# Patient Record
Sex: MALE | Race: WHITE | NOT HISPANIC OR LATINO | Employment: OTHER | URBAN - NONMETROPOLITAN AREA
[De-identification: names, ages, dates, MRNs, and addresses within clinical notes are randomized per-mention and may not be internally consistent; named-entity substitution may affect disease eponyms.]

---

## 2017-01-06 RX ORDER — ALBUTEROL SULFATE 90 UG/1
2 AEROSOL, METERED RESPIRATORY (INHALATION) EVERY 4 HOURS PRN
Qty: 1 INHALER | Refills: 11 | Status: SHIPPED | OUTPATIENT
Start: 2017-01-06 | End: 2017-02-07 | Stop reason: SDUPTHER

## 2017-01-20 ENCOUNTER — OFFICE VISIT (OUTPATIENT)
Dept: FAMILY MEDICINE CLINIC | Facility: CLINIC | Age: 62
End: 2017-01-20

## 2017-01-20 VITALS
SYSTOLIC BLOOD PRESSURE: 122 MMHG | WEIGHT: 132 LBS | BODY MASS INDEX: 19.55 KG/M2 | HEART RATE: 66 BPM | DIASTOLIC BLOOD PRESSURE: 78 MMHG | HEIGHT: 69 IN | OXYGEN SATURATION: 91 % | TEMPERATURE: 97 F

## 2017-01-20 DIAGNOSIS — J44.1 CHRONIC OBSTRUCTIVE PULMONARY DISEASE WITH ACUTE EXACERBATION (HCC): Primary | ICD-10-CM

## 2017-01-20 DIAGNOSIS — L23.1 ALLERGIC CONTACT DERMATITIS DUE TO ADHESIVES: ICD-10-CM

## 2017-01-20 DIAGNOSIS — J44.9 CHRONIC OBSTRUCTIVE PULMONARY DISEASE, UNSPECIFIED COPD TYPE (HCC): ICD-10-CM

## 2017-01-20 DIAGNOSIS — F17.200 TOBACCO DEPENDENCE SYNDROME: ICD-10-CM

## 2017-01-20 PROCEDURE — 99214 OFFICE O/P EST MOD 30 MIN: CPT | Performed by: FAMILY MEDICINE

## 2017-01-20 PROCEDURE — 96372 THER/PROPH/DIAG INJ SC/IM: CPT | Performed by: FAMILY MEDICINE

## 2017-01-20 RX ORDER — GUAIFENESIN AND DEXTROMETHORPHAN HYDROBROMIDE 600; 30 MG/1; MG/1
1 TABLET, EXTENDED RELEASE ORAL 2 TIMES DAILY PRN
Qty: 14 TABLET | Refills: 0 | Status: SHIPPED | OUTPATIENT
Start: 2017-01-20 | End: 2017-06-02 | Stop reason: SDUPTHER

## 2017-01-20 RX ORDER — LEVOFLOXACIN 500 MG/1
500 TABLET, FILM COATED ORAL DAILY
Qty: 10 TABLET | Refills: 0 | Status: SHIPPED | OUTPATIENT
Start: 2017-01-20 | End: 2017-01-30

## 2017-01-20 RX ORDER — POLYETHYLENE GLYCOL 3350 17 G
4 POWDER IN PACKET (EA) ORAL AS NEEDED
Qty: 135 EACH | Refills: 0 | Status: SHIPPED | OUTPATIENT
Start: 2017-01-20 | End: 2017-08-18

## 2017-01-20 RX ORDER — METHYLPREDNISOLONE SODIUM SUCCINATE 125 MG/2ML
125 INJECTION, POWDER, LYOPHILIZED, FOR SOLUTION INTRAMUSCULAR; INTRAVENOUS ONCE
Status: COMPLETED | OUTPATIENT
Start: 2017-01-20 | End: 2017-01-20

## 2017-01-20 RX ADMIN — METHYLPREDNISOLONE SODIUM SUCCINATE 125 MG: 125 INJECTION, POWDER, LYOPHILIZED, FOR SOLUTION INTRAMUSCULAR; INTRAVENOUS at 09:36

## 2017-01-20 NOTE — PROGRESS NOTES
Subjective   Chief Complaint   Patient presents with   • lab results   • Med Dose Change     symbicort, insurance wont pay for it   • Nasal Congestion     hard to breathe     Kermit Corley is a 61 y.o. male.   lab results; Med Dose Change (symbicort, insurance wont pay for it); and Nasal Congestion (hard to breathe)    Shortness of Breath   This is a new problem. The current episode started in the past 7 days. The problem occurs daily. The problem has been gradually worsening. Associated symptoms include chest pain, ear pain, headaches, a rash, rhinorrhea, sputum production and wheezing. Pertinent negatives include no abdominal pain, fever, neck pain or sore throat. The symptoms are aggravated by any activity and lying flat. He has tried nothing for the symptoms. The treatment provided no relief. His past medical history is significant for COPD.   insurance wont cover symbicort or advair  Oxygen at home normally set at 2LPM but recently increased to 3LPM due to SOA    The following portions of the patient's history were reviewed and updated as appropriate: allergies, current medications, past family history, past medical history, past social history, past surgical history and problem list.    Review of Systems   Constitutional: Negative for appetite change, chills, fatigue and fever.   HENT: Positive for ear pain and rhinorrhea. Negative for congestion and sore throat.    Eyes: Negative for pain.   Respiratory: Positive for sputum production, shortness of breath and wheezing. Negative for cough.    Cardiovascular: Positive for chest pain. Negative for palpitations.   Gastrointestinal: Negative for abdominal pain, constipation, diarrhea and nausea.   Genitourinary: Negative for dysuria.   Musculoskeletal: Negative for back pain, joint swelling and neck pain.   Skin: Positive for rash.   Neurological: Positive for headaches. Negative for dizziness.       Objective   Visit Vitals   • /78   • Pulse 66  "  • Temp 97 °F (36.1 °C)   • Ht 69\" (175.3 cm)   • Wt 132 lb (59.9 kg)   • SpO2 91%   • BMI 19.49 kg/m2     Physical Exam   Constitutional: He is oriented to person, place, and time. He appears well-developed and well-nourished.   HENT:   Head: Normocephalic.   Right Ear: External ear and ear canal normal. A middle ear effusion is present.   Left Ear: Tympanic membrane, external ear and ear canal normal.   Nose: Right sinus exhibits maxillary sinus tenderness. Right sinus exhibits no frontal sinus tenderness. Left sinus exhibits maxillary sinus tenderness. Left sinus exhibits no frontal sinus tenderness.   Mouth/Throat: Posterior oropharyngeal erythema present.   Posterior rhinorrhea   Eyes: Pupils are equal, round, and reactive to light.   Neck: Normal range of motion. Neck supple.   Cardiovascular: Normal rate and regular rhythm.    Pulmonary/Chest: He has decreased breath sounds. He has wheezes. He exhibits no tenderness.   Neurological: He is alert and oriented to person, place, and time.   Skin: Skin is dry. Rash noted.   Blistered rash on the left upper extremity with erythema from nicoderm patch   Nursing note and vitals reviewed.      Assessment/Plan   Problems Addressed this Visit        Respiratory    Chronic obstructive pulmonary disease - Primary    Relevant Medications    methylPREDNISolone sodium succinate (SOLU-Medrol) injection 125 mg (Start on 1/20/2017 10:00 AM)    levoFLOXacin (LEVAQUIN) 500 MG tablet    guaifenesin-dextromethorphan (MUCINEX DM)  MG tablet sustained-release 12 hour tablet    tiotropium (SPIRIVA HANDIHALER) 18 MCG per inhalation capsule       Musculoskeletal and Integument    Allergic contact dermatitis due to adhesives       Other    Tobacco dependence syndrome    Relevant Medications    nicotine polacrilex (NICORETTE MINI) 4 MG lozenge        Solumedrol IM today  Start mucinex DM  Start levaquin x 10 days  Use oxygen as directed    Start nicoderemerita frye  Stop nicoderm " patches due to reaction    Start spiriva after exacerbation resolved for maintenance    Reviewed lab work with patient    Recheck in 3 months

## 2017-01-20 NOTE — MR AVS SNAPSHOT
Kermit Corley   1/20/2017 9:15 AM   Office Visit    Dept Phone:  [phone]   Encounter #:  [id number]    Provider:  Maribel Barclay MD   Department:  [facility]                Your Full Care Plan              Today's Medication Changes          These changes are accurate as of: 1/20/17  9:36 AM.  If you have any questions, ask your nurse or doctor.               New Medication(s)Ordered:     guaifenesin-dextromethorphan  MG tablet sustained-release 12 hour tablet   Take 1 tablet by mouth 2 (Two) Times a Day As Needed (cough and congestion).   Started by:  Maribel Barclay MD       levoFLOXacin 500 MG tablet   Commonly known as:  LEVAQUIN   Take 1 tablet by mouth Daily for 10 days.   Started by:  Maribel Barclay MD       nicotine polacrilex 4 MG lozenge   Commonly known as:  NICORETTE MINI   Dissolve 1 lozenge in the mouth As Needed for smoking cessation.   Started by:  Maribel Barclay MD       tiotropium 18 MCG per inhalation capsule   Commonly known as:  SPIRIVA HANDIHALER   Place 1 capsule into inhaler and inhale Daily.   Started by:  Maribel Barclay MD         Stop taking medication(s)listed here:     budesonide-formoterol 160-4.5 MCG/ACT inhaler   Commonly known as:  SYMBICORT   Stopped by:  Maribel Barclay MD           finasteride 5 MG tablet   Commonly known as:  PROSCAR   Stopped by:  Maribel Barclay MD           nicotine 14 MG/24HR patch   Commonly known as:  NICODERM CQ   Stopped by:  Maribel Barclay MD           predniSONE 20 MG tablet   Commonly known as:  DELTASONE   Stopped by:  Maribel Barclay MD                Where to Get Your Medications      These medications were sent to [facility] [address] [place] [address] [address] [facility] [place] [facility] [phone] [facility] [phone]   [address] [facility] [address]     Phone:  [phone]    guaifenesin-dextromethorphan  MG tablet sustained-release 12 hour tablet    levoFLOXacin 500 MG tablet    nicotine polacrilex 4 MG lozenge    tiotropium 18 MCG per inhalation capsule                  Your Updated Medication List          This list is accurate as of: 1/20/17  9:36 AM.  Always use your most recent med list.                * albuterol (2.5 MG/3ML) 0.083% nebulizer solution   Commonly known as:  PROVENTIL       * albuterol 108 (90 BASE) MCG/ACT inhaler   Commonly known as:  PROVENTIL HFA;VENTOLIN HFA   Inhale 2 puffs Every 4 (Four) Hours As Needed for wheezing.       aspirin 81 MG EC tablet       guaifenesin-dextromethorphan  MG tablet sustained-release 12 hour tablet   Take 1 tablet by mouth 2 (Two) Times a Day As Needed (cough and congestion).       IPRATROPIUM-ALBUTEROL IN       levoFLOXacin 500 MG tablet   Commonly known as:  LEVAQUIN   Take 1 tablet by mouth Daily for 10 days.       magnesium oxide 400 (241.3 MG) MG tablet tablet   Commonly known as:  MAGOX       montelukast 10 MG tablet   Commonly known as:  SINGULAIR   Take 1 tablet by mouth Every Night.       Nebulizer device       nicotine polacrilex 4 MG lozenge   Commonly known as:  NICORETTE MINI   Dissolve 1 lozenge in the mouth As Needed for smoking cessation.       tiotropium 18 MCG per inhalation capsule   Commonly known as:  SPIRIVA HANDIHALER   Place 1 capsule into inhaler and inhale Daily.       * Notice:  This list has 2 medication(s) that are the same as other medications prescribed for you. Read the directions carefully, and ask your doctor or other care provider to review them with you.            You Were Diagnosed With        Codes Comments    Chronic obstructive pulmonary disease with acute exacerbation    -  Primary ICD-10-CM: J44.1  ICD-9-CM: 491.21     Tobacco dependence syndrome     ICD-10-CM: F17.200  ICD-9-CM: 305.1     Chronic obstructive pulmonary disease, unspecified COPD type     ICD-10-CM:  "J44.9  ICD-9-CM: 496     Allergic contact dermatitis due to adhesives     ICD-10-CM: L23.1  ICD-9-CM: 692.4       Medications to be Given to You by a Medical Professional     Due       Frequency    1/20/2017 methylPREDNISolone sodium succinate (SOLU-Medrol) injection 125 mg  Once      Instructions     None    Patient Instructions History      Upcoming Appointments     Visit Type Date Time Department    FOLLOW UP 1/20/2017  9:15 AM [facility]      [employer] Signup     Our records indicate that you have declined [employer] signup. If you would like to sign up for [employer], please email [email or web address] or call [phone] to obtain an activation code.             Other Info from Your Visit           Allergies     No Known Allergies      Reason for Visit     lab results     Med Dose Change symbicort, insurance wont pay for it    Nasal Congestion hard to breathe      Vital Signs     Blood Pressure Pulse Temperature Height Weight Oxygen Saturation    122/78 66 97 °F (36.1 °C) 69\" (175.3 cm) 132 lb (59.9 kg) 91%    Body Mass Index Smoking Status                19.49 kg/m2 Current Every Day Smoker          Problems and Diagnoses Noted     Allergic skin inflammation due to adhesives    Chronic airway obstruction    Tobacco dependence      No Longer an Issue     Needs flu shot        "

## 2017-02-07 RX ORDER — ALBUTEROL SULFATE 90 UG/1
2 AEROSOL, METERED RESPIRATORY (INHALATION) EVERY 4 HOURS PRN
Qty: 1 INHALER | Refills: 11 | Status: SHIPPED | OUTPATIENT
Start: 2017-02-07 | End: 2017-06-02 | Stop reason: SDUPTHER

## 2017-02-13 RX ORDER — ALBUTEROL SULFATE 2.5 MG/3ML
2.5 SOLUTION RESPIRATORY (INHALATION) EVERY 4 HOURS PRN
Qty: 1 VIAL | Refills: 5 | Status: SHIPPED | OUTPATIENT
Start: 2017-02-13 | End: 2017-09-21 | Stop reason: SDUPTHER

## 2017-06-02 ENCOUNTER — OFFICE VISIT (OUTPATIENT)
Dept: FAMILY MEDICINE CLINIC | Facility: CLINIC | Age: 62
End: 2017-06-02

## 2017-06-02 VITALS
HEART RATE: 105 BPM | HEIGHT: 69 IN | OXYGEN SATURATION: 96 % | TEMPERATURE: 98.1 F | SYSTOLIC BLOOD PRESSURE: 115 MMHG | BODY MASS INDEX: 19.85 KG/M2 | WEIGHT: 134 LBS | DIASTOLIC BLOOD PRESSURE: 64 MMHG

## 2017-06-02 DIAGNOSIS — F17.200 TOBACCO DEPENDENCE SYNDROME: ICD-10-CM

## 2017-06-02 DIAGNOSIS — R05.8 PRODUCTIVE COUGH: ICD-10-CM

## 2017-06-02 DIAGNOSIS — J44.1 CHRONIC OBSTRUCTIVE PULMONARY DISEASE WITH ACUTE EXACERBATION (HCC): Primary | ICD-10-CM

## 2017-06-02 DIAGNOSIS — J44.1 COPD EXACERBATION (HCC): Primary | ICD-10-CM

## 2017-06-02 PROCEDURE — 99212 OFFICE O/P EST SF 10 MIN: CPT | Performed by: FAMILY MEDICINE

## 2017-06-02 PROCEDURE — 96372 THER/PROPH/DIAG INJ SC/IM: CPT | Performed by: FAMILY MEDICINE

## 2017-06-02 RX ORDER — DOXYCYCLINE HYCLATE 100 MG
100 TABLET ORAL 2 TIMES DAILY
Qty: 20 TABLET | Refills: 0 | Status: SHIPPED | OUTPATIENT
Start: 2017-06-02 | End: 2017-06-12

## 2017-06-02 RX ORDER — GUAIFENESIN AND DEXTROMETHORPHAN HYDROBROMIDE 600; 30 MG/1; MG/1
1 TABLET, EXTENDED RELEASE ORAL 2 TIMES DAILY PRN
Qty: 14 TABLET | Refills: 0 | Status: SHIPPED | OUTPATIENT
Start: 2017-06-02 | End: 2017-08-30

## 2017-06-02 RX ORDER — TRIAMCINOLONE ACETONIDE 40 MG/ML
80 INJECTION, SUSPENSION INTRA-ARTICULAR; INTRAMUSCULAR ONCE
Status: COMPLETED | OUTPATIENT
Start: 2017-06-02 | End: 2017-06-02

## 2017-06-02 RX ORDER — IPRATROPIUM BROMIDE AND ALBUTEROL SULFATE 2.5; .5 MG/3ML; MG/3ML
3 SOLUTION RESPIRATORY (INHALATION)
Status: DISCONTINUED | OUTPATIENT
Start: 2017-06-02 | End: 2017-10-16

## 2017-06-02 RX ORDER — ALBUTEROL SULFATE 2.5 MG/3ML
2.5 SOLUTION RESPIRATORY (INHALATION) EVERY 4 HOURS PRN
Qty: 1 VIAL | Refills: 5 | Status: CANCELLED | OUTPATIENT
Start: 2017-06-02

## 2017-06-02 RX ORDER — ALBUTEROL SULFATE 90 UG/1
2 AEROSOL, METERED RESPIRATORY (INHALATION) EVERY 6 HOURS PRN
Qty: 1 INHALER | Refills: 11 | Status: SHIPPED | OUTPATIENT
Start: 2017-06-02 | End: 2018-08-31 | Stop reason: SDUPTHER

## 2017-06-02 RX ORDER — TRIAMCINOLONE ACETONIDE 40 MG/ML
80 INJECTION, SUSPENSION INTRA-ARTICULAR; INTRAMUSCULAR ONCE
Status: DISCONTINUED | OUTPATIENT
Start: 2017-06-02 | End: 2017-08-25

## 2017-06-02 RX ADMIN — TRIAMCINOLONE ACETONIDE 80 MG: 40 INJECTION, SUSPENSION INTRA-ARTICULAR; INTRAMUSCULAR at 14:28

## 2017-06-02 NOTE — PROGRESS NOTES
Subjective   Chief Complaint   Patient presents with   • COPD     5 month follow up     Kermit Corley is a 62 y.o. male.   COPD (5 month follow up)    Shortness of Breath   This is a new problem. The current episode started in the past 7 days. The problem occurs constantly. Associated symptoms include a sore throat, sputum production, vomiting and wheezing. Pertinent negatives include no abdominal pain, chest pain, ear pain, fever, headaches, hemoptysis, neck pain, rash or rhinorrhea. The symptoms are aggravated by lying flat. Risk factors include smoking. He has tried beta agonist inhalers for the symptoms. The treatment provided no relief.     CXR -  Lungs/Pleura: The lungs appear hyperinflated and there are coarse  interstitial markings likely due to COPD. There are tiny calcific  densities scattered throughout both lungs which are likely due to  old granulomatous disease.     Cardiomediastinal structures: Calcified right hilar lymph nodes  appear unchanged, likely due to old granulomatous disease.  Cardiac silhouette is normal in size.      IMPRESSION:  CONCLUSION:   No acute cardiopulmonary disease     The following portions of the patient's history were reviewed and updated as appropriate: allergies, current medications, past family history, past medical history, past social history, past surgical history and problem list.    Review of Systems   Constitutional: Negative for appetite change, chills, fatigue and fever.   HENT: Positive for sore throat. Negative for congestion, ear pain and rhinorrhea.    Eyes: Negative for pain.   Respiratory: Positive for sputum production, shortness of breath and wheezing. Negative for cough and hemoptysis.    Cardiovascular: Negative for chest pain and palpitations.   Gastrointestinal: Positive for vomiting. Negative for abdominal pain, constipation, diarrhea and nausea.   Genitourinary: Negative for dysuria.   Musculoskeletal: Negative for back pain, joint swelling  "and neck pain.   Skin: Negative for rash.   Neurological: Negative for dizziness and headaches.       Objective   /64  Pulse 105  Temp 98.1 °F (36.7 °C)  Ht 69\" (175.3 cm)  Wt 134 lb (60.8 kg)  SpO2 96%  BMI 19.79 kg/m2  Physical Exam   Constitutional: He is oriented to person, place, and time. He appears well-developed and well-nourished.   HENT:   Head: Normocephalic and atraumatic.   Eyes: Pupils are equal, round, and reactive to light.   Neck: Normal range of motion. Neck supple.   Cardiovascular: Regular rhythm and normal heart sounds.  Tachycardia present.    Pulmonary/Chest: No respiratory distress. He has wheezes. He has no rales.   Abdominal: Soft. Bowel sounds are normal.   Neurological: He is alert and oriented to person, place, and time.   Skin: Skin is warm and dry.   Psychiatric: He has a normal mood and affect.   Nursing note and vitals reviewed.      Assessment/Plan   Problems Addressed this Visit        Respiratory    Chronic obstructive pulmonary disease - Primary    Relevant Medications    albuterol (PROVENTIL HFA;VENTOLIN HFA) 108 (90 BASE) MCG/ACT inhaler    triamcinolone acetonide (KENALOG-40) injection 80 mg (Start on 6/2/2017  3:15 PM)    ipratropium-albuterol (DUO-NEB) nebulizer solution 3 mL (Start on 6/2/2017  4:30 PM)    guaifenesin-dextromethorphan (MUCINEX DM)  MG tablet sustained-release 12 hour tablet       Other    Tobacco dependence syndrome      Other Visit Diagnoses     Productive cough        Relevant Orders    XR Chest 2 View (Completed)        cxr ordered - negative  duoneb provided in the office  Kenalog 80mg IM    Start doxycycline    Start mucinex DM    Refilled nebulizer and albuterol inhaler    Recheck in 1 week         "

## 2017-08-18 ENCOUNTER — OFFICE VISIT (OUTPATIENT)
Dept: FAMILY MEDICINE CLINIC | Facility: CLINIC | Age: 62
End: 2017-08-18

## 2017-08-18 VITALS
BODY MASS INDEX: 20.44 KG/M2 | DIASTOLIC BLOOD PRESSURE: 78 MMHG | HEART RATE: 97 BPM | HEIGHT: 69 IN | WEIGHT: 138 LBS | OXYGEN SATURATION: 95 % | SYSTOLIC BLOOD PRESSURE: 130 MMHG | TEMPERATURE: 98 F

## 2017-08-18 DIAGNOSIS — J18.9 COMMUNITY ACQUIRED PNEUMONIA: ICD-10-CM

## 2017-08-18 DIAGNOSIS — J44.1 COPD WITH ACUTE EXACERBATION (HCC): Primary | ICD-10-CM

## 2017-08-18 DIAGNOSIS — F17.200 TOBACCO DEPENDENCE SYNDROME: ICD-10-CM

## 2017-08-18 DIAGNOSIS — G47.01 INSOMNIA DUE TO MEDICAL CONDITION: ICD-10-CM

## 2017-08-18 PROCEDURE — 99214 OFFICE O/P EST MOD 30 MIN: CPT | Performed by: FAMILY MEDICINE

## 2017-08-18 RX ORDER — LEVOFLOXACIN 500 MG/1
500 TABLET, FILM COATED ORAL DAILY
COMMUNITY
End: 2017-08-25

## 2017-08-18 RX ORDER — NICOTINE 21 MG/24HR
1 PATCH, TRANSDERMAL 24 HOURS TRANSDERMAL EVERY 24 HOURS
Qty: 30 PATCH | Refills: 0 | Status: SHIPPED | OUTPATIENT
Start: 2017-08-18 | End: 2017-08-30

## 2017-08-18 RX ORDER — PREDNISONE 20 MG/1
20 TABLET ORAL DAILY
COMMUNITY
End: 2017-08-30

## 2017-08-18 RX ORDER — TRAZODONE HYDROCHLORIDE 50 MG/1
50 TABLET ORAL NIGHTLY
Qty: 30 TABLET | Refills: 5 | Status: SHIPPED | OUTPATIENT
Start: 2017-08-18 | End: 2017-08-30

## 2017-08-18 NOTE — PROGRESS NOTES
"Subjective   Chief Complaint   Patient presents with   • hospital follow up   • COPD     Kermit Corley is a 62 y.o. male.   hospital follow up and COPD    History of Present Illness     Hospital follow up from [facility] admitted August 12 - 16th  Diagnosed with pneumonia  Sent home with abx and continue with home oxygen  Portable oxygen set at 2LPM by [place]  Currently using albuterol, spiriva  Discharged home with mucinex, prednisone taper, singulair, levaquin  Complaining of shortness of breath  Previously followed with Dr Lee, pulmonology  Requesting a referral to pulmonology  Has cut back on smoking to 1 pack every 3 days  Not using nicorette for smoking cessation    C/o trouble falling into sleep  Recent death of his wife in June has caused him to be emotional, irritable and have a depressed mood    The following portions of the patient's history were reviewed and updated as appropriate: allergies, current medications, past family history, past medical history, past social history, past surgical history and problem list.    Review of Systems   Constitutional: Negative for appetite change, chills, fatigue and fever.   HENT: Negative for congestion, ear pain, rhinorrhea and sore throat.    Eyes: Negative for pain.   Respiratory: Positive for cough, chest tightness, shortness of breath and wheezing.    Cardiovascular: Negative for chest pain and palpitations.   Gastrointestinal: Negative for abdominal pain, constipation, diarrhea and nausea.   Genitourinary: Negative for dysuria.   Musculoskeletal: Negative for back pain, joint swelling and neck pain.   Skin: Negative for rash.   Neurological: Negative for dizziness and headaches.   Psychiatric/Behavioral: Positive for dysphoric mood and sleep disturbance.       Objective   /78  Pulse 97  Temp 98 °F (36.7 °C)  Ht 69\" (175.3 cm)  Wt 138 lb (62.6 kg)  SpO2 95%  BMI 20.38 kg/m2  Physical Exam   Constitutional: He is oriented to person, " place, and time. He appears well-developed and well-nourished.   HENT:   Head: Normocephalic and atraumatic.   Eyes: Pupils are equal, round, and reactive to light.   Neck: Normal range of motion. Neck supple.   Cardiovascular: Normal rate, regular rhythm and normal heart sounds.    Pulmonary/Chest: He is in respiratory distress. He has decreased breath sounds. He has no wheezes. He has no rales.   Abdominal: Soft. Bowel sounds are normal.   Musculoskeletal: Normal range of motion.   Neurological: He is alert and oriented to person, place, and time.   Skin: Skin is warm and dry.   Psychiatric: He has a normal mood and affect.   Nursing note and vitals reviewed.    Assessment/Plan   Problems Addressed this Visit        Respiratory    COPD with acute exacerbation - Primary    Relevant Medications    predniSONE (DELTASONE) 20 MG tablet    Other Relevant Orders    Ambulatory Referral to Pulmonology       Other    Tobacco dependence syndrome    Relevant Medications    nicotine (NICODERM CQ) 14 MG/24HR patch    Insomnia due to medical condition      Other Visit Diagnoses     Community acquired pneumonia            Would prefer close pulmonologist  But would be ok with [place], previously seen Dr Lee    Return for repeat cxr after medication completed    Start trazodone    Discussed smoking cessation  Start nicoderm patches    Recheck in 1-2 weeks

## 2017-08-25 ENCOUNTER — TELEPHONE (OUTPATIENT)
Dept: FAMILY MEDICINE CLINIC | Facility: CLINIC | Age: 62
End: 2017-08-25

## 2017-08-25 ENCOUNTER — OFFICE VISIT (OUTPATIENT)
Dept: FAMILY MEDICINE CLINIC | Facility: CLINIC | Age: 62
End: 2017-08-25

## 2017-08-25 VITALS
HEART RATE: 82 BPM | TEMPERATURE: 96.6 F | OXYGEN SATURATION: 95 % | HEIGHT: 69 IN | DIASTOLIC BLOOD PRESSURE: 72 MMHG | SYSTOLIC BLOOD PRESSURE: 122 MMHG | BODY MASS INDEX: 19.4 KG/M2 | WEIGHT: 131 LBS

## 2017-08-25 DIAGNOSIS — F17.200 TOBACCO DEPENDENCE SYNDROME: ICD-10-CM

## 2017-08-25 DIAGNOSIS — J44.1 COPD WITH ACUTE EXACERBATION (HCC): Primary | ICD-10-CM

## 2017-08-25 PROBLEM — L23.1 ALLERGIC CONTACT DERMATITIS DUE TO ADHESIVES: Status: RESOLVED | Noted: 2017-01-20 | Resolved: 2017-08-25

## 2017-08-25 PROCEDURE — 99213 OFFICE O/P EST LOW 20 MIN: CPT | Performed by: FAMILY MEDICINE

## 2017-08-25 NOTE — PROGRESS NOTES
"Subjective   Chief Complaint   Patient presents with   • COPD     1 week follow up     Kermit Corley is a 62 y.o. male.   COPD (1 week follow up)    History of Present Illness     Presents today for a repeat CXR after diagnosis of pneumonia  Has since been treated with prednisone and levaquin  Has completed abx but has prednisone left  He also has a follow up appointment scheduled with Dr Lee 8/30  He has cut back on his smoking - down to a couple cigarettes per day  Using his inhalers as directed  Complaints of cough, SOA    The following portions of the patient's history were reviewed and updated as appropriate: allergies, current medications, past family history, past medical history, past social history, past surgical history and problem list.    Review of Systems   Constitutional: Negative for appetite change, chills, fatigue and fever.   HENT: Negative for congestion, ear pain, rhinorrhea and sore throat.    Eyes: Negative for pain.   Respiratory: Positive for cough and shortness of breath.    Cardiovascular: Negative for chest pain and palpitations.   Gastrointestinal: Negative for abdominal pain, constipation, diarrhea and nausea.   Genitourinary: Negative for dysuria.   Musculoskeletal: Negative for back pain, joint swelling and neck pain.   Skin: Negative for rash.   Neurological: Negative for dizziness and headaches.       Objective   /72  Pulse 82  Temp 96.6 °F (35.9 °C)  Ht 69\" (175.3 cm)  Wt 131 lb (59.4 kg)  SpO2 95%  BMI 19.35 kg/m2  Physical Exam   Constitutional: He is oriented to person, place, and time. He appears well-developed and well-nourished.   HENT:   Head: Normocephalic and atraumatic.   Eyes: Pupils are equal, round, and reactive to light.   Neck: Normal range of motion. Neck supple.   Cardiovascular: Normal rate, regular rhythm and normal heart sounds.    Pulmonary/Chest: No respiratory distress. He has decreased breath sounds. He has wheezes. He has no rales. "   Abdominal: Soft. Bowel sounds are normal.   Musculoskeletal: Normal range of motion.   Neurological: He is alert and oriented to person, place, and time.   Skin: Skin is warm and dry.   Psychiatric: He has a normal mood and affect.   Nursing note and vitals reviewed.      Assessment/Plan   Problems Addressed this Visit        Respiratory    COPD with acute exacerbation - Primary    Relevant Orders    XR Chest 2 View       Other    Tobacco dependence syndrome        Finish prednisone    Discussed smoking cessation    cxr ordered    Follow up with Dr Lee as scheduled    Return as needed

## 2017-08-25 NOTE — TELEPHONE ENCOUNTER
----- Message from Maribel Barclay MD sent at 8/25/2017  9:01 AM CDT -----  cxr - copd, no pneumonia.

## 2017-08-30 ENCOUNTER — OFFICE VISIT (OUTPATIENT)
Dept: PULMONOLOGY | Facility: CLINIC | Age: 62
End: 2017-08-30

## 2017-08-30 VITALS
SYSTOLIC BLOOD PRESSURE: 120 MMHG | OXYGEN SATURATION: 97 % | DIASTOLIC BLOOD PRESSURE: 74 MMHG | WEIGHT: 131 LBS | HEART RATE: 93 BPM | BODY MASS INDEX: 19.4 KG/M2 | HEIGHT: 69 IN

## 2017-08-30 DIAGNOSIS — J44.1 COPD WITH ACUTE EXACERBATION (HCC): Primary | ICD-10-CM

## 2017-08-30 DIAGNOSIS — J30.2 SEASONAL ALLERGIC RHINITIS, UNSPECIFIED ALLERGIC RHINITIS TRIGGER: ICD-10-CM

## 2017-08-30 DIAGNOSIS — F17.200 TOBACCO DEPENDENCE: ICD-10-CM

## 2017-08-30 PROCEDURE — 96372 THER/PROPH/DIAG INJ SC/IM: CPT | Performed by: INTERNAL MEDICINE

## 2017-08-30 PROCEDURE — 99214 OFFICE O/P EST MOD 30 MIN: CPT | Performed by: INTERNAL MEDICINE

## 2017-08-30 RX ORDER — DOXYCYCLINE 100 MG/1
CAPSULE ORAL
Qty: 20 CAPSULE | Refills: 0 | Status: SHIPPED | OUTPATIENT
Start: 2017-08-30 | End: 2017-10-16

## 2017-08-30 RX ORDER — PREDNISONE 10 MG/1
TABLET ORAL
Qty: 21 TABLET | Refills: 0 | Status: SHIPPED | OUTPATIENT
Start: 2017-08-30 | End: 2017-10-16

## 2017-08-30 RX ORDER — BETAMETHASONE SODIUM PHOSPHATE AND BETAMETHASONE ACETATE 3; 3 MG/ML; MG/ML
6 INJECTION, SUSPENSION INTRA-ARTICULAR; INTRALESIONAL; INTRAMUSCULAR; SOFT TISSUE ONCE
Status: COMPLETED | OUTPATIENT
Start: 2017-08-30 | End: 2017-08-30

## 2017-08-30 RX ADMIN — BETAMETHASONE SODIUM PHOSPHATE AND BETAMETHASONE ACETATE 6 MG: 3; 3 INJECTION, SUSPENSION INTRA-ARTICULAR; INTRALESIONAL; INTRAMUSCULAR; SOFT TISSUE at 09:11

## 2017-08-30 NOTE — PROGRESS NOTES
"This gentleman has long-standing advanced COPD, persistent tobacco use, frequent episodes of pneumonia, recent hospitalization for pneumonia.  He complains of increasing shortness of breath, wheeze, purulent sputum, dyspnea on exertion.  He also complains of sneezing and head congestion    ROS    Constitutional-no night sweats weight loss headaches  GI no abdominal pain nausea or diarrhea  Neuro no seizure or neurologic deficits  Musculoskeletal no deformity or joint pain   no dysuria or hematuria  Skin no rash or other lesions  All other systems reviewed and were negative except for the above.      Physical Exam  /74  Pulse 93  Ht 69\" (175.3 cm)  Wt 131 lb (59.4 kg)  SpO2 97%  BMI 19.35 kg/m2  Vital signs as above  Pupils equally round and reactive to light and accommodation, neck no JVD or adenopathy.  Cardiovascular regular rhythm and rate no murmur or gallop.  Abdomen soft no organomegaly tenderness.  Extremities no clubbing cyanosis or edema.  No cervical adenopathy.  No skin rash.  Neurologic good strength bilaterally without deficits  Dyspneic white male with respiratory rate in 20s with wheezes and rhonchi and nasal congestion    Impression COPD exacerbation, acute bronchitis, rhinitis exacerbation.    Plan Celestone 1 cc IM, prednisone tapering dose, doxycycline, a trial of breo, continue routine meds, return next week          This document has been electronically signed by Aba Lee MD on August 30, 2017 9:10 AM      "

## 2017-09-06 ENCOUNTER — OFFICE VISIT (OUTPATIENT)
Dept: PULMONOLOGY | Facility: CLINIC | Age: 62
End: 2017-09-06

## 2017-09-06 VITALS
HEIGHT: 69 IN | BODY MASS INDEX: 19.4 KG/M2 | DIASTOLIC BLOOD PRESSURE: 69 MMHG | HEART RATE: 76 BPM | WEIGHT: 131 LBS | SYSTOLIC BLOOD PRESSURE: 120 MMHG | OXYGEN SATURATION: 95 %

## 2017-09-06 DIAGNOSIS — J44.1 COPD WITH ACUTE EXACERBATION (HCC): ICD-10-CM

## 2017-09-06 DIAGNOSIS — F17.200 TOBACCO DEPENDENCE: Primary | ICD-10-CM

## 2017-09-06 PROCEDURE — 96372 THER/PROPH/DIAG INJ SC/IM: CPT | Performed by: INTERNAL MEDICINE

## 2017-09-06 PROCEDURE — 99214 OFFICE O/P EST MOD 30 MIN: CPT | Performed by: INTERNAL MEDICINE

## 2017-09-06 RX ORDER — DOXYCYCLINE 100 MG/1
CAPSULE ORAL
Qty: 20 CAPSULE | Refills: 0 | Status: SHIPPED | OUTPATIENT
Start: 2017-09-06 | End: 2017-10-16

## 2017-09-06 RX ORDER — METHYLPREDNISOLONE ACETATE 40 MG/ML
80 INJECTION, SUSPENSION INTRA-ARTICULAR; INTRALESIONAL; INTRAMUSCULAR; SOFT TISSUE ONCE
Status: COMPLETED | OUTPATIENT
Start: 2017-09-06 | End: 2017-09-06

## 2017-09-06 RX ORDER — PREDNISONE 10 MG/1
TABLET ORAL
Qty: 21 TABLET | Refills: 0 | Status: SHIPPED | OUTPATIENT
Start: 2017-09-06 | End: 2017-10-16

## 2017-09-06 RX ADMIN — METHYLPREDNISOLONE ACETATE 80 MG: 40 INJECTION, SUSPENSION INTRA-ARTICULAR; INTRALESIONAL; INTRAMUSCULAR; SOFT TISSUE at 10:10

## 2017-09-06 NOTE — PROGRESS NOTES
"This gentleman has COPD and persistent tobacco use.  He believes his bronchodilators are helping but he continues to have cough wheeze purulent sputum and dyspnea on exertion    ROS    Constitutional-no night sweats weight loss headaches  GI no abdominal pain nausea or diarrhea  Neuro no seizure or neurologic deficits  Musculoskeletal no deformity or joint pain   no dysuria or hematuria  Skin no rash or other lesions  All other systems reviewed and were negative except for the above.      Physical Exam  /69  Pulse 76  Ht 69\" (175.3 cm)  Wt 131 lb (59.4 kg)  SpO2 95%  BMI 19.35 kg/m2  Vital signs as above  Pupils equally round and reactive to light and accommodation, neck no JVD or adenopathy.  Cardiovascular regular rhythm and rate no murmur or gallop.  Abdomen soft no organomegaly tenderness.  Extremities no clubbing cyanosis or edema.  No cervical adenopathy.  No skin rash.  Neurologic good strength bilaterally without deficits  Lungs reveal diminished breath sounds, patient is in mild to moderate respiratory distress with wheezes    Impression COPD exacerbation, bronchitis    Plan Depo-Medrol, prednisone, breo, albuterol, doxycycline, discourage smoking, return in 1 month          This document has been electronically signed by Aba Lee MD on September 6, 2017 10:01 AM      "

## 2017-09-22 RX ORDER — ALBUTEROL SULFATE 2.5 MG/3ML
SOLUTION RESPIRATORY (INHALATION)
Qty: 180 VIAL | Refills: 1 | Status: SHIPPED | OUTPATIENT
Start: 2017-09-22 | End: 2018-03-21 | Stop reason: SDUPTHER

## 2017-10-16 ENCOUNTER — TELEPHONE (OUTPATIENT)
Dept: FAMILY MEDICINE CLINIC | Facility: CLINIC | Age: 62
End: 2017-10-16

## 2017-10-16 ENCOUNTER — LAB (OUTPATIENT)
Dept: LAB | Facility: OTHER | Age: 62
End: 2017-10-16

## 2017-10-16 ENCOUNTER — OFFICE VISIT (OUTPATIENT)
Dept: FAMILY MEDICINE CLINIC | Facility: CLINIC | Age: 62
End: 2017-10-16

## 2017-10-16 VITALS
DIASTOLIC BLOOD PRESSURE: 70 MMHG | OXYGEN SATURATION: 93 % | WEIGHT: 128 LBS | HEIGHT: 69 IN | HEART RATE: 82 BPM | TEMPERATURE: 97.6 F | BODY MASS INDEX: 18.96 KG/M2 | SYSTOLIC BLOOD PRESSURE: 122 MMHG

## 2017-10-16 DIAGNOSIS — J44.1 COPD WITH ACUTE EXACERBATION (HCC): Primary | ICD-10-CM

## 2017-10-16 DIAGNOSIS — F17.200 TOBACCO DEPENDENCE: ICD-10-CM

## 2017-10-16 LAB
ANION GAP SERPL CALCULATED.3IONS-SCNC: 14 MMOL/L (ref 5–15)
BASOPHILS # BLD AUTO: 0.01 10*3/MM3 (ref 0–0.2)
BASOPHILS NFR BLD AUTO: 0.1 % (ref 0–2)
BUN BLD-MCNC: <6 MG/DL (ref 8–25)
BUN/CREAT SERPL: ABNORMAL (ref 7–25)
CALCIUM SPEC-SCNC: 9.6 MG/DL (ref 8.4–10.8)
CHLORIDE SERPL-SCNC: 98 MMOL/L (ref 100–112)
CO2 SERPL-SCNC: 29 MMOL/L (ref 20–32)
CREAT BLD-MCNC: 0.7 MG/DL (ref 0.4–1.3)
DEPRECATED RDW RBC AUTO: 40.7 FL (ref 35.1–43.9)
EOSINOPHIL # BLD AUTO: 0.16 10*3/MM3 (ref 0–0.7)
EOSINOPHIL NFR BLD AUTO: 2.2 % (ref 0–7)
ERYTHROCYTE [DISTWIDTH] IN BLOOD BY AUTOMATED COUNT: 12.4 % (ref 11.5–14.5)
GFR SERPL CREATININE-BSD FRML MDRD: 114 ML/MIN/1.73 (ref 49–113)
GLUCOSE BLD-MCNC: 85 MG/DL (ref 70–100)
HCT VFR BLD AUTO: 43.8 % (ref 39–49)
HGB BLD-MCNC: 14.4 G/DL (ref 13.7–17.3)
LYMPHOCYTES # BLD AUTO: 1.22 10*3/MM3 (ref 0.6–4.2)
LYMPHOCYTES NFR BLD AUTO: 16.5 % (ref 10–50)
MCH RBC QN AUTO: 30 PG (ref 26.5–34)
MCHC RBC AUTO-ENTMCNC: 32.9 G/DL (ref 31.5–36.3)
MCV RBC AUTO: 91.3 FL (ref 80–98)
MONOCYTES # BLD AUTO: 0.86 10*3/MM3 (ref 0–0.9)
MONOCYTES NFR BLD AUTO: 11.6 % (ref 0–12)
NEUTROPHILS # BLD AUTO: 5.16 10*3/MM3 (ref 2–8.6)
NEUTROPHILS NFR BLD AUTO: 69.6 % (ref 37–80)
PLATELET # BLD AUTO: 251 10*3/MM3 (ref 150–450)
PMV BLD AUTO: 10.5 FL (ref 8–12)
POTASSIUM BLD-SCNC: 3.9 MMOL/L (ref 3.4–5.4)
RBC # BLD AUTO: 4.8 10*6/MM3 (ref 4.37–5.74)
SODIUM BLD-SCNC: 141 MMOL/L (ref 134–146)
WBC NRBC COR # BLD: 7.41 10*3/MM3 (ref 3.2–9.8)

## 2017-10-16 PROCEDURE — 96372 THER/PROPH/DIAG INJ SC/IM: CPT | Performed by: FAMILY MEDICINE

## 2017-10-16 PROCEDURE — 85025 COMPLETE CBC W/AUTO DIFF WBC: CPT | Performed by: FAMILY MEDICINE

## 2017-10-16 PROCEDURE — 99213 OFFICE O/P EST LOW 20 MIN: CPT | Performed by: FAMILY MEDICINE

## 2017-10-16 PROCEDURE — 80048 BASIC METABOLIC PNL TOTAL CA: CPT | Performed by: FAMILY MEDICINE

## 2017-10-16 RX ORDER — BENZONATATE 100 MG/1
100 CAPSULE ORAL 3 TIMES DAILY PRN
Qty: 30 CAPSULE | Refills: 0 | Status: SHIPPED | OUTPATIENT
Start: 2017-10-16 | End: 2017-12-04 | Stop reason: SDUPTHER

## 2017-10-16 RX ORDER — TRIAMCINOLONE ACETONIDE 40 MG/ML
80 INJECTION, SUSPENSION INTRA-ARTICULAR; INTRAMUSCULAR ONCE
Status: COMPLETED | OUTPATIENT
Start: 2017-10-16 | End: 2017-10-16

## 2017-10-16 RX ORDER — IPRATROPIUM BROMIDE AND ALBUTEROL SULFATE 2.5; .5 MG/3ML; MG/3ML
3 SOLUTION RESPIRATORY (INHALATION) ONCE
Status: COMPLETED | OUTPATIENT
Start: 2017-10-16 | End: 2017-10-16

## 2017-10-16 RX ORDER — METHYLPREDNISOLONE 4 MG/1
TABLET ORAL
Qty: 21 EACH | Refills: 0 | Status: SHIPPED | OUTPATIENT
Start: 2017-10-16 | End: 2017-10-22

## 2017-10-16 RX ADMIN — TRIAMCINOLONE ACETONIDE 80 MG: 40 INJECTION, SUSPENSION INTRA-ARTICULAR; INTRAMUSCULAR at 10:37

## 2017-10-16 RX ADMIN — IPRATROPIUM BROMIDE AND ALBUTEROL SULFATE 3 ML: 2.5; .5 SOLUTION RESPIRATORY (INHALATION) at 10:48

## 2017-10-16 NOTE — PROGRESS NOTES
"Subjective   Chief Complaint   Patient presents with   • Nasal Congestion     fever, 1 week   • Cough     productive     Kermit Corley is a 62 y.o. male.   Nasal Congestion (fever, 1 week) and Cough (productive)    Cough   This is a new problem. The current episode started in the past 7 days. The problem has been gradually worsening. The cough is productive of sputum. Associated symptoms include chest pain, chills, ear congestion, ear pain, headaches, nasal congestion, postnasal drip, rhinorrhea, a sore throat and shortness of breath. The symptoms are aggravated by lying down. He has tried OTC cough suppressant for the symptoms. The treatment provided no relief. His past medical history is significant for COPD and pneumonia (last month, recently hospitalized).   Short of breath with conversation  Describes feeling like he can only walk 5-10 feet before feeling short of breath  Left side/back/chest pain  Recently hospitalized with pneumonia, one month ago.    The following portions of the patient's history were reviewed and updated as appropriate: allergies, current medications, past family history, past medical history, past social history, past surgical history and problem list.    Review of Systems   Constitutional: Positive for chills.   HENT: Positive for congestion, ear pain, postnasal drip, rhinorrhea, sinus pain, sinus pressure and sore throat.    Respiratory: Positive for cough and shortness of breath.    Cardiovascular: Positive for chest pain.   Gastrointestinal: Positive for vomiting.   Neurological: Positive for headaches.       Objective   /70  Pulse 82  Temp 97.6 °F (36.4 °C)  Ht 69\" (175.3 cm)  Wt 128 lb (58.1 kg)  SpO2 93%  BMI 18.9 kg/m2  Physical Exam   Constitutional: He is oriented to person, place, and time. He appears well-developed and well-nourished.   HENT:   Head: Normocephalic and atraumatic.   Eyes: Pupils are equal, round, and reactive to light.   Neck: Normal range " of motion. Neck supple.   Cardiovascular: Normal rate, regular rhythm and normal heart sounds.    Pulmonary/Chest: He is in respiratory distress. He has decreased breath sounds. He has no wheezes. He has no rales.   Abdominal: Soft. Bowel sounds are normal.   Musculoskeletal: Normal range of motion.   Neurological: He is alert and oriented to person, place, and time.   Skin: Skin is warm and dry.   Psychiatric: He has a normal mood and affect.   Nursing note and vitals reviewed.      Assessment/Plan   Problems Addressed this Visit        Respiratory    COPD with acute exacerbation - Primary    Relevant Medications    ipratropium-albuterol (DUO-NEB) nebulizer solution 3 mL (Completed)    triamcinolone acetonide (KENALOG-40) injection 80 mg (Completed)    benzonatate (TESSALON PERLES) 100 MG capsule    MethylPREDNISolone (MEDROL, ELISE,) 4 MG tablet    Other Relevant Orders    CBC & Differential (Completed)    XR Chest 2 View (Completed)    CBC Auto Differential (Completed)       Other    Tobacco dependence        Duoneb in office    Kenalog 80mg IM    cxr ordered and labs ordered - reviewed    Continue with inhalers, home oxygen  Refrain from tobacco use    Start tessalon for cough    Start medrol dose pack    Recheck as needed  Instructed patient on worsening symptoms - needed to go to the ER

## 2017-10-16 NOTE — TELEPHONE ENCOUNTER
Called pt and told him of results and that scripts were sent to pharmacy. Pt stated his understanding. 10/16/17

## 2017-10-16 NOTE — TELEPHONE ENCOUNTER
----- Message from Maribel Barclay MD sent at 10/16/2017 12:02 PM CDT -----  cxr - negative for pneumonia - labs are ok.  Need to start steroid tablets and cough suppressant.

## 2017-12-04 ENCOUNTER — OFFICE VISIT (OUTPATIENT)
Dept: FAMILY MEDICINE CLINIC | Facility: CLINIC | Age: 62
End: 2017-12-04

## 2017-12-04 VITALS
DIASTOLIC BLOOD PRESSURE: 70 MMHG | HEIGHT: 69 IN | TEMPERATURE: 97 F | OXYGEN SATURATION: 95 % | BODY MASS INDEX: 18.07 KG/M2 | HEART RATE: 82 BPM | SYSTOLIC BLOOD PRESSURE: 126 MMHG | WEIGHT: 122 LBS

## 2017-12-04 DIAGNOSIS — Z09 HOSPITAL DISCHARGE FOLLOW-UP: Primary | ICD-10-CM

## 2017-12-04 DIAGNOSIS — J44.1 COPD WITH ACUTE EXACERBATION (HCC): ICD-10-CM

## 2017-12-04 DIAGNOSIS — F17.200 TOBACCO DEPENDENCE SYNDROME: ICD-10-CM

## 2017-12-04 PROCEDURE — 99214 OFFICE O/P EST MOD 30 MIN: CPT | Performed by: FAMILY MEDICINE

## 2017-12-04 RX ORDER — PREDNISONE 20 MG/1
TABLET ORAL
COMMUNITY
Start: 2017-11-22 | End: 2017-12-04

## 2017-12-04 RX ORDER — BENZONATATE 100 MG/1
100 CAPSULE ORAL 3 TIMES DAILY PRN
Qty: 30 CAPSULE | Refills: 0 | Status: SHIPPED | OUTPATIENT
Start: 2017-12-04 | End: 2018-01-30

## 2017-12-04 RX ORDER — AZITHROMYCIN 250 MG/1
TABLET, FILM COATED ORAL
COMMUNITY
Start: 2017-11-22 | End: 2017-12-04

## 2017-12-04 RX ORDER — FLUCONAZOLE 100 MG/1
TABLET ORAL
COMMUNITY
Start: 2017-11-22 | End: 2017-12-04

## 2017-12-04 RX ORDER — CEFDINIR 300 MG/1
CAPSULE ORAL
COMMUNITY
Start: 2017-11-22 | End: 2017-12-04

## 2017-12-04 RX ORDER — IPRATROPIUM BROMIDE AND ALBUTEROL SULFATE 2.5; .5 MG/3ML; MG/3ML
3 SOLUTION RESPIRATORY (INHALATION) ONCE
Status: DISCONTINUED | OUTPATIENT
Start: 2017-12-04 | End: 2017-12-04

## 2017-12-04 NOTE — PROGRESS NOTES
"Subjective   Chief Complaint   Patient presents with   • hospital follow up     [facility]   • Med Refill     Kermit Corley is a 62 y.o. male.   hospital follow up ([facility]) and Med Refill    History of Present Illness     Hospital follow up from [facility], admitted for shortness of breath  Diagnosed with copd exacerbation  Admitted 11/19 and discharged 11/22  Sent home with omnicef, azithromycin, prednisone  Has finished course of medications above  Today continues to have difficulty breathing, chest tightness, cough  Having issues with insurance for home oxygen  Current oxygen saturation 95% at rest    Tobacco dependence syndrome - currently smoking about 10 cigarettes per day  Currently using nicoderm patches     The following portions of the patient's history were reviewed and updated as appropriate: allergies, current medications, past family history, past medical history, past social history, past surgical history and problem list.    Review of Systems   Constitutional: Negative for appetite change, chills, fatigue and fever.   HENT: Negative for congestion, ear pain, rhinorrhea and sore throat.    Eyes: Negative for pain.   Respiratory: Positive for cough, chest tightness, shortness of breath and wheezing.    Cardiovascular: Negative for chest pain and palpitations.   Gastrointestinal: Negative for abdominal pain, constipation, diarrhea and nausea.   Genitourinary: Negative for dysuria.   Musculoskeletal: Negative for back pain, joint swelling and neck pain.   Skin: Negative for rash.   Neurological: Negative for dizziness and headaches.       Objective   /70  Pulse 82  Temp 97 °F (36.1 °C)  Ht 69\" (175.3 cm)  Wt 122 lb (55.3 kg)  SpO2 95%  BMI 18.02 kg/m2  Physical Exam   Constitutional: He is oriented to person, place, and time. He appears well-developed and well-nourished.   HENT:   Head: Normocephalic and atraumatic.   Eyes: Pupils are equal, round, and reactive to light.   Neck: " Normal range of motion. Neck supple.   Cardiovascular: Normal rate, regular rhythm and normal heart sounds.    Pulmonary/Chest: No respiratory distress. He has decreased breath sounds. He has wheezes. He has no rales.   Abdominal: Soft. Bowel sounds are normal.   Musculoskeletal: Normal range of motion.   Neurological: He is alert and oriented to person, place, and time.   Skin: Skin is warm and dry.   Psychiatric: He has a normal mood and affect.   Nursing note and vitals reviewed.      Assessment/Plan   Problems Addressed this Visit        Respiratory    COPD with acute exacerbation    Relevant Medications    benzonatate (TESSALON PERLES) 100 MG capsule    Other Relevant Orders    XR Chest 2 View       Other    Tobacco dependence syndrome      Other Visit Diagnoses     Hospital discharge follow-up    -  Primary        Discussed and recommended smoking cessation    cxr ordered    Discussed oxygen qualifications - would like to bring in someone to accompany him this day    matteo    Recheck in 1-2 weeks

## 2017-12-05 ENCOUNTER — OFFICE VISIT (OUTPATIENT)
Dept: FAMILY MEDICINE CLINIC | Facility: CLINIC | Age: 62
End: 2017-12-05

## 2017-12-05 VITALS
TEMPERATURE: 98 F | DIASTOLIC BLOOD PRESSURE: 76 MMHG | HEIGHT: 69 IN | WEIGHT: 122 LBS | SYSTOLIC BLOOD PRESSURE: 126 MMHG | HEART RATE: 105 BPM | BODY MASS INDEX: 18.07 KG/M2 | OXYGEN SATURATION: 82 %

## 2017-12-05 DIAGNOSIS — Z99.81 HISTORY OF HOME OXYGEN THERAPY: ICD-10-CM

## 2017-12-05 DIAGNOSIS — R79.81 LOW OXYGEN SATURATION: ICD-10-CM

## 2017-12-05 DIAGNOSIS — F17.200 TOBACCO DEPENDENCE SYNDROME: ICD-10-CM

## 2017-12-05 DIAGNOSIS — J44.9 COPD, SEVERE (HCC): Primary | ICD-10-CM

## 2017-12-05 PROCEDURE — 99214 OFFICE O/P EST MOD 30 MIN: CPT | Performed by: FAMILY MEDICINE

## 2017-12-05 RX ORDER — IPRATROPIUM BROMIDE AND ALBUTEROL SULFATE 2.5; .5 MG/3ML; MG/3ML
3 SOLUTION RESPIRATORY (INHALATION) ONCE
Status: DISCONTINUED | OUTPATIENT
Start: 2017-12-05 | End: 2018-01-30

## 2017-12-05 NOTE — PROGRESS NOTES
"Subjective   Chief Complaint   Patient presents with   • qualifying for oxygen test     Kermit Corley is a 62 y.o. male.   qualifying for oxygen test    History of Present Illness     Presents today to qualify for oxygen  Diagnosed with severe copd  Currently using spiriva, albuterol nebulizer, singulair  Previously used home oxygen but had issues with insurance coverage  At rest oxygen 95%  His oxygen level dropped to 83% with activity - produced respiratory distress    The following portions of the patient's history were reviewed and updated as appropriate: allergies, current medications, past family history, past medical history, past social history, past surgical history and problem list.    Review of Systems   Constitutional: Negative for appetite change, chills, fatigue and fever.   HENT: Negative for congestion, ear pain, rhinorrhea and sore throat.    Eyes: Negative for pain.   Respiratory: Positive for cough, chest tightness and shortness of breath.    Cardiovascular: Negative for chest pain and palpitations.   Gastrointestinal: Negative for abdominal pain, constipation, diarrhea and nausea.   Genitourinary: Negative for dysuria.   Musculoskeletal: Negative for back pain, joint swelling and neck pain.   Skin: Negative for rash.   Neurological: Negative for dizziness and headaches.       Objective   /76  Pulse 105  Temp 98 °F (36.7 °C)  Ht 175.3 cm (69.02\")  Wt 55.3 kg (122 lb)  SpO2 (!) 82% Comment: after walking  BMI 18.01 kg/m2  Physical Exam   Constitutional: He is oriented to person, place, and time. He appears well-developed and well-nourished.   HENT:   Head: Normocephalic and atraumatic.   Eyes: Pupils are equal, round, and reactive to light.   Neck: Normal range of motion. Neck supple.   Cardiovascular: Normal rate, regular rhythm and normal heart sounds.    Pulmonary/Chest: No respiratory distress. He has decreased breath sounds. He has wheezes. He has no rales.   Abdominal: " Soft. Bowel sounds are normal.   Musculoskeletal: Normal range of motion.   Neurological: He is alert and oriented to person, place, and time.   Skin: Skin is warm and dry.   Psychiatric: He has a normal mood and affect.   Nursing note and vitals reviewed.      Assessment/Plan   Problems Addressed this Visit        Respiratory    COPD, severe - Primary    Relevant Medications    ipratropium-albuterol (DUO-NEB) nebulizer solution 3 mL (Start on 12/5/2017  2:15 PM)       Other    Tobacco dependence syndrome      Other Visit Diagnoses     History of home oxygen therapy        Low oxygen saturation            [facility]    cxr reviewed - severe copd    Appointment with pulmonology scheduled    duoneb today    Discussed and stressed the importance of smoking cessation    Recheck in 3 months or sooner as needed

## 2017-12-13 ENCOUNTER — OFFICE VISIT (OUTPATIENT)
Dept: PULMONOLOGY | Facility: CLINIC | Age: 62
End: 2017-12-13

## 2017-12-13 VITALS
WEIGHT: 124.5 LBS | SYSTOLIC BLOOD PRESSURE: 120 MMHG | OXYGEN SATURATION: 98 % | HEART RATE: 85 BPM | BODY MASS INDEX: 18.44 KG/M2 | HEIGHT: 69 IN | DIASTOLIC BLOOD PRESSURE: 76 MMHG

## 2017-12-13 DIAGNOSIS — J96.10 CHRONIC RESPIRATORY FAILURE, UNSPECIFIED WHETHER WITH HYPOXIA OR HYPERCAPNIA (HCC): ICD-10-CM

## 2017-12-13 DIAGNOSIS — J44.1 COPD EXACERBATION (HCC): Primary | ICD-10-CM

## 2017-12-13 PROBLEM — K46.9 HERNIA OF ABDOMINAL CAVITY: Status: ACTIVE | Noted: 2017-12-13

## 2017-12-13 PROBLEM — R60.9 EDEMA: Status: ACTIVE | Noted: 2017-12-13

## 2017-12-13 PROBLEM — J96.12 CHRONIC RESPIRATORY FAILURE WITH HYPERCAPNIA (HCC): Status: ACTIVE | Noted: 2017-12-13

## 2017-12-13 PROBLEM — Z46.82 ENCOUNTER FOR CHEST TUBE PLACEMENT: Status: ACTIVE | Noted: 2017-12-13

## 2017-12-13 PROBLEM — J93.9 PNEUMOTHORAX, RIGHT: Status: ACTIVE | Noted: 2017-12-13

## 2017-12-13 PROBLEM — R04.2 HEMOPTYSIS: Status: ACTIVE | Noted: 2017-12-13

## 2017-12-13 PROBLEM — R07.9 CHEST PAIN: Status: ACTIVE | Noted: 2017-12-13

## 2017-12-13 PROBLEM — A41.9 SEPSIS (HCC): Status: ACTIVE | Noted: 2017-12-13

## 2017-12-13 PROBLEM — J18.9 PNEUMONIA: Status: ACTIVE | Noted: 2017-12-13

## 2017-12-13 PROBLEM — G47.00 INSOMNIA: Status: ACTIVE | Noted: 2017-08-18

## 2017-12-13 PROBLEM — J44.9 CHRONIC BRONCHITIS WITH COPD (CHRONIC OBSTRUCTIVE PULMONARY DISEASE) (HCC): Status: ACTIVE | Noted: 2017-12-13

## 2017-12-13 PROBLEM — J45.909 ASTHMA: Status: ACTIVE | Noted: 2017-12-13

## 2017-12-13 PROBLEM — E83.42 HYPOMAGNESEMIA: Status: ACTIVE | Noted: 2017-12-13

## 2017-12-13 PROBLEM — J96.90 RESPIRATORY FAILURE (HCC): Status: ACTIVE | Noted: 2017-12-13

## 2017-12-13 PROBLEM — Z72.0 TOBACCO ABUSE: Status: ACTIVE | Noted: 2017-12-13

## 2017-12-13 PROCEDURE — 99214 OFFICE O/P EST MOD 30 MIN: CPT | Performed by: INTERNAL MEDICINE

## 2017-12-13 PROCEDURE — 96372 THER/PROPH/DIAG INJ SC/IM: CPT | Performed by: INTERNAL MEDICINE

## 2017-12-13 RX ORDER — PREDNISONE 10 MG/1
TABLET ORAL
Qty: 21 TABLET | Refills: 0 | Status: SHIPPED | OUTPATIENT
Start: 2017-12-13 | End: 2018-01-23 | Stop reason: HOSPADM

## 2017-12-13 RX ORDER — DOXYCYCLINE 100 MG/1
CAPSULE ORAL
Qty: 20 CAPSULE | Refills: 0 | Status: SHIPPED | OUTPATIENT
Start: 2017-12-13 | End: 2018-01-23 | Stop reason: HOSPADM

## 2017-12-13 RX ORDER — METHYLPREDNISOLONE ACETATE 40 MG/ML
80 INJECTION, SUSPENSION INTRA-ARTICULAR; INTRALESIONAL; INTRAMUSCULAR; SOFT TISSUE ONCE
Status: COMPLETED | OUTPATIENT
Start: 2017-12-13 | End: 2017-12-13

## 2017-12-13 RX ADMIN — METHYLPREDNISOLONE ACETATE 80 MG: 40 INJECTION, SUSPENSION INTRA-ARTICULAR; INTRALESIONAL; INTRAMUSCULAR; SOFT TISSUE at 15:17

## 2017-12-13 NOTE — PROGRESS NOTES
"This gentleman has severe COPD with recent hospitalization.  He continues to have cough wheeze shortness of breath purulent sputum and dyspnea on minimal exertion    ROS    Constitutional-no night sweats weight loss headaches  GI no abdominal pain nausea or diarrhea  Neuro no seizure or neurologic deficits  Musculoskeletal no deformity or joint pain   no dysuria or hematuria  Skin no rash or other lesions  All other systems reviewed and were negative except for the above.      Physical Exam  /76 (BP Location: Right arm, Patient Position: Sitting, Cuff Size: Adult)  Pulse 85  Ht 175.3 cm (69.02\")  Wt 56.5 kg (124 lb 8 oz)  SpO2 98%  BMI 18.37 kg/m2  Vital signs as above  Pupils equally round and reactive to light and accommodation, neck no JVD or adenopathy.  Cardiovascular regular rhythm and rate no murmur or gallop.  Abdomen soft no organomegaly tenderness.  Extremities no clubbing cyanosis or edema.  No cervical adenopathy.  No skin rash.  Neurologic good strength bilaterally without deficits  Dyspneic white male with diminished breath sounds wheezes and rhonchi bilaterally    Impression COPD exacerbation with bronchitis    Plan Depo-Medrol, the anoro, prednisone, doxycycline, return in 2 weeks          This document has been electronically signed by Aba Lee MD on December 13, 2017 3:12 PM      "

## 2017-12-22 DIAGNOSIS — J44.9 CHRONIC OBSTRUCTIVE PULMONARY DISEASE, UNSPECIFIED COPD TYPE (HCC): ICD-10-CM

## 2017-12-27 RX ORDER — MONTELUKAST SODIUM 10 MG/1
TABLET ORAL
Qty: 30 TABLET | Refills: 5 | Status: SHIPPED | OUTPATIENT
Start: 2017-12-27 | End: 2018-08-09

## 2017-12-29 ENCOUNTER — OFFICE VISIT (OUTPATIENT)
Dept: PULMONOLOGY | Facility: CLINIC | Age: 62
End: 2017-12-29

## 2017-12-29 VITALS
SYSTOLIC BLOOD PRESSURE: 127 MMHG | WEIGHT: 128.8 LBS | DIASTOLIC BLOOD PRESSURE: 81 MMHG | HEIGHT: 69 IN | HEART RATE: 106 BPM | OXYGEN SATURATION: 92 % | BODY MASS INDEX: 19.08 KG/M2

## 2017-12-29 DIAGNOSIS — J96.10 CHRONIC RESPIRATORY FAILURE, UNSPECIFIED WHETHER WITH HYPOXIA OR HYPERCAPNIA (HCC): ICD-10-CM

## 2017-12-29 DIAGNOSIS — J44.1 COPD EXACERBATION (HCC): Primary | ICD-10-CM

## 2017-12-29 PROCEDURE — 99213 OFFICE O/P EST LOW 20 MIN: CPT | Performed by: INTERNAL MEDICINE

## 2017-12-29 PROCEDURE — 96372 THER/PROPH/DIAG INJ SC/IM: CPT | Performed by: INTERNAL MEDICINE

## 2017-12-29 RX ORDER — TRAZODONE HYDROCHLORIDE 50 MG/1
50 TABLET ORAL NIGHTLY
COMMUNITY
Start: 2017-12-18 | End: 2018-01-30

## 2017-12-29 RX ORDER — METHYLPREDNISOLONE ACETATE 40 MG/ML
80 INJECTION, SUSPENSION INTRA-ARTICULAR; INTRALESIONAL; INTRAMUSCULAR; SOFT TISSUE ONCE
Status: COMPLETED | OUTPATIENT
Start: 2017-12-29 | End: 2017-12-29

## 2017-12-29 RX ORDER — PREDNISONE 20 MG/1
20 TABLET ORAL DAILY
Qty: 30 TABLET | Refills: 0 | Status: SHIPPED | OUTPATIENT
Start: 2017-12-29 | End: 2018-01-23 | Stop reason: HOSPADM

## 2017-12-29 RX ORDER — DOXYCYCLINE 100 MG/1
CAPSULE ORAL
Qty: 20 CAPSULE | Refills: 0 | Status: SHIPPED | OUTPATIENT
Start: 2017-12-29 | End: 2018-01-23 | Stop reason: HOSPADM

## 2017-12-29 RX ADMIN — METHYLPREDNISOLONE ACETATE 80 MG: 40 INJECTION, SUSPENSION INTRA-ARTICULAR; INTRALESIONAL; INTRAMUSCULAR; SOFT TISSUE at 14:41

## 2017-12-29 NOTE — PROGRESS NOTES
"This gentleman has advanced COPD.  He continues to smoke and complains of increasing shortness of breath cough wheeze and dyspnea on minimal exertion    ROS    Constitutional-no night sweats weight loss headaches  GI no abdominal pain nausea or diarrhea  Neuro no seizure or neurologic deficits  Musculoskeletal no deformity or joint pain   no dysuria or hematuria  Skin no rash or other lesions  All other systems reviewed and were negative except for the above.      Physical Exam  /81 (BP Location: Left arm, Patient Position: Sitting, Cuff Size: Adult)  Pulse 106  Ht 175.3 cm (69\")  Wt 58.4 kg (128 lb 12.8 oz)  SpO2 92%  BMI 19.02 kg/m2  Vital signs as above  Pupils equally round and reactive to light and accommodation, neck no JVD or adenopathy.  Cardiovascular regular rhythm and rate no murmur or gallop.  Abdomen soft no organomegaly tenderness.  Extremities no clubbing cyanosis or edema.  No cervical adenopathy.  No skin rash.  Neurologic good strength bilaterally without deficits  Lungs reveal wheezes and rhonchi bilaterally    Impression COPD exacerbation    Plan Depo-Medrol, prednisone, bevespi, doxycycline, return in 3 weeks          This document has been electronically signed by Aba Lee MD on December 29, 2017 2:36 PM      "

## 2018-01-19 ENCOUNTER — APPOINTMENT (OUTPATIENT)
Dept: GENERAL RADIOLOGY | Facility: HOSPITAL | Age: 63
End: 2018-01-19

## 2018-01-19 ENCOUNTER — OFFICE VISIT (OUTPATIENT)
Dept: PULMONOLOGY | Facility: CLINIC | Age: 63
End: 2018-01-19

## 2018-01-19 ENCOUNTER — HOSPITAL ENCOUNTER (INPATIENT)
Facility: HOSPITAL | Age: 63
LOS: 4 days | Discharge: HOME | End: 2018-01-23
Attending: INTERNAL MEDICINE | Admitting: INTERNAL MEDICINE

## 2018-01-19 VITALS
OXYGEN SATURATION: 88 % | DIASTOLIC BLOOD PRESSURE: 88 MMHG | HEART RATE: 123 BPM | WEIGHT: 128 LBS | BODY MASS INDEX: 18.96 KG/M2 | SYSTOLIC BLOOD PRESSURE: 140 MMHG | HEIGHT: 69 IN

## 2018-01-19 DIAGNOSIS — J44.1 COPD EXACERBATION (HCC): Primary | ICD-10-CM

## 2018-01-19 DIAGNOSIS — Z78.9 IMPAIRED MOBILITY AND ACTIVITIES OF DAILY LIVING: ICD-10-CM

## 2018-01-19 DIAGNOSIS — Z74.09 IMPAIRED MOBILITY: Primary | ICD-10-CM

## 2018-01-19 DIAGNOSIS — Z74.09 IMPAIRED MOBILITY AND ACTIVITIES OF DAILY LIVING: ICD-10-CM

## 2018-01-19 DIAGNOSIS — J96.00 ACUTE RESPIRATORY FAILURE, UNSPECIFIED WHETHER WITH HYPOXIA OR HYPERCAPNIA (HCC): ICD-10-CM

## 2018-01-19 PROBLEM — R91.1 PULMONARY NODULE: Status: ACTIVE | Noted: 2018-01-19

## 2018-01-19 LAB
ALBUMIN SERPL-MCNC: 3.7 G/DL (ref 3.4–4.8)
ALBUMIN/GLOB SERPL: 1.4 G/DL (ref 1.1–1.8)
ALP SERPL-CCNC: 70 U/L (ref 38–126)
ALT SERPL W P-5'-P-CCNC: 38 U/L (ref 21–72)
ANION GAP SERPL CALCULATED.3IONS-SCNC: 9 MMOL/L (ref 5–15)
ARTERIAL PATENCY WRIST A: ABNORMAL
AST SERPL-CCNC: 22 U/L (ref 17–59)
ATMOSPHERIC PRESS: ABNORMAL MMHG
BASE EXCESS BLDA CALC-SCNC: 5.3 MMOL/L (ref -2.4–2.4)
BASOPHILS # BLD AUTO: 0 10*3/MM3 (ref 0–0.2)
BASOPHILS NFR BLD AUTO: 0 % (ref 0–2)
BDY SITE: ABNORMAL
BILIRUB SERPL-MCNC: 0.5 MG/DL (ref 0.2–1.3)
BILIRUB UR QL STRIP: NEGATIVE
BUN BLD-MCNC: 14 MG/DL (ref 7–21)
BUN/CREAT SERPL: 19.7 (ref 7–25)
CA-I BLD-MCNC: 4.7 MG/DL (ref 4.5–4.9)
CALCIUM SPEC-SCNC: 9.8 MG/DL (ref 8.4–10.2)
CHLORIDE SERPL-SCNC: 94 MMOL/L (ref 95–110)
CLARITY UR: ABNORMAL
CO2 BLDA-SCNC: 31.5 MMOL/L (ref 23–27)
CO2 SERPL-SCNC: 31 MMOL/L (ref 22–31)
COLOR UR: YELLOW
CREAT BLD-MCNC: 0.71 MG/DL (ref 0.7–1.3)
DEPRECATED RDW RBC AUTO: 43.4 FL (ref 35.1–43.9)
EOSINOPHIL # BLD AUTO: 0 10*3/MM3 (ref 0–0.7)
EOSINOPHIL NFR BLD AUTO: 0 % (ref 0–7)
ERYTHROCYTE [DISTWIDTH] IN BLOOD BY AUTOMATED COUNT: 13.3 % (ref 11.5–14.5)
FLUAV AG NPH QL: NEGATIVE
FLUBV AG NPH QL IA: NEGATIVE
GFR SERPL CREATININE-BSD FRML MDRD: 112 ML/MIN/1.73 (ref 49–113)
GLOBULIN UR ELPH-MCNC: 2.6 GM/DL (ref 2.3–3.5)
GLUCOSE BLD-MCNC: 107 MG/DL (ref 60–100)
GLUCOSE BLDA-MCNC: 117 MMOL/L
GLUCOSE UR STRIP-MCNC: NEGATIVE MG/DL
HCO3 BLDA-SCNC: 30.2 MMOL/L (ref 22–26)
HCT VFR BLD AUTO: 42.4 % (ref 39–49)
HCT VFR BLD CALC: 44 % (ref 40–54)
HGB BLD-MCNC: 14.1 G/DL (ref 13.7–17.3)
HGB BLDA-MCNC: 14.9 G/DL (ref 14–18)
HGB UR QL STRIP.AUTO: NEGATIVE
HOLD SPECIMEN: NORMAL
IMM GRANULOCYTES # BLD: 0.08 10*3/MM3 (ref 0–0.02)
IMM GRANULOCYTES NFR BLD: 0.5 % (ref 0–0.5)
INR PPP: 0.91 (ref 0.8–1.2)
KETONES UR QL STRIP: NEGATIVE
LEUKOCYTE ESTERASE UR QL STRIP.AUTO: NEGATIVE
LYMPHOCYTES # BLD AUTO: 0.76 10*3/MM3 (ref 0.6–4.2)
LYMPHOCYTES NFR BLD AUTO: 4.7 % (ref 10–50)
MCH RBC QN AUTO: 29.7 PG (ref 26.5–34)
MCHC RBC AUTO-ENTMCNC: 33.3 G/DL (ref 31.5–36.3)
MCV RBC AUTO: 89.3 FL (ref 80–98)
MODALITY: ABNORMAL
MONOCYTES # BLD AUTO: 1.02 10*3/MM3 (ref 0–0.9)
MONOCYTES NFR BLD AUTO: 6.4 % (ref 0–12)
NEUTROPHILS # BLD AUTO: 14.2 10*3/MM3 (ref 2–8.6)
NEUTROPHILS NFR BLD AUTO: 88.4 % (ref 37–80)
NITRITE UR QL STRIP: NEGATIVE
PCO2 BLDA: 44.9 MM HG (ref 35–45)
PH BLDA: 7.45 PH UNITS (ref 7.35–7.45)
PH UR STRIP.AUTO: 7 [PH] (ref 5–9)
PLATELET # BLD AUTO: 241 10*3/MM3 (ref 150–450)
PMV BLD AUTO: 10.8 FL (ref 8–12)
PO2 BLDA: 67.3 MM HG (ref 80–105)
POTASSIUM BLD-SCNC: 4.3 MMOL/L (ref 3.5–5.1)
POTASSIUM BLDA-SCNC: 3.92 MMOL/L (ref 3.6–4.9)
PROT SERPL-MCNC: 6.3 G/DL (ref 6.3–8.6)
PROT UR QL STRIP: NEGATIVE
PROTHROMBIN TIME: 12.1 SECONDS (ref 11.1–15.3)
RBC # BLD AUTO: 4.75 10*6/MM3 (ref 4.37–5.74)
SAO2 % BLDCOA: 94.5 % (ref 94–100)
SODIUM BLD-SCNC: 134 MMOL/L (ref 137–145)
SODIUM BLDA-SCNC: 135.8 MMOL/L (ref 138–146)
SP GR UR STRIP: 1.02 (ref 1–1.03)
UROBILINOGEN UR QL STRIP: ABNORMAL
WBC NRBC COR # BLD: 16.06 10*3/MM3 (ref 3.2–9.8)

## 2018-01-19 PROCEDURE — 94760 N-INVAS EAR/PLS OXIMETRY 1: CPT

## 2018-01-19 PROCEDURE — 36600 WITHDRAWAL OF ARTERIAL BLOOD: CPT

## 2018-01-19 PROCEDURE — 85610 PROTHROMBIN TIME: CPT | Performed by: INTERNAL MEDICINE

## 2018-01-19 PROCEDURE — 94799 UNLISTED PULMONARY SVC/PX: CPT

## 2018-01-19 PROCEDURE — 82803 BLOOD GASES ANY COMBINATION: CPT | Performed by: INTERNAL MEDICINE

## 2018-01-19 PROCEDURE — 25010000002 LEVOFLOXACIN PER 250 MG: Performed by: INTERNAL MEDICINE

## 2018-01-19 PROCEDURE — 94640 AIRWAY INHALATION TREATMENT: CPT

## 2018-01-19 PROCEDURE — 85025 COMPLETE CBC W/AUTO DIFF WBC: CPT | Performed by: INTERNAL MEDICINE

## 2018-01-19 PROCEDURE — 25010000002 METHYLPREDNISOLONE PER 125 MG: Performed by: INTERNAL MEDICINE

## 2018-01-19 PROCEDURE — 99214 OFFICE O/P EST MOD 30 MIN: CPT | Performed by: INTERNAL MEDICINE

## 2018-01-19 PROCEDURE — 87070 CULTURE OTHR SPECIMN AEROBIC: CPT | Performed by: INTERNAL MEDICINE

## 2018-01-19 PROCEDURE — 80053 COMPREHEN METABOLIC PANEL: CPT | Performed by: INTERNAL MEDICINE

## 2018-01-19 PROCEDURE — 87205 SMEAR GRAM STAIN: CPT | Performed by: INTERNAL MEDICINE

## 2018-01-19 PROCEDURE — 87804 INFLUENZA ASSAY W/OPTIC: CPT | Performed by: INTERNAL MEDICINE

## 2018-01-19 PROCEDURE — 71046 X-RAY EXAM CHEST 2 VIEWS: CPT

## 2018-01-19 PROCEDURE — 81003 URINALYSIS AUTO W/O SCOPE: CPT | Performed by: INTERNAL MEDICINE

## 2018-01-19 PROCEDURE — 25010000002 ENOXAPARIN PER 10 MG: Performed by: INTERNAL MEDICINE

## 2018-01-19 RX ORDER — BUDESONIDE AND FORMOTEROL FUMARATE DIHYDRATE 160; 4.5 UG/1; UG/1
2 AEROSOL RESPIRATORY (INHALATION)
Status: DISCONTINUED | OUTPATIENT
Start: 2018-01-19 | End: 2018-01-23 | Stop reason: HOSPADM

## 2018-01-19 RX ORDER — ACETAMINOPHEN 325 MG/1
650 TABLET ORAL EVERY 6 HOURS PRN
Status: DISCONTINUED | OUTPATIENT
Start: 2018-01-19 | End: 2018-01-23 | Stop reason: HOSPADM

## 2018-01-19 RX ORDER — METHYLPREDNISOLONE SODIUM SUCCINATE 125 MG/2ML
60 INJECTION, POWDER, LYOPHILIZED, FOR SOLUTION INTRAMUSCULAR; INTRAVENOUS EVERY 6 HOURS
Status: DISCONTINUED | OUTPATIENT
Start: 2018-01-19 | End: 2018-01-21

## 2018-01-19 RX ORDER — TRAZODONE HYDROCHLORIDE 50 MG/1
50 TABLET ORAL NIGHTLY PRN
Status: DISCONTINUED | OUTPATIENT
Start: 2018-01-19 | End: 2018-01-23 | Stop reason: HOSPADM

## 2018-01-19 RX ORDER — BENZONATATE 100 MG/1
100 CAPSULE ORAL 3 TIMES DAILY PRN
Status: DISCONTINUED | OUTPATIENT
Start: 2018-01-19 | End: 2018-01-23 | Stop reason: HOSPADM

## 2018-01-19 RX ORDER — LEVOFLOXACIN 5 MG/ML
750 INJECTION, SOLUTION INTRAVENOUS EVERY 24 HOURS
Status: DISCONTINUED | OUTPATIENT
Start: 2018-01-19 | End: 2018-01-23 | Stop reason: HOSPADM

## 2018-01-19 RX ORDER — BISACODYL 5 MG/1
10 TABLET, DELAYED RELEASE ORAL DAILY PRN
Status: DISCONTINUED | OUTPATIENT
Start: 2018-01-19 | End: 2018-01-23 | Stop reason: HOSPADM

## 2018-01-19 RX ORDER — MONTELUKAST SODIUM 10 MG/1
10 TABLET ORAL NIGHTLY
Status: DISCONTINUED | OUTPATIENT
Start: 2018-01-19 | End: 2018-01-23 | Stop reason: HOSPADM

## 2018-01-19 RX ORDER — IPRATROPIUM BROMIDE AND ALBUTEROL SULFATE 2.5; .5 MG/3ML; MG/3ML
3 SOLUTION RESPIRATORY (INHALATION)
Status: DISCONTINUED | OUTPATIENT
Start: 2018-01-19 | End: 2018-01-23 | Stop reason: HOSPADM

## 2018-01-19 RX ORDER — FAMOTIDINE 20 MG/1
20 TABLET, FILM COATED ORAL DAILY
Status: DISCONTINUED | OUTPATIENT
Start: 2018-01-19 | End: 2018-01-20

## 2018-01-19 RX ORDER — SODIUM CHLORIDE 0.9 % (FLUSH) 0.9 %
SYRINGE (ML) INJECTION
Status: DISPENSED
Start: 2018-01-19 | End: 2018-01-20

## 2018-01-19 RX ADMIN — METHYLPREDNISOLONE SODIUM SUCCINATE 60 MG: 125 INJECTION, POWDER, FOR SOLUTION INTRAMUSCULAR; INTRAVENOUS at 18:12

## 2018-01-19 RX ADMIN — FAMOTIDINE 20 MG: 20 TABLET, FILM COATED ORAL at 18:12

## 2018-01-19 RX ADMIN — ENOXAPARIN SODIUM 40 MG: 40 INJECTION SUBCUTANEOUS at 21:24

## 2018-01-19 RX ADMIN — MONTELUKAST 10 MG: 10 TABLET, FILM COATED ORAL at 21:24

## 2018-01-19 RX ADMIN — METHYLPREDNISOLONE SODIUM SUCCINATE 60 MG: 125 INJECTION, POWDER, FOR SOLUTION INTRAMUSCULAR; INTRAVENOUS at 21:30

## 2018-01-19 RX ADMIN — NICOTINE 1 PATCH: 7 PATCH, EXTENDED RELEASE TRANSDERMAL at 18:13

## 2018-01-19 RX ADMIN — IPRATROPIUM BROMIDE AND ALBUTEROL SULFATE 3 ML: 2.5; .5 SOLUTION RESPIRATORY (INHALATION) at 23:19

## 2018-01-19 RX ADMIN — LEVOFLOXACIN 750 MG: 5 INJECTION, SOLUTION INTRAVENOUS at 18:14

## 2018-01-19 RX ADMIN — IPRATROPIUM BROMIDE AND ALBUTEROL SULFATE 3 ML: 2.5; .5 SOLUTION RESPIRATORY (INHALATION) at 16:12

## 2018-01-19 NOTE — H&P
[facility] Emergency Room Medicine Services  INPATIENT HISTORY AND PHYSICAL       Patient Care Team:  Maribel Barclay MD as PCP - General (Family Medicine)    Chief complaint   Shortness of breath and cough    Subjective     Patient is a 62 y.o. male with history of O2 dependent COPD and nicotine dependence who was brought in as a direct admission from the office of Dr. Lee secondary to acute exacerbation of COPD.  Patient has had progressively worsening shortness of air associated with cough productive of greenish sputum within the past 1-2 weeks.  He was seen at the [facility] emergency Department 4 days ago and commenced on outpatient treatment.  Despite the outpatient treatment including antimicrobials, patient continues to remain symptomatic.  He denies fever, chills, nausea, vomiting, chest pain, hemoptysis, syncope or presyncope.  He however does admit to weakness and diminished oral intake.     Family and social history: Patient lives alone but not far from close relatives.  He does use a cane to assist him with ambulation.  He has been smoking an average of half a pack of cigarettes a day for close to 50 years.  Denies history of alcohol use.  There is family history of hypertension and heart disease.     Review of Systems   Review of Systems   Constitutional: Positive for activity change and fatigue. Negative for appetite change, chills, diaphoresis and fever.   HENT: Negative for trouble swallowing and voice change.    Eyes: Negative for photophobia and visual disturbance.   Respiratory: Positive for cough, shortness of breath and wheezing. Negative for choking, chest tightness and stridor.    Cardiovascular: Negative for chest pain, palpitations and leg swelling.   Gastrointestinal: Negative for abdominal distention, abdominal pain, blood in stool, constipation, diarrhea, nausea and vomiting.   Endocrine: Negative for cold intolerance, heat  intolerance, polydipsia, polyphagia and polyuria.   Genitourinary: Negative for decreased urine volume, difficulty urinating, dysuria, enuresis, flank pain, frequency, hematuria and urgency.   Musculoskeletal: Positive for gait problem. Negative for arthralgias, myalgias, neck pain and neck stiffness.   Skin: Negative for pallor, rash and wound.   Neurological: Positive for tremors. Negative for dizziness, seizures, syncope, facial asymmetry, speech difficulty, weakness, light-headedness, numbness and headaches.   Hematological: Does not bruise/bleed easily.   Psychiatric/Behavioral: Negative for agitation, behavioral problems and confusion.       History  Past Medical History:   Diagnosis Date   • Acute bronchitis    • Acute exacerbation of chronic obstructive airways disease    • Chronic bronchitis    • Chronic obstructive lung disease    • Hypoxemia    • IgE deficiency     mediated allergic asthma   • Inguinal hernia     left side   • Lymphadenopathy     left groin     Past Surgical History:   Procedure Laterality Date   • HERNIA REPAIR      with mesh   • INJECTION OF MEDICATION      Celestone  (2)   • INJECTION OF MEDICATION      Depo Medrol  (2)     Family History   Problem Relation Age of Onset   • Heart disease Other    • Other Other      respiratory disorder   • Heart disease Mother    • Heart disease Father    • COPD Father    • No Known Problems Sister    • No Known Problems Brother    • No Known Problems Maternal Grandmother    • No Known Problems Maternal Grandfather    • No Known Problems Paternal Grandmother    • No Known Problems Paternal Grandfather      Social History   Substance Use Topics   • Smoking status: Light Tobacco Smoker     Packs/day: 2.00     Years: 30.00     Types: Cigarettes   • Smokeless tobacco: Current User      Comment: 2 ppd for 30 years   • Alcohol use No     Facility-Administered Medications Prior to Admission   Medication Dose Route Frequency Provider Last Rate Last Dose   •  ipratropium-albuterol (DUO-NEB) nebulizer solution 3 mL  3 mL Nebulization Once Maribel Barclay MD         Prescriptions Prior to Admission   Medication Sig Dispense Refill Last Dose   • albuterol (PROVENTIL HFA;VENTOLIN HFA) 108 (90 BASE) MCG/ACT inhaler Inhale 2 puffs Every 6 (Six) Hours As Needed for Wheezing or Shortness of Air. 1 inhaler 11 Taking   • albuterol (PROVENTIL) (2.5 MG/3ML) 0.083% nebulizer solution USE ONE VIAL IN NEBULIZER EVERY 4 HOURS AS NEEDED FOR WHEEZING 180 vial 1 Taking   • benzonatate (TESSALON PERLES) 100 MG capsule Take 1 capsule by mouth 3 (Three) Times a Day As Needed for Cough. 30 capsule 0 Taking   • doxycycline (MONODOX) 100 MG capsule 1 dose by mouth twice a day 20 capsule 0 Taking   • doxycycline (MONODOX) 100 MG capsule 1 dose by mouth twice a day 20 capsule 0 Taking   • ipratropium (ATROVENT) 0.02 % nebulizer solution FOUR TIMES DAILY   Taking   • IPRATROPIUM-ALBUTEROL IN Inhale 3 mL every 4 (four) hours as needed.   Taking   • montelukast (SINGULAIR) 10 MG tablet TAKE ONE TABLET BY MOUTH EVERY NIGHT 30 tablet 5 Taking   • nicotine (NICODERM CQ) 7 MG/24HR patch DAILY   Taking   • predniSONE (DELTASONE) 10 MG tablet 2 tablets by mouth daily for 1 week, then 1 tablet daily for 1 week 21 tablet 0 Taking   • predniSONE (DELTASONE) 20 MG tablet Take 1 tablet by mouth Daily. 30 tablet 0 Taking   • Respiratory Therapy Supplies (NEBULIZER) device As needed for inhalation prn   Taking   • tiotropium (SPIRIVA HANDIHALER) 18 MCG per inhalation capsule Place 1 capsule into inhaler and inhale Daily. 30 capsule 5 Taking   • traZODone (DESYREL) 50 MG tablet    Taking     Allergies:  Review of patient's allergies indicates no known allergies.  Prior to Admission medications    Medication Sig Start Date End Date Taking? Authorizing Provider   albuterol (PROVENTIL HFA;VENTOLIN HFA) 108 (90 BASE) MCG/ACT inhaler Inhale 2 puffs Every 6 (Six) Hours As Needed for Wheezing or Shortness of Air.  6/2/17   Maribel Barclay MD   albuterol (PROVENTIL) (2.5 MG/3ML) 0.083% nebulizer solution USE ONE VIAL IN NEBULIZER EVERY 4 HOURS AS NEEDED FOR WHEEZING 9/22/17   Maribel Barclay MD   benzonatate (TESSALON PERLES) 100 MG capsule Take 1 capsule by mouth 3 (Three) Times a Day As Needed for Cough. 12/4/17   Maribel Barclay MD   doxycycline (MONODOX) 100 MG capsule 1 dose by mouth twice a day 12/13/17   Aba Lee MD   doxycycline (MONODOX) 100 MG capsule 1 dose by mouth twice a day 12/29/17   Aba Lee MD   ipratropium (ATROVENT) 0.02 % nebulizer solution FOUR TIMES DAILY 8/16/17   Historical Provider, MD   IPRATROPIUM-ALBUTEROL IN Inhale 3 mL every 4 (four) hours as needed.    Historical Provider, MD   montelukast (SINGULAIR) 10 MG tablet TAKE ONE TABLET BY MOUTH EVERY NIGHT 12/27/17   Maribel Barclay MD   nicotine (NICODERM CQ) 7 MG/24HR patch DAILY 11/22/17   Historical Provider, MD   predniSONE (DELTASONE) 10 MG tablet 2 tablets by mouth daily for 1 week, then 1 tablet daily for 1 week 12/13/17   Aba Lee MD   predniSONE (DELTASONE) 20 MG tablet Take 1 tablet by mouth Daily. 12/29/17   Aba Lee MD   Respiratory Therapy Supplies (NEBULIZER) device As needed for inhalation prn    Historical Provider, MD   tiotropium (SPIRIVA HANDIHALER) 18 MCG per inhalation capsule Place 1 capsule into inhaler and inhale Daily. 6/20/17   Maribel Barclay MD   traZODone (DESYREL) 50 MG tablet  12/18/17   Historical Provider, MD       Objective     Vital Signs    Temp:  [98.6 °F (37 °C)] 98.6 °F (37 °C)  Heart Rate:  [113-123] 113  Resp:  [24] 24  BP: (140-142)/(87-88) 142/87    Physical Exam:      Physical Exam   Constitutional: He is oriented to person, place, and time. He appears well-developed and well-nourished. He appears distressed.   HENT:   Head: Normocephalic and atraumatic.   Eyes: EOM are normal. Pupils are equal, round, and reactive to light. No scleral icterus.   Neck:  Normal range of motion. Neck supple. No JVD present. No thyromegaly present.   Cardiovascular: Regular rhythm and normal heart sounds.  Tachycardia present.  Exam reveals no gallop and no friction rub.    No murmur heard.  Pulmonary/Chest: Effort normal. He has decreased breath sounds. He has wheezes. He has rhonchi. He has no rales. He exhibits no tenderness.   He has profound distant breath sounds bilaterally.   Abdominal: Soft. Bowel sounds are normal. He exhibits no distension and no mass. There is no tenderness. There is no rebound and no guarding.   Musculoskeletal: He exhibits no edema, tenderness or deformity.   Neurological: He is alert and oriented to person, place, and time. He displays tremor. No cranial nerve deficit. He exhibits normal muscle tone. Coordination normal.   Skin: Skin is warm and dry. No rash noted. He is not diaphoretic. No erythema. No pallor.   Psychiatric: He has a normal mood and affect. His behavior is normal. Judgment and thought content normal.   Nursing note and vitals reviewed.      Results Review:   Pending        Invalid input(s): LABALBU, PROT                    Imaging Results (last 7 days)     ** No results found for the last 168 hours. **          Assessment/Plan   1. Exacerbation of COPD: Patient will be admitted, continued on supplemental oxygen and started on bronchodilators, steroids and empiric antimicrobial therapy.  Sputum will be sent for respiratory culture and patient will have an influenza screen.  Check Chest x-ray, routine labs to include compressive metabolic panel, CBC, ABG and urinalysis.   2. History of nicotine dependence: Continued nicotine patch.  Patient has been advised on the need to quit smoking.  3. Begin GI and DVT prophylaxis.  4.  Further treatment plans or diagnostic studies as hospital course dictates.               I discussed the patients findings and my recommendations with patient and his daughter-in-law.     Michael Paul,  MD  01/19/18  3:48 PM        Total Time Spent: 55 minutes    EMR Dragon/Transcription disclaimer:   Much of this encounter note is an electronic transcription/translation of spoken language to printed text. The electronic translation of spoken language may permit erroneous, or at times, nonsensical words or phrases to be inadvertently transcribed; Although I have reviewed the note for such errors, some may still exist.

## 2018-01-19 NOTE — PLAN OF CARE
Problem: Respiratory Insufficiency (Adult)  Goal: Identify Related Risk Factors and Signs and Symptoms  Outcome: Outcome(s) achieved Date Met: 01/19/18    Goal: Acid/Base Balance  Outcome: Ongoing (interventions implemented as appropriate)    Goal: Effective Ventilation  Outcome: Ongoing (interventions implemented as appropriate)      Problem: Fall Risk (Adult)  Goal: Absence of Falls  Outcome: Ongoing (interventions implemented as appropriate)

## 2018-01-19 NOTE — PROGRESS NOTES
"This gentleman has advanced COPD and hypoxemia with chronic respiratory failure he complains of a several day history of increasing shortness of breath cough wheeze purulent sputum and dyspnea on exertion.  He uses oxygen.    ROS    Constitutional-no night sweats weight loss headaches  GI no abdominal pain nausea or diarrhea  Neuro no seizure or neurologic deficits  Musculoskeletal no deformity or joint pain   no dysuria or hematuria  Skin no rash or other lesions  All other systems reviewed and were negative except for the above.      Physical Exam  /88 (BP Location: Left arm, Patient Position: Sitting, Cuff Size: Adult)  Pulse (!) 123  Ht 175.3 cm (69\")  Wt 58.1 kg (128 lb)  SpO2 (!) 88%  BMI 18.9 kg/m2  Vital signs as above  Pupils equally round and reactive to light and accommodation, neck no JVD or adenopathy.  Cardiovascular regular rhythm and rate no murmur or gallop.  Abdomen soft no organomegaly tenderness.  Extremities no clubbing cyanosis or edema.  No cervical adenopathy.  No skin rash.  Neurologic good strength bilaterally without deficits  Chronically ill-appearing white male markedly short of breath with severe wheezes and rhonchi.  O2 saturation is 88% on 2 L nasal    Impression advanced COPD with acute on chronic respiratory failure rule out pneumonia recommendations admission to the hospital          This document has been electronically signed by Aba Lee MD on January 19, 2018 2:46 PM      "

## 2018-01-20 PROBLEM — J44.9 COPD (CHRONIC OBSTRUCTIVE PULMONARY DISEASE) (HCC): Status: ACTIVE | Noted: 2018-01-20

## 2018-01-20 LAB
ANION GAP SERPL CALCULATED.3IONS-SCNC: 8 MMOL/L (ref 5–15)
BASOPHILS # BLD AUTO: 0 10*3/MM3 (ref 0–0.2)
BASOPHILS NFR BLD AUTO: 0 % (ref 0–2)
BUN BLD-MCNC: 21 MG/DL (ref 7–21)
BUN/CREAT SERPL: 30 (ref 7–25)
CALCIUM SPEC-SCNC: 9.5 MG/DL (ref 8.4–10.2)
CHLORIDE SERPL-SCNC: 97 MMOL/L (ref 95–110)
CO2 SERPL-SCNC: 30 MMOL/L (ref 22–31)
CREAT BLD-MCNC: 0.7 MG/DL (ref 0.7–1.3)
DEPRECATED RDW RBC AUTO: 43.3 FL (ref 35.1–43.9)
EOSINOPHIL # BLD AUTO: 0 10*3/MM3 (ref 0–0.7)
EOSINOPHIL NFR BLD AUTO: 0 % (ref 0–7)
ERYTHROCYTE [DISTWIDTH] IN BLOOD BY AUTOMATED COUNT: 13.2 % (ref 11.5–14.5)
GFR SERPL CREATININE-BSD FRML MDRD: 114 ML/MIN/1.73 (ref 49–113)
GLUCOSE BLD-MCNC: 164 MG/DL (ref 60–100)
HCT VFR BLD AUTO: 39.1 % (ref 39–49)
HGB BLD-MCNC: 13 G/DL (ref 13.7–17.3)
IMM GRANULOCYTES # BLD: 0.06 10*3/MM3 (ref 0–0.02)
IMM GRANULOCYTES NFR BLD: 0.6 % (ref 0–0.5)
LYMPHOCYTES # BLD AUTO: 0.34 10*3/MM3 (ref 0.6–4.2)
LYMPHOCYTES NFR BLD AUTO: 3.2 % (ref 10–50)
MCH RBC QN AUTO: 29.7 PG (ref 26.5–34)
MCHC RBC AUTO-ENTMCNC: 33.2 G/DL (ref 31.5–36.3)
MCV RBC AUTO: 89.5 FL (ref 80–98)
MONOCYTES # BLD AUTO: 0.4 10*3/MM3 (ref 0–0.9)
MONOCYTES NFR BLD AUTO: 3.8 % (ref 0–12)
NEUTROPHILS # BLD AUTO: 9.82 10*3/MM3 (ref 2–8.6)
NEUTROPHILS NFR BLD AUTO: 92.4 % (ref 37–80)
PLATELET # BLD AUTO: 221 10*3/MM3 (ref 150–450)
PMV BLD AUTO: 10.7 FL (ref 8–12)
POTASSIUM BLD-SCNC: 4 MMOL/L (ref 3.5–5.1)
RBC # BLD AUTO: 4.37 10*6/MM3 (ref 4.37–5.74)
SODIUM BLD-SCNC: 135 MMOL/L (ref 137–145)
WBC NRBC COR # BLD: 10.62 10*3/MM3 (ref 3.2–9.8)

## 2018-01-20 PROCEDURE — 94799 UNLISTED PULMONARY SVC/PX: CPT

## 2018-01-20 PROCEDURE — 80048 BASIC METABOLIC PNL TOTAL CA: CPT | Performed by: INTERNAL MEDICINE

## 2018-01-20 PROCEDURE — 85025 COMPLETE CBC W/AUTO DIFF WBC: CPT | Performed by: INTERNAL MEDICINE

## 2018-01-20 PROCEDURE — 94640 AIRWAY INHALATION TREATMENT: CPT

## 2018-01-20 PROCEDURE — 25010000002 LEVOFLOXACIN PER 250 MG: Performed by: INTERNAL MEDICINE

## 2018-01-20 PROCEDURE — 94760 N-INVAS EAR/PLS OXIMETRY 1: CPT

## 2018-01-20 PROCEDURE — 25010000002 ENOXAPARIN PER 10 MG: Performed by: INTERNAL MEDICINE

## 2018-01-20 PROCEDURE — 25010000002 METHYLPREDNISOLONE PER 125 MG: Performed by: INTERNAL MEDICINE

## 2018-01-20 RX ORDER — FAMOTIDINE 10 MG/ML
20 INJECTION, SOLUTION INTRAVENOUS EVERY 12 HOURS SCHEDULED
Status: DISCONTINUED | OUTPATIENT
Start: 2018-01-20 | End: 2018-01-23 | Stop reason: HOSPADM

## 2018-01-20 RX ADMIN — ENOXAPARIN SODIUM 40 MG: 40 INJECTION SUBCUTANEOUS at 21:27

## 2018-01-20 RX ADMIN — METHYLPREDNISOLONE SODIUM SUCCINATE 60 MG: 125 INJECTION, POWDER, FOR SOLUTION INTRAMUSCULAR; INTRAVENOUS at 05:28

## 2018-01-20 RX ADMIN — METHYLPREDNISOLONE SODIUM SUCCINATE 60 MG: 125 INJECTION, POWDER, FOR SOLUTION INTRAMUSCULAR; INTRAVENOUS at 10:01

## 2018-01-20 RX ADMIN — FAMOTIDINE 20 MG: 20 TABLET, FILM COATED ORAL at 08:16

## 2018-01-20 RX ADMIN — IPRATROPIUM BROMIDE AND ALBUTEROL SULFATE 3 ML: 2.5; .5 SOLUTION RESPIRATORY (INHALATION) at 18:39

## 2018-01-20 RX ADMIN — LEVOFLOXACIN 750 MG: 5 INJECTION, SOLUTION INTRAVENOUS at 16:03

## 2018-01-20 RX ADMIN — BUDESONIDE AND FORMOTEROL FUMARATE DIHYDRATE 2 PUFF: 160; 4.5 AEROSOL RESPIRATORY (INHALATION) at 18:39

## 2018-01-20 RX ADMIN — FAMOTIDINE 20 MG: 10 INJECTION, SOLUTION INTRAVENOUS at 21:27

## 2018-01-20 RX ADMIN — METHYLPREDNISOLONE SODIUM SUCCINATE 60 MG: 125 INJECTION, POWDER, FOR SOLUTION INTRAMUSCULAR; INTRAVENOUS at 23:43

## 2018-01-20 RX ADMIN — METHYLPREDNISOLONE SODIUM SUCCINATE 60 MG: 125 INJECTION, POWDER, FOR SOLUTION INTRAMUSCULAR; INTRAVENOUS at 16:04

## 2018-01-20 RX ADMIN — NICOTINE 1 PATCH: 7 PATCH, EXTENDED RELEASE TRANSDERMAL at 16:03

## 2018-01-20 RX ADMIN — IPRATROPIUM BROMIDE AND ALBUTEROL SULFATE 3 ML: 2.5; .5 SOLUTION RESPIRATORY (INHALATION) at 12:15

## 2018-01-20 RX ADMIN — MONTELUKAST 10 MG: 10 TABLET, FILM COATED ORAL at 21:27

## 2018-01-20 RX ADMIN — TRAZODONE HYDROCHLORIDE 50 MG: 50 TABLET ORAL at 21:27

## 2018-01-20 RX ADMIN — IPRATROPIUM BROMIDE AND ALBUTEROL SULFATE 3 ML: 2.5; .5 SOLUTION RESPIRATORY (INHALATION) at 05:03

## 2018-01-20 RX ADMIN — BUDESONIDE AND FORMOTEROL FUMARATE DIHYDRATE 2 PUFF: 160; 4.5 AEROSOL RESPIRATORY (INHALATION) at 12:15

## 2018-01-20 NOTE — PLAN OF CARE
Problem: Patient Care Overview (Adult)  Goal: Plan of Care Review  Outcome: Ongoing (interventions implemented as appropriate)   01/20/18 0355   Coping/Psychosocial Response Interventions   Plan Of Care Reviewed With patient   Patient Care Overview   Progress no change     Goal: Adult Individualization and Mutuality  Outcome: Ongoing (interventions implemented as appropriate)    Goal: Discharge Needs Assessment  Outcome: Ongoing (interventions implemented as appropriate)      Problem: Respiratory Insufficiency (Adult)  Goal: Acid/Base Balance  Outcome: Ongoing (interventions implemented as appropriate)    Goal: Effective Ventilation  Outcome: Ongoing (interventions implemented as appropriate)      Problem: Fall Risk (Adult)  Goal: Identify Related Risk Factors and Signs and Symptoms  Outcome: Ongoing (interventions implemented as appropriate)    Goal: Absence of Falls  Outcome: Ongoing (interventions implemented as appropriate)

## 2018-01-20 NOTE — CONSULTS
"Adult Nutrition  Assessment    Patient Name:  Kermit Corley  YOB: 1955  MRN: [id number]  Admit Date:  1/19/2018    Assessment Date:  1/20/2018    Comments:  Pt is a 62 year old male admitted with COPD exacerbation.  BMI is 18.9 which is slightly underweight.  Pt reports good appetite and says he normally eats well even when he is sick.  No GI complaints, except some coughing up sputum.  Wt has been stable per pt.  Pt will drink supplements but says he cannot afford them at home.  RD made pt aware of alternative menu options and encouraged intake.  Will add Ensure on trays to optimize protein/naif intake for weight gain.  Glucose elevated w/steroids.  RD will follow.          Reason for Assessment       01/20/18 1219    Reason for Assessment    Reason For Assessment/Visit identified at risk by screening criteria;per organizational policy    Identified At Risk By Screening Criteria BMI;weight status                Nutrition/Diet History       01/20/18 1219    Nutrition/Diet History    Typical Food/Fluid Intake Pt reports he has good appetite and usually has a good appetite even when he is sick.  No nausea but pt has been spitting up some sputum.    Supplemental Drinks/Foods/Additives Will drink supplements but cannot afford them at home.            Anthropometrics       01/20/18 1220    Anthropometrics    Height 175.3 cm (69\")    Weight 58.1 kg (128 lb)    Anthropometrics (Special Considerations)    Height Used for Calculations 1.753 m (5' 9\")    Weight Used for Calculations 58.1 kg (128 lb)    RD Calculated     RD Calculated % IBW 80    Ideal Body Weight (IBW)    Ideal Body Weight (IBW), Male (kg) 73.69    % Ideal Body Weight 78.96    Body Mass Index (BMI)    BMI (kg/m2) 18.94    BMI Grade 17 - 19 low grade I            Labs/Tests/Procedures/Meds       01/20/18 1220    Labs/Tests/Procedures/Meds    Labs/Tests Review Reviewed    Medication Review Reviewed, pertinent;Steroid;Antibiotic    " "          Estimated/Assessed Needs       01/20/18 1221    Calculation Measurements    Weight Used For Calculations 58.1 kg (128 lb)    Height Used for Calculations 1.753 m (5' 9\")    Estimated/Assessed Energy Needs    Energy Need Method [facility]    Age 62    RMR ([facility] Equation) 1360.98    Activity Factors ([facility])  --   1.4    Total estimated needs ([facility]) 1904    Estimated/Assessed Protein Needs    Weight Used for Protein Calculation 72.6 kg (160 lb)    Protein (gm/kg) 0.8    0.8 Gm Protein (gm) 58.06    Estimated Protein Range 58-73    Estimated/Assessed Fluid Needs    Fluid Need Method RDA method    RDA Method (mL)  1900            Nutrition Prescription Ordered       01/20/18 1221    Nutrition Prescription PO    Current PO Diet Regular            Evaluation of Received Nutrient/Fluid Intake       01/20/18 1221    PO Evaluation    Number of Days PO Intake Evaluated Insufficient Data            Electronically signed by:  Vickie Sheehan RD  01/20/18 12:22 PM  "

## 2018-01-20 NOTE — PROGRESS NOTES
[facility] Medicine Services  INPATIENT PROGRESS NOTE    Length of Stay: 1  Date of Admission: 1/19/2018  Primary Care Physician: Maribel Barclay MD    Subjective   Chief Complaint: shortness of air  HPI:    Reports he is feeling much better than yesterday but still having some trouble breathing and coughing.     Review of Systems   Constitutional: Negative for activity change.   Respiratory: Positive for cough and shortness of breath.         All pertinent negatives and positives are as above. All other systems have been reviewed and are negative unless otherwise stated.     Objective    Temp:  [97.7 °F (36.5 °C)-98.6 °F (37 °C)] 97.8 °F (36.6 °C)  Heart Rate:  [] 78  Resp:  [20-24] 20  BP: (113-142)/(65-88) 131/65  Physical Exam   Constitutional: He appears well-developed and well-nourished. No distress.   HENT:   Head: Normocephalic and atraumatic.   Cardiovascular: Normal rate.    Pulmonary/Chest: Effort normal. No respiratory distress. He has decreased breath sounds.   Abdominal: Soft. He exhibits no distension. There is no tenderness.   Musculoskeletal: Normal range of motion. He exhibits no edema.   Neurological: He is alert. No cranial nerve deficit.   Skin: Skin is warm and dry. He is not diaphoretic.   Psychiatric: He has a normal mood and affect. His behavior is normal. Judgment and thought content normal.   Nursing note and vitals reviewed.          Results Review:  I have reviewed the labs, radiology results, and diagnostic studies.    Laboratory Data:   Lab Results (last 24 hours)     Procedure Component Value Units Date/Time    Influenza Antigen, Rapid - Swab, Nasopharynx [[id number]]  (Normal) Collected:  01/19/18 1535    Specimen:  Swab from Nasopharynx Updated:  01/19/18 1602     Influenza A Ag, EIA Negative     Influenza B Ag, EIA Negative    Blood Gas, Arterial [[id number]]  (Abnormal) Collected:  01/19/18 1608    Specimen:  Arterial Blood  Updated:  01/19/18 1620     Site --      Not performed at this site.        Elbert's Test --      Not performed at this site.        pH, Arterial 7.445 pH units      pCO2, Arterial 44.9 mm Hg      pO2, Arterial 67.3 (L) mm Hg      HCO3, Arterial 30.2 (H) mmol/L      Base Excess, Arterial 5.3 (H) mmol/L      O2 Saturation, Arterial 94.5 %      Hemoglobin, Blood Gas 14.9 g/dL      Hematocrit, Blood Gas 44.0 %      CO2 Content 31.5 (H)     Sodium, Arterial 135.8 (L) mmol/L      Potassium, Arterial 3.92 mmol/L      Glucose, Arterial 117 mmol/L      Barometric Pressure for Blood Gas -- mmHg       Not performed at this site.        Modality --      Not performed at this site.        Ionized Calcium 4.70 mg/dL     CBC & Differential [[id number]] Collected:  01/19/18 1608    Specimen:  Blood Updated:  01/19/18 1652    Narrative:       The following orders were created for panel order CBC & Differential.  Procedure                               Abnormality         Status                     ---------                               -----------         ------                     CBC Auto Differential[[id number]]        Abnormal            Final result                 Please view results for these tests on the individual orders.    CBC Auto Differential [[id number]]  (Abnormal) Collected:  01/19/18 1608    Specimen:  Blood Updated:  01/19/18 1652     WBC 16.06 (H) 10*3/mm3      RBC 4.75 10*6/mm3      Hemoglobin 14.1 g/dL      Hematocrit 42.4 %      MCV 89.3 fL      MCH 29.7 pg      MCHC 33.3 g/dL      RDW 13.3 %      RDW-SD 43.4 fl      MPV 10.8 fL      Platelets 241 10*3/mm3      Neutrophil % 88.4 (H) %      Lymphocyte % 4.7 (L) %      Monocyte % 6.4 %      Eosinophil % 0.0 %      Basophil % 0.0 %      Immature Grans % 0.5 %      Neutrophils, Absolute 14.20 (H) 10*3/mm3      Lymphocytes, Absolute 0.76 10*3/mm3      Monocytes, Absolute 1.02 (H) 10*3/mm3      Eosinophils, Absolute 0.00 10*3/mm3      Basophils, Absolute 0.00 10*3/mm3       Immature Grans, Absolute 0.08 (H) 10*3/mm3     Comprehensive Metabolic Panel [[id number]]  (Abnormal) Collected:  01/19/18 1608    Specimen:  Blood Updated:  01/19/18 1654     Glucose 107 (H) mg/dL      BUN 14 mg/dL      Creatinine 0.71 mg/dL      Sodium 134 (L) mmol/L      Potassium 4.3 mmol/L      Chloride 94 (L) mmol/L      CO2 31.0 mmol/L      Calcium 9.8 mg/dL      Total Protein 6.3 g/dL      Albumin 3.70 g/dL      ALT (SGPT) 38 U/L      AST (SGOT) 22 U/L      Alkaline Phosphatase 70 U/L      Total Bilirubin 0.5 mg/dL      eGFR Non African Amer 112 mL/min/1.73      Globulin 2.6 gm/dL      A/G Ratio 1.4 g/dL      BUN/Creatinine Ratio 19.7     Anion Gap 9.0 mmol/L     Protime-INR [[id number]]  (Normal) Collected:  01/19/18 1608    Specimen:  Blood Updated:  01/19/18 1703     Protime 12.1 Seconds      INR 0.91    Narrative:       Therapeutic range for most indications is 2.0-3.0 INR,  or 2.5-3.5 for mechanical heart valves.    Extra Tubes [[id number]] Collected:  01/19/18 1608    Specimen:  Blood from Blood, Venous Line Updated:  01/19/18 1716    Narrative:       The following orders were created for panel order Extra Tubes.  Procedure                               Abnormality         Status                     ---------                               -----------         ------                     Green Top (Gel)[[id number]]                                  Final result                 Please view results for these tests on the individual orders.    Green Top (Gel) [[id number]] Collected:  01/19/18 1608    Specimen:  Blood Updated:  01/19/18 1716     Extra Tube Hold for add-ons.      Auto resulted.       Urinalysis With / Culture If Indicated - Urine, Clean Catch [[id number]]  (Abnormal) Collected:  01/19/18 1732    Specimen:  Urine from Urine, Clean Catch Updated:  01/19/18 1838     Color, UA Yellow     Appearance, UA Turbid (A)     pH, UA 7.0     Specific Gravity, UA 1.017     Glucose, UA Negative     Ketones, UA  Negative     Bilirubin, UA Negative     Blood, UA Negative     Protein, UA Negative     Leuk Esterase, UA Negative     Nitrite, UA Negative     Urobilinogen, UA 1.0 E.U./dL    Narrative:       Urine microscopic not indicated.    CBC & Differential [[id number]] Collected:  01/20/18 0617    Specimen:  Blood Updated:  01/20/18 0658    Narrative:       The following orders were created for panel order CBC & Differential.  Procedure                               Abnormality         Status                     ---------                               -----------         ------                     CBC Auto Differential[[id number]]        Abnormal            Final result                 Please view results for these tests on the individual orders.    CBC Auto Differential [[id number]]  (Abnormal) Collected:  01/20/18 0617    Specimen:  Blood Updated:  01/20/18 0658     WBC 10.62 (H) 10*3/mm3      RBC 4.37 10*6/mm3      Hemoglobin 13.0 (L) g/dL      Hematocrit 39.1 %      MCV 89.5 fL      MCH 29.7 pg      MCHC 33.2 g/dL      RDW 13.2 %      RDW-SD 43.3 fl      MPV 10.7 fL      Platelets 221 10*3/mm3      Neutrophil % 92.4 (H) %      Lymphocyte % 3.2 (L) %      Monocyte % 3.8 %      Eosinophil % 0.0 %      Basophil % 0.0 %      Immature Grans % 0.6 (H) %      Neutrophils, Absolute 9.82 (H) 10*3/mm3      Lymphocytes, Absolute 0.34 (L) 10*3/mm3      Monocytes, Absolute 0.40 10*3/mm3      Eosinophils, Absolute 0.00 10*3/mm3      Basophils, Absolute 0.00 10*3/mm3      Immature Grans, Absolute 0.06 (H) 10*3/mm3     Basic Metabolic Panel [[id number]]  (Abnormal) Collected:  01/20/18 0617    Specimen:  Blood Updated:  01/20/18 0734     Glucose 164 (H) mg/dL      BUN 21 mg/dL      Creatinine 0.70 mg/dL      Sodium 135 (L) mmol/L      Potassium 4.0 mmol/L      Chloride 97 mmol/L      CO2 30.0 mmol/L      Calcium 9.5 mg/dL      eGFR Non African Amer 114 (H) mL/min/1.73      BUN/Creatinine Ratio 30.0 (H)     Anion Gap 8.0 mmol/L      Respiratory Culture - Sputum, Cough [[id number]] Collected:  01/19/18 1732    Specimen:  Sputum from Cough Updated:  01/20/18 0747     Respiratory Culture Culture in progress     Gram Stain Result Many (4+) WBCs per low power field      Few (2+) Epithelial cells per low power field      Mixed bacterial ashia          Culture Data:   No results found for: BLOODCX  No results found for: URINECX  Respiratory Culture   Date Value Ref Range Status   01/19/2018 Culture in progress  Preliminary     No results found for: WOUNDCX  No results found for: STOOLCX  No components found for: BODYFLD    Radiology Data:   Imaging Results (last 24 hours)     Procedure Component Value Units Date/Time    XR Chest PA & Lateral [[id number]] Collected:  01/19/18 1649     Updated:  01/19/18 1704    Narrative:       Chest x-ray PA and lateral     CLINICAL INDICATION: Shortness of breath. COPD exacerbation    COMPARISON: Chest x-ray March 10, 2015    FINDINGS: Cardiac silhouette is normal in size. Pulmonary  vascularity is unremarkable.     Flattening both diaphragms and increase in the retrosternal  airspace indicating air trapping, COPD. Emphysematous changes  especially in both upper lobes. Numerous benign calcified  granulomata throughout both lung fields.      Impression:       CONCLUSION: Flattening both diaphragms and increase in the  retrosternal airspace indicating air trapping, COPD.  Emphysematous changes especially both upper lobes. Numerous  benign calcified granulomata in both lung fields without  significant changes since prior exam. No evidence of active  disease.    Electronically signed by:  Eyal Hou MD  1/19/2018 5:03 PM CST  Workstation: [id number]          I have reviewed the patient current medications.     Assessment/Plan     Hospital Problem List     COPD (chronic obstructive pulmonary disease)        Acute COPD exacerbation - supplemental oxygen , solumedrol, nebulizer treatments  Tobacco abuse - nicotine patch  GI  prophylaxis - pepcid  DVT prophylaxis - lovenox              Jarrodlayne Ordoñez MD   01/20/18   11:24 AM

## 2018-01-20 NOTE — PLAN OF CARE
Problem: Patient Care Overview (Adult)  Goal: Plan of Care Review  Outcome: Ongoing (interventions implemented as appropriate)   01/20/18 3367   Coping/Psychosocial Response Interventions   Plan Of Care Reviewed With patient   Patient Care Overview   Progress improving   Outcome Evaluation   Outcome Summary/Follow up Plan pt states he feels like he is improving. vss. lung sounds still coarse.       Problem: Respiratory Insufficiency (Adult)  Goal: Acid/Base Balance  Outcome: Ongoing (interventions implemented as appropriate)    Goal: Effective Ventilation  Outcome: Ongoing (interventions implemented as appropriate)      Problem: Fall Risk (Adult)  Goal: Identify Related Risk Factors and Signs and Symptoms  Outcome: Outcome(s) achieved Date Met: 01/20/18    Goal: Absence of Falls  Outcome: Ongoing (interventions implemented as appropriate)

## 2018-01-21 LAB
ANION GAP SERPL CALCULATED.3IONS-SCNC: 11 MMOL/L (ref 5–15)
BACTERIA SPEC RESP CULT: NORMAL
BACTERIA SPEC RESP CULT: NORMAL
BASOPHILS # BLD AUTO: 0.01 10*3/MM3 (ref 0–0.2)
BASOPHILS NFR BLD AUTO: 0.1 % (ref 0–2)
BUN BLD-MCNC: 18 MG/DL (ref 7–21)
BUN/CREAT SERPL: 25.4 (ref 7–25)
CALCIUM SPEC-SCNC: 9.3 MG/DL (ref 8.4–10.2)
CHLORIDE SERPL-SCNC: 97 MMOL/L (ref 95–110)
CO2 SERPL-SCNC: 31 MMOL/L (ref 22–31)
CREAT BLD-MCNC: 0.71 MG/DL (ref 0.7–1.3)
DEPRECATED RDW RBC AUTO: 43.6 FL (ref 35.1–43.9)
EOSINOPHIL # BLD AUTO: 0 10*3/MM3 (ref 0–0.7)
EOSINOPHIL NFR BLD AUTO: 0 % (ref 0–7)
ERYTHROCYTE [DISTWIDTH] IN BLOOD BY AUTOMATED COUNT: 13.3 % (ref 11.5–14.5)
GFR SERPL CREATININE-BSD FRML MDRD: 112 ML/MIN/1.73 (ref 60–113)
GLUCOSE BLD-MCNC: 197 MG/DL (ref 60–100)
GRAM STN SPEC: NORMAL
HCT VFR BLD AUTO: 38.4 % (ref 39–49)
HGB BLD-MCNC: 12.7 G/DL (ref 13.7–17.3)
IMM GRANULOCYTES # BLD: 0.06 10*3/MM3 (ref 0–0.02)
IMM GRANULOCYTES NFR BLD: 0.5 % (ref 0–0.5)
LYMPHOCYTES # BLD AUTO: 0.37 10*3/MM3 (ref 0.6–4.2)
LYMPHOCYTES NFR BLD AUTO: 3.2 % (ref 10–50)
MCH RBC QN AUTO: 29.7 PG (ref 26.5–34)
MCHC RBC AUTO-ENTMCNC: 33.1 G/DL (ref 31.5–36.3)
MCV RBC AUTO: 89.7 FL (ref 80–98)
MONOCYTES # BLD AUTO: 0.46 10*3/MM3 (ref 0–0.9)
MONOCYTES NFR BLD AUTO: 4 % (ref 0–12)
NEUTROPHILS # BLD AUTO: 10.73 10*3/MM3 (ref 2–8.6)
NEUTROPHILS NFR BLD AUTO: 92.2 % (ref 37–80)
PLATELET # BLD AUTO: 253 10*3/MM3 (ref 150–450)
PMV BLD AUTO: 10.7 FL (ref 8–12)
POTASSIUM BLD-SCNC: 4 MMOL/L (ref 3.5–5.1)
RBC # BLD AUTO: 4.28 10*6/MM3 (ref 4.37–5.74)
SODIUM BLD-SCNC: 139 MMOL/L (ref 137–145)
WBC NRBC COR # BLD: 11.63 10*3/MM3 (ref 3.2–9.8)

## 2018-01-21 PROCEDURE — 25010000002 METHYLPREDNISOLONE PER 125 MG: Performed by: INTERNAL MEDICINE

## 2018-01-21 PROCEDURE — 80048 BASIC METABOLIC PNL TOTAL CA: CPT | Performed by: INTERNAL MEDICINE

## 2018-01-21 PROCEDURE — 94760 N-INVAS EAR/PLS OXIMETRY 1: CPT

## 2018-01-21 PROCEDURE — 25010000002 METHYLPREDNISOLONE PER 125 MG: Performed by: FAMILY MEDICINE

## 2018-01-21 PROCEDURE — 25010000002 LEVOFLOXACIN PER 250 MG: Performed by: INTERNAL MEDICINE

## 2018-01-21 PROCEDURE — 85025 COMPLETE CBC W/AUTO DIFF WBC: CPT | Performed by: INTERNAL MEDICINE

## 2018-01-21 PROCEDURE — 94799 UNLISTED PULMONARY SVC/PX: CPT

## 2018-01-21 PROCEDURE — 25010000002 ENOXAPARIN PER 10 MG: Performed by: INTERNAL MEDICINE

## 2018-01-21 RX ORDER — METHYLPREDNISOLONE SODIUM SUCCINATE 125 MG/2ML
60 INJECTION, POWDER, LYOPHILIZED, FOR SOLUTION INTRAMUSCULAR; INTRAVENOUS EVERY 12 HOURS
Status: DISCONTINUED | OUTPATIENT
Start: 2018-01-21 | End: 2018-01-22

## 2018-01-21 RX ADMIN — METHYLPREDNISOLONE SODIUM SUCCINATE 60 MG: 125 INJECTION, POWDER, FOR SOLUTION INTRAMUSCULAR; INTRAVENOUS at 16:55

## 2018-01-21 RX ADMIN — MONTELUKAST 10 MG: 10 TABLET, FILM COATED ORAL at 21:35

## 2018-01-21 RX ADMIN — LEVOFLOXACIN 750 MG: 5 INJECTION, SOLUTION INTRAVENOUS at 16:56

## 2018-01-21 RX ADMIN — IPRATROPIUM BROMIDE AND ALBUTEROL SULFATE 3 ML: 2.5; .5 SOLUTION RESPIRATORY (INHALATION) at 19:59

## 2018-01-21 RX ADMIN — IPRATROPIUM BROMIDE AND ALBUTEROL SULFATE 3 ML: 2.5; .5 SOLUTION RESPIRATORY (INHALATION) at 14:09

## 2018-01-21 RX ADMIN — BUDESONIDE AND FORMOTEROL FUMARATE DIHYDRATE 2 PUFF: 160; 4.5 AEROSOL RESPIRATORY (INHALATION) at 07:10

## 2018-01-21 RX ADMIN — ENOXAPARIN SODIUM 40 MG: 40 INJECTION SUBCUTANEOUS at 21:34

## 2018-01-21 RX ADMIN — METHYLPREDNISOLONE SODIUM SUCCINATE 60 MG: 125 INJECTION, POWDER, FOR SOLUTION INTRAMUSCULAR; INTRAVENOUS at 05:49

## 2018-01-21 RX ADMIN — FAMOTIDINE 20 MG: 10 INJECTION, SOLUTION INTRAVENOUS at 08:01

## 2018-01-21 RX ADMIN — BUDESONIDE AND FORMOTEROL FUMARATE DIHYDRATE 2 PUFF: 160; 4.5 AEROSOL RESPIRATORY (INHALATION) at 19:59

## 2018-01-21 RX ADMIN — IPRATROPIUM BROMIDE AND ALBUTEROL SULFATE 3 ML: 2.5; .5 SOLUTION RESPIRATORY (INHALATION) at 07:10

## 2018-01-21 RX ADMIN — IPRATROPIUM BROMIDE AND ALBUTEROL SULFATE 3 ML: 2.5; .5 SOLUTION RESPIRATORY (INHALATION) at 00:46

## 2018-01-21 RX ADMIN — NICOTINE 1 PATCH: 7 PATCH, EXTENDED RELEASE TRANSDERMAL at 15:31

## 2018-01-21 RX ADMIN — FAMOTIDINE 20 MG: 10 INJECTION, SOLUTION INTRAVENOUS at 21:34

## 2018-01-21 NOTE — PLAN OF CARE
Problem: Patient Care Overview (Adult)  Goal: Plan of Care Review  Outcome: Ongoing (interventions implemented as appropriate)   01/21/18 1333   Coping/Psychosocial Response Interventions   Plan Of Care Reviewed With patient   Patient Care Overview   Progress improving   Outcome Evaluation   Outcome Summary/Follow up Plan vss. lung sounds improving. solumedrol increased.       Problem: Respiratory Insufficiency (Adult)  Goal: Acid/Base Balance  Outcome: Ongoing (interventions implemented as appropriate)    Goal: Effective Ventilation  Outcome: Ongoing (interventions implemented as appropriate)

## 2018-01-21 NOTE — PROGRESS NOTES
[facility] Medicine Services  INPATIENT PROGRESS NOTE    Length of Stay: 2  Date of Admission: 1/19/2018  Primary Care Physician: Maribel Barclay MD    Subjective   Chief Complaint: shortness of air  HPI:    Feels much better , breathing is improving but still having productive cough.  Has not ambulated much    Review of Systems   Constitutional: Negative for activity change.   Respiratory: Positive for cough. Negative for shortness of breath.    Gastrointestinal: Negative for diarrhea.        All pertinent negatives and positives are as above. All other systems have been reviewed and are negative unless otherwise stated.     Objective    Temp:  [97 °F (36.1 °C)-98 °F (36.7 °C)] 97 °F (36.1 °C)  Heart Rate:  [73-92] 86  Resp:  [18-22] 21  BP: (115-132)/(61-71) 115/61  Physical Exam   Constitutional: He appears well-developed and well-nourished. No distress.   HENT:   Head: Normocephalic and atraumatic.   Cardiovascular: Normal rate.    Pulmonary/Chest: Effort normal. No respiratory distress. He has wheezes.   Abdominal: Soft. He exhibits no distension. There is no tenderness.   Neurological: He is alert.   Skin: Skin is warm. He is not diaphoretic.   Psychiatric: He has a normal mood and affect. His behavior is normal. Judgment and thought content normal.   Vitals reviewed.          Results Review:  I have reviewed the labs, radiology results, and diagnostic studies.    Laboratory Data:   Lab Results (last 24 hours)     Procedure Component Value Units Date/Time    CBC & Differential [[id number]] Collected:  01/21/18 0531    Specimen:  Blood Updated:  01/21/18 0610    Narrative:       The following orders were created for panel order CBC & Differential.  Procedure                               Abnormality         Status                     ---------                               -----------         ------                     CBC Auto Differential[[id number]]         Abnormal            Final result                 Please view results for these tests on the individual orders.    CBC Auto Differential [[id number]]  (Abnormal) Collected:  01/21/18 0531    Specimen:  Blood Updated:  01/21/18 0610     WBC 11.63 (H) 10*3/mm3      RBC 4.28 (L) 10*6/mm3      Hemoglobin 12.7 (L) g/dL      Hematocrit 38.4 (L) %      MCV 89.7 fL      MCH 29.7 pg      MCHC 33.1 g/dL      RDW 13.3 %      RDW-SD 43.6 fl      MPV 10.7 fL      Platelets 253 10*3/mm3      Neutrophil % 92.2 (H) %      Lymphocyte % 3.2 (L) %      Monocyte % 4.0 %      Eosinophil % 0.0 %      Basophil % 0.1 %      Immature Grans % 0.5 %      Neutrophils, Absolute 10.73 (H) 10*3/mm3      Lymphocytes, Absolute 0.37 (L) 10*3/mm3      Monocytes, Absolute 0.46 10*3/mm3      Eosinophils, Absolute 0.00 10*3/mm3      Basophils, Absolute 0.01 10*3/mm3      Immature Grans, Absolute 0.06 (H) 10*3/mm3     Basic Metabolic Panel [[id number]]  (Abnormal) Collected:  01/21/18 0531    Specimen:  Blood Updated:  01/21/18 0633     Glucose 197 (H) mg/dL      BUN 18 mg/dL      Creatinine 0.71 mg/dL      Sodium 139 mmol/L      Potassium 4.0 mmol/L      Chloride 97 mmol/L      CO2 31.0 mmol/L      Calcium 9.3 mg/dL      eGFR Non African Amer 112 mL/min/1.73      BUN/Creatinine Ratio 25.4 (H)     Anion Gap 11.0 mmol/L     Respiratory Culture - Sputum, Cough [[id number]] Collected:  01/19/18 1732    Specimen:  Sputum from Cough Updated:  01/21/18 0832     Respiratory Culture --      Normal Respiratory Ashia     Gram Stain Result Many (4+) WBCs per low power field      Few (2+) Epithelial cells per low power field      Mixed bacterial ashia          Culture Data:   No results found for: BLOODCX  No results found for: URINECX  Respiratory Culture   Date Value Ref Range Status   01/19/2018 Normal Respiratory Ashia  Final     No results found for: WOUNDCX  No results found for: STOOLCX  No components found for: BODYFLD    Radiology Data:   Imaging Results (last  24 hours)     ** No results found for the last 24 hours. **          I have reviewed the patient current medications.     Assessment/Plan     Hospital Problem List     COPD (chronic obstructive pulmonary disease)          Acute copd exacerbation - feels better but still wheezing.  Will continue solumedrol nebulizer treatments, levaquin  Tobacco abuse - nicotine patch  GI prophylaxis - pepcid  DVT prophylaxis - lovenox          Jarrod Tera Ordoñez MD   01/21/18   9:31 AM

## 2018-01-22 LAB
ANION GAP SERPL CALCULATED.3IONS-SCNC: 9 MMOL/L (ref 5–15)
BASOPHILS # BLD AUTO: 0 10*3/MM3 (ref 0–0.2)
BASOPHILS NFR BLD AUTO: 0 % (ref 0–2)
BUN BLD-MCNC: 21 MG/DL (ref 7–21)
BUN/CREAT SERPL: 35 (ref 7–25)
CALCIUM SPEC-SCNC: 9.3 MG/DL (ref 8.4–10.2)
CHLORIDE SERPL-SCNC: 99 MMOL/L (ref 95–110)
CO2 SERPL-SCNC: 29 MMOL/L (ref 22–31)
CREAT BLD-MCNC: 0.6 MG/DL (ref 0.7–1.3)
DEPRECATED RDW RBC AUTO: 43.3 FL (ref 35.1–43.9)
EOSINOPHIL # BLD AUTO: 0 10*3/MM3 (ref 0–0.7)
EOSINOPHIL NFR BLD AUTO: 0 % (ref 0–7)
ERYTHROCYTE [DISTWIDTH] IN BLOOD BY AUTOMATED COUNT: 13.2 % (ref 11.5–14.5)
GFR SERPL CREATININE-BSD FRML MDRD: 136 ML/MIN/1.73 (ref 49–113)
GLUCOSE BLD-MCNC: 104 MG/DL (ref 60–100)
HCT VFR BLD AUTO: 39.1 % (ref 39–49)
HGB BLD-MCNC: 12.8 G/DL (ref 13.7–17.3)
IMM GRANULOCYTES # BLD: 0.11 10*3/MM3 (ref 0–0.02)
IMM GRANULOCYTES NFR BLD: 0.9 % (ref 0–0.5)
LYMPHOCYTES # BLD AUTO: 0.5 10*3/MM3 (ref 0.6–4.2)
LYMPHOCYTES NFR BLD AUTO: 3.9 % (ref 10–50)
MCH RBC QN AUTO: 29.3 PG (ref 26.5–34)
MCHC RBC AUTO-ENTMCNC: 32.7 G/DL (ref 31.5–36.3)
MCV RBC AUTO: 89.5 FL (ref 80–98)
MONOCYTES # BLD AUTO: 1.37 10*3/MM3 (ref 0–0.9)
MONOCYTES NFR BLD AUTO: 10.6 % (ref 0–12)
NEUTROPHILS # BLD AUTO: 10.92 10*3/MM3 (ref 2–8.6)
NEUTROPHILS NFR BLD AUTO: 84.6 % (ref 37–80)
PLATELET # BLD AUTO: 226 10*3/MM3 (ref 150–450)
PMV BLD AUTO: 11.1 FL (ref 8–12)
POTASSIUM BLD-SCNC: 4 MMOL/L (ref 3.5–5.1)
RBC # BLD AUTO: 4.37 10*6/MM3 (ref 4.37–5.74)
SODIUM BLD-SCNC: 137 MMOL/L (ref 137–145)
WBC NRBC COR # BLD: 12.9 10*3/MM3 (ref 3.2–9.8)

## 2018-01-22 PROCEDURE — 25010000002 METHYLPREDNISOLONE PER 125 MG: Performed by: FAMILY MEDICINE

## 2018-01-22 PROCEDURE — 80048 BASIC METABOLIC PNL TOTAL CA: CPT | Performed by: INTERNAL MEDICINE

## 2018-01-22 PROCEDURE — G8978 MOBILITY CURRENT STATUS: HCPCS | Performed by: PHYSICAL THERAPIST

## 2018-01-22 PROCEDURE — G8979 MOBILITY GOAL STATUS: HCPCS | Performed by: PHYSICAL THERAPIST

## 2018-01-22 PROCEDURE — 94799 UNLISTED PULMONARY SVC/PX: CPT

## 2018-01-22 PROCEDURE — 25010000002 LEVOFLOXACIN PER 250 MG: Performed by: INTERNAL MEDICINE

## 2018-01-22 PROCEDURE — 94760 N-INVAS EAR/PLS OXIMETRY 1: CPT

## 2018-01-22 PROCEDURE — 63710000001 PREDNISONE PER 1 MG: Performed by: FAMILY MEDICINE

## 2018-01-22 PROCEDURE — 25010000002 ENOXAPARIN PER 10 MG: Performed by: INTERNAL MEDICINE

## 2018-01-22 PROCEDURE — 85025 COMPLETE CBC W/AUTO DIFF WBC: CPT | Performed by: INTERNAL MEDICINE

## 2018-01-22 RX ORDER — PREDNISONE 20 MG/1
40 TABLET ORAL
Status: DISCONTINUED | OUTPATIENT
Start: 2018-01-22 | End: 2018-01-23 | Stop reason: HOSPADM

## 2018-01-22 RX ADMIN — BUDESONIDE AND FORMOTEROL FUMARATE DIHYDRATE 2 PUFF: 160; 4.5 AEROSOL RESPIRATORY (INHALATION) at 07:26

## 2018-01-22 RX ADMIN — TRAZODONE HYDROCHLORIDE 50 MG: 50 TABLET ORAL at 21:14

## 2018-01-22 RX ADMIN — TRAZODONE HYDROCHLORIDE 50 MG: 50 TABLET ORAL at 04:35

## 2018-01-22 RX ADMIN — IPRATROPIUM BROMIDE AND ALBUTEROL SULFATE 3 ML: 2.5; .5 SOLUTION RESPIRATORY (INHALATION) at 07:26

## 2018-01-22 RX ADMIN — NICOTINE 1 PATCH: 7 PATCH, EXTENDED RELEASE TRANSDERMAL at 17:05

## 2018-01-22 RX ADMIN — LEVOFLOXACIN 750 MG: 5 INJECTION, SOLUTION INTRAVENOUS at 17:51

## 2018-01-22 RX ADMIN — FAMOTIDINE 20 MG: 10 INJECTION, SOLUTION INTRAVENOUS at 09:25

## 2018-01-22 RX ADMIN — PREDNISONE 40 MG: 20 TABLET ORAL at 12:23

## 2018-01-22 RX ADMIN — IPRATROPIUM BROMIDE AND ALBUTEROL SULFATE 3 ML: 2.5; .5 SOLUTION RESPIRATORY (INHALATION) at 01:46

## 2018-01-22 RX ADMIN — FAMOTIDINE 20 MG: 10 INJECTION, SOLUTION INTRAVENOUS at 20:43

## 2018-01-22 RX ADMIN — METHYLPREDNISOLONE SODIUM SUCCINATE 60 MG: 125 INJECTION, POWDER, FOR SOLUTION INTRAMUSCULAR; INTRAVENOUS at 05:26

## 2018-01-22 RX ADMIN — ENOXAPARIN SODIUM 40 MG: 40 INJECTION SUBCUTANEOUS at 20:43

## 2018-01-22 RX ADMIN — IPRATROPIUM BROMIDE AND ALBUTEROL SULFATE 3 ML: 2.5; .5 SOLUTION RESPIRATORY (INHALATION) at 19:19

## 2018-01-22 RX ADMIN — MONTELUKAST 10 MG: 10 TABLET, FILM COATED ORAL at 20:43

## 2018-01-22 RX ADMIN — IPRATROPIUM BROMIDE AND ALBUTEROL SULFATE 3 ML: 2.5; .5 SOLUTION RESPIRATORY (INHALATION) at 12:29

## 2018-01-22 RX ADMIN — BUDESONIDE AND FORMOTEROL FUMARATE DIHYDRATE 2 PUFF: 160; 4.5 AEROSOL RESPIRATORY (INHALATION) at 19:25

## 2018-01-22 NOTE — PROGRESS NOTES
[facility] Medicine Services  INPATIENT PROGRESS NOTE    Length of Stay: 3  Date of Admission: 1/19/2018  Primary Care Physician: Maribel Barclay MD    Subjective   No chief complaint on file.    Shortness of air.     HPI: 62-year-old male with a history of O2 dependent COPD and nicotine dependence who was brought in as a direct admission from the office of Dr. Lee secondary to acute exacerbation of COPD.  Patient has had progressively worsening shortness of breath associated with a cough productive of green sputum within the past 1-2 weeks.  Patient has been seen and [facility] emergency Department 4 days ago and was given outpatient treatment.  Despite the outpatient treatment including antimicrobial patient continued to remain symptomatically.    1/22/2018: Patient is doing much better, he is back to his baseline oxygen at home.  However he still has significant productive cough of yellowish green sputum.    Review of Systems   Constitutional: Positive for activity change and appetite change. Negative for chills and fever.   HENT: Negative for nosebleeds and rhinorrhea.    Respiratory: Positive for cough, shortness of breath and wheezing. Negative for chest tightness.    Cardiovascular: Negative for chest pain, palpitations and leg swelling.   Gastrointestinal: Negative for constipation, diarrhea, nausea and vomiting.   Genitourinary: Negative for dysuria and frequency.   Musculoskeletal: Negative for back pain.   Neurological: Negative for dizziness.   Hematological: Negative for adenopathy.   Psychiatric/Behavioral: Negative for confusion.        All pertinent negatives and positives are as above. All other systems have been reviewed and are negative unless otherwise stated.     Objective      Vital Sign Min/Max for last 24 hours  Temp  Min: 97 °F (36.1 °C)  Max: 98 °F (36.7 °C)   BP  Min: 117/62  Max: 139/83   Pulse  Min: 78  Max: 100   Resp   Min: 18  Max: 20   SpO2  Min: 92 %  Max: 96 %   Flow (L/min)  Min: 2  Max: 2   No Data Recorded         Physical Exam   Constitutional: He is oriented to person, place, and time. He appears well-developed.   HENT:   Head: Normocephalic.   Eyes: EOM are normal. Pupils are equal, round, and reactive to light.   Neck: Normal range of motion.   Cardiovascular: Normal rate, regular rhythm and normal heart sounds.    Pulmonary/Chest: Effort normal. He has decreased breath sounds in the right middle field, the right lower field, the left middle field and the left lower field. He has wheezes. He has rales.   Abdominal: Soft. Bowel sounds are normal.   Musculoskeletal: Normal range of motion.   Neurological: He is alert and oriented to person, place, and time.   Vitals reviewed.      Current Facility-Administered Medications   Medication Dose Route Frequency Provider Last Rate Last Dose   • acetaminophen (TYLENOL) tablet 650 mg  650 mg Oral Q6H PRN Michael Paul MD       • benzonatate (TESSALON) capsule 100 mg  100 mg Oral TID PRN Michael Paul MD       • bisacodyl (DULCOLAX) EC tablet 10 mg  10 mg Oral Daily PRN Michael Paul MD       • budesonide-formoterol (SYMBICORT) 160-4.5 MCG/ACT inhaler 2 puff  2 puff Inhalation BID - RT Michael Paul MD   2 puff at 01/22/18 0726   • enoxaparin (LOVENOX) syringe 40 mg  40 mg Subcutaneous Q24H Michael Paul MD   40 mg at 01/21/18 2134   • famotidine (PEPCID) injection 20 mg  20 mg Intravenous Q12H Jarrod Ordoñez MD   20 mg at 01/22/18 0925   • ipratropium-albuterol (DUO-NEB) nebulizer solution 3 mL  3 mL Nebulization Q6H - RT Michael Paul MD   3 mL at 01/22/18 1229   • levoFLOXacin (LEVAQUIN) 750 mg/150 mL D5W (premix) (LEVAQUIN) 750 mg  750 mg Intravenous Q24H Michael Paul MD   750 mg at 01/22/18 1751   • montelukast (SINGULAIR) tablet 10 mg  10 mg Oral Nightly Michael Paul MD   10 mg at 01/21/18 2137   • nicotine (NICODERM CQ) 7 MG/24HR  patch 1 patch  1 patch Transdermal Q24H Michael Paul MD   1 patch at 01/22/18 1705   • predniSONE (DELTASONE) tablet 40 mg  40 mg Oral Daily With Breakfast Onur Paulino MD   40 mg at 01/22/18 1223   • traZODone (DESYREL) tablet 50 mg  50 mg Oral Nightly PRN Michael Paul MD   50 mg at 01/22/18 0435           Results Review:  I have reviewed the labs, radiology results, and diagnostic studies.    Laboratory Data:     Results from last 7 days  Lab Units 01/22/18  0731 01/21/18  0531 01/20/18  0617 01/19/18  1608   SODIUM mmol/L 137 139 135* 134*   SODIUM, ARTERIAL mmol/L  --   --   --  135.8*   POTASSIUM mmol/L 4.0 4.0 4.0 4.3   CHLORIDE mmol/L 99 97 97 94*   CO2 mmol/L 29.0 31.0 30.0 31.0   BUN mg/dL 21 18 21 14   CREATININE mg/dL 0.60* 0.71 0.70 0.71   GLUCOSE mg/dL 104* 197* 164* 107*   GLUCOSE, ARTERIAL mmol/L  --   --   --  117   CALCIUM mg/dL 9.3 9.3 9.5 9.8   BILIRUBIN mg/dL  --   --   --  0.5   ALK PHOS U/L  --   --   --  70   ALT (SGPT) U/L  --   --   --  38   AST (SGOT) U/L  --   --   --  22   ANION GAP mmol/L 9.0 11.0 8.0 9.0     Estimated Creatinine Clearance: 102.2 mL/min (by C-G formula based on Cr of 0.6).            Results from last 7 days  Lab Units 01/22/18  0541 01/21/18  0531 01/20/18  0617 01/19/18  1608   WBC 10*3/mm3 12.90* 11.63* 10.62* 16.06*   HEMOGLOBIN g/dL 12.8* 12.7* 13.0* 14.1   HEMATOCRIT % 39.1 38.4* 39.1 42.4   PLATELETS 10*3/mm3 226 253 221 241       Results from last 7 days  Lab Units 01/19/18  1608   INR  0.91           Culture Data:   No results found for: BLOODCX  No results found for: URINECX  No results found for: RESPCX  No results found for: WOUNDCX  No results found for: STOOLCX  No components found for: BODYFLD      Radiology Data:   Imaging Results (last 24 hours)     ** No results found for the last 24 hours. **          I have reviewed the patient current medications.     Assessment/Plan     Active Hospital Problems (** Indicates Principal Problem)     Diagnosis Date Noted   • COPD (chronic obstructive pulmonary disease) [J44.9] 01/20/2018   • Chronic respiratory failure with hypercapnia [J96.12] 12/13/2017   • Tobacco dependence syndrome [F17.200] 12/07/2016      Resolved Hospital Problems    Diagnosis Date Noted Date Resolved   No resolved problems to display.     1. Acute COPD exacerbation: feeling much better. Will has mild shortness of air and wheezing. Continue with neb treatment and levaquin.   2. Tobacco abuse: nicotine patch. Counseling provided.   3. GI ppx: pepcid.   4. DVT: PPX lovenox.     Discharge Planning: I expect patient to be discharged to home in 1-2 days.      DVT Prophylaxis: lovenox.               This document has been electronically signed by Onur Paulino MD on January 22, 2018 5:59 PM

## 2018-01-22 NOTE — PLAN OF CARE
Problem: Patient Care Overview (Adult)  Goal: Plan of Care Review  Outcome: Ongoing (interventions implemented as appropriate)   01/22/18 0930   Coping/Psychosocial Response Interventions   Plan Of Care Reviewed With patient   Outcome Evaluation   Outcome Summary/Follow up Plan Pt is a 61 y/o male admitted due to COPD exacerbation. He requires supervision for sit to stand transfers and CGA during gait with a single point cane. He requires cueing to remind him to slow down and take deep breaths at times. During today's session pt's HR reached 128 bpm after walking about 40 feet and he was very SOB, but his O2 sat remained at around 90% with gait. PT will continue to see this patient to work on gaining endurance for gait and ADLs. Pt should be able to return to home with assist once d/c from hospital.     Goal: Discharge Needs Assessment  Outcome: Ongoing (interventions implemented as appropriate)   01/22/18 0930   Self-Care   Equipment Currently Used at Home oxygen;walker, rolling;cane, straight  (pt has hospital bed but does not use)   Discharge Needs Assessment   Equipment Needed After Discharge other (see comments)  (PT recommends pt gets new tip for his cane for safety. )   Current Discharge Risk chronically ill   Discharge Disposition still a patient   Living Environment   Transportation Available car       Problem: Inpatient Physical Therapy  Goal: Gait Training Goal LTG- PT  Outcome: Ongoing (interventions implemented as appropriate)   01/22/18 0930   Gait Training PT LTG   Gait Training Goal PT LTG, Date Established 01/22/18   Gait Training Goal PT LTG, Time to Achieve by discharge   Gait Training Goal PT LTG, [place] Level conditional independence   Gait Training Goal PT LTG, Assist Device cane, straight   Gait Training Goal PT LTG, Distance to Achieve 150 feet   Gait Training Goal PT LTG, Additional Goal Pt's O2 sat will remain at or above 90% with 2L O2 NC and HR at or below 120 with gait     Goal:  Stair Training Goal LTG- PT  Outcome: Ongoing (interventions implemented as appropriate)   01/22/18 0930   Stair Training PT LTG   Stair Training Goal PT LTG, Date Established 01/22/18   Stair Training Goal PT LTG, Time to Achieve by discharge   Stair Training Goal PT LTG, Number of Steps 1   Stair Training Goal PT LTG, [place] Level conditional independence   Stair Training Goal PT LTG, Assist Device cane, straight

## 2018-01-22 NOTE — PAYOR COMM NOTE
"Kermit Mckeon (62 y.o. Male)     Date of Birth Social Security Number Address Home Phone MRN    [id number]  [id number]   [place] [address] [phone] [id number]    [facility] Marital Status          None        Admission Date Admission Type Admitting Provider Attending Provider Department, Room/Bed    1/19/18 Urgent Humberto, MD Alona Schwartz Tudor, MD [address], 376/1    Discharge Date Discharge Disposition Discharge Destination                      Attending Provider: Tru Sage MD     Allergies:  No Known Allergies    Isolation:  None   Infection:  None   Code Status:  FULL    Ht:  175.3 cm (69\")   Wt:  58.1 kg (128 lb)    Admission Cmt:  None   Principal Problem:  None                Active Insurance as of 1/19/2018     Primary Coverage     Payor Plan Insurance Group Employer/Plan Group    ANTHEM MEDICAID AN[place] MEDICAID [id number]     Payor Plan Address Payor Plan Phone Number Effective From Effective To    [address] [address] [phone] 10/1/2016     [place] [address]       Subscriber Name Subscriber Birth Date Member ID       KERMIT MCKEON 1955 [id number]                 Emergency Contacts      (Rel.) Home Phone Work Phone Mobile Phone    Neris Troncoso (Daughter) [phone] -- --               History & Physical      Michael Paul MD at 1/19/2018  3:47 PM                [facility] Medicine Services  INPATIENT HISTORY AND PHYSICAL       Patient Care Team:  Maribel Barclay MD as PCP - General (Family Medicine)    Chief complaint   Shortness of breath and cough    Subjective     Patient is a 62 y.o. male with history of O2 dependent COPD and nicotine dependence who was brought in as a direct admission from the office of Dr. Lee secondary to acute exacerbation of COPD.  Patient has had progressively worsening shortness of air associated with cough " productive of greenish sputum within the past 1-2 weeks.  He was seen at the [facility] emergency Department 4 days ago and commenced on outpatient treatment.  Despite the outpatient treatment including antimicrobials, patient continues to remain symptomatic.  He denies fever, chills, nausea, vomiting, chest pain, hemoptysis, syncope or presyncope.  He however does admit to weakness and diminished oral intake.     Family and social history: Patient lives alone but not far from close relatives.  He does use a cane to assist him with ambulation.  He has been smoking an average of half a pack of cigarettes a day for close to 50 years.  Denies history of alcohol use.  There is family history of hypertension and heart disease.     Review of Systems   Review of Systems   Constitutional: Positive for activity change and fatigue. Negative for appetite change, chills, diaphoresis and fever.   HENT: Negative for trouble swallowing and voice change.    Eyes: Negative for photophobia and visual disturbance.   Respiratory: Positive for cough, shortness of breath and wheezing. Negative for choking, chest tightness and stridor.    Cardiovascular: Negative for chest pain, palpitations and leg swelling.   Gastrointestinal: Negative for abdominal distention, abdominal pain, blood in stool, constipation, diarrhea, nausea and vomiting.   Endocrine: Negative for cold intolerance, heat intolerance, polydipsia, polyphagia and polyuria.   Genitourinary: Negative for decreased urine volume, difficulty urinating, dysuria, enuresis, flank pain, frequency, hematuria and urgency.   Musculoskeletal: Positive for gait problem. Negative for arthralgias, myalgias, neck pain and neck stiffness.   Skin: Negative for pallor, rash and wound.   Neurological: Positive for tremors. Negative for dizziness, seizures, syncope, facial asymmetry, speech difficulty, weakness, light-headedness, numbness and headaches.   Hematological: Does not  bruise/bleed easily.   Psychiatric/Behavioral: Negative for agitation, behavioral problems and confusion.       History  Past Medical History:   Diagnosis Date   • Acute bronchitis    • Acute exacerbation of chronic obstructive airways disease    • Chronic bronchitis    • Chronic obstructive lung disease    • Hypoxemia    • IgE deficiency     mediated allergic asthma   • Inguinal hernia     left side   • Lymphadenopathy     left groin     Past Surgical History:   Procedure Laterality Date   • HERNIA REPAIR      with mesh   • INJECTION OF MEDICATION      Celestone  (2)   • INJECTION OF MEDICATION      Depo Medrol  (2)     Family History   Problem Relation Age of Onset   • Heart disease Other    • Other Other      respiratory disorder   • Heart disease Mother    • Heart disease Father    • COPD Father    • No Known Problems Sister    • No Known Problems Brother    • No Known Problems Maternal Grandmother    • No Known Problems Maternal Grandfather    • No Known Problems Paternal Grandmother    • No Known Problems Paternal Grandfather      Social History   Substance Use Topics   • Smoking status: Light Tobacco Smoker     Packs/day: 2.00     Years: 30.00     Types: Cigarettes   • Smokeless tobacco: Current User      Comment: 2 ppd for 30 years   • Alcohol use No     Facility-Administered Medications Prior to Admission   Medication Dose Route Frequency Provider Last Rate Last Dose   • ipratropium-albuterol (DUO-NEB) nebulizer solution 3 mL  3 mL Nebulization Once Maribel Barclay MD         Prescriptions Prior to Admission   Medication Sig Dispense Refill Last Dose   • albuterol (PROVENTIL HFA;VENTOLIN HFA) 108 (90 BASE) MCG/ACT inhaler Inhale 2 puffs Every 6 (Six) Hours As Needed for Wheezing or Shortness of Air. 1 inhaler 11 Taking   • albuterol (PROVENTIL) (2.5 MG/3ML) 0.083% nebulizer solution USE ONE VIAL IN NEBULIZER EVERY 4 HOURS AS NEEDED FOR WHEEZING 180 vial 1 Taking   • benzonatate (TESSALON PERLES) 100 MG  capsule Take 1 capsule by mouth 3 (Three) Times a Day As Needed for Cough. 30 capsule 0 Taking   • doxycycline (MONODOX) 100 MG capsule 1 dose by mouth twice a day 20 capsule 0 Taking   • doxycycline (MONODOX) 100 MG capsule 1 dose by mouth twice a day 20 capsule 0 Taking   • ipratropium (ATROVENT) 0.02 % nebulizer solution FOUR TIMES DAILY   Taking   • IPRATROPIUM-ALBUTEROL IN Inhale 3 mL every 4 (four) hours as needed.   Taking   • montelukast (SINGULAIR) 10 MG tablet TAKE ONE TABLET BY MOUTH EVERY NIGHT 30 tablet 5 Taking   • nicotine (NICODERM CQ) 7 MG/24HR patch DAILY   Taking   • predniSONE (DELTASONE) 10 MG tablet 2 tablets by mouth daily for 1 week, then 1 tablet daily for 1 week 21 tablet 0 Taking   • predniSONE (DELTASONE) 20 MG tablet Take 1 tablet by mouth Daily. 30 tablet 0 Taking   • Respiratory Therapy Supplies (NEBULIZER) device As needed for inhalation prn   Taking   • tiotropium (SPIRIVA HANDIHALER) 18 MCG per inhalation capsule Place 1 capsule into inhaler and inhale Daily. 30 capsule 5 Taking   • traZODone (DESYREL) 50 MG tablet    Taking     Allergies:  Review of patient's allergies indicates no known allergies.  Prior to Admission medications    Medication Sig Start Date End Date Taking? Authorizing Provider   albuterol (PROVENTIL HFA;VENTOLIN HFA) 108 (90 BASE) MCG/ACT inhaler Inhale 2 puffs Every 6 (Six) Hours As Needed for Wheezing or Shortness of Air. 6/2/17   Maribel Barclay MD   albuterol (PROVENTIL) (2.5 MG/3ML) 0.083% nebulizer solution USE ONE VIAL IN NEBULIZER EVERY 4 HOURS AS NEEDED FOR WHEEZING 9/22/17   Maribel Barclay MD   benzonatate (TESSALON PERLES) 100 MG capsule Take 1 capsule by mouth 3 (Three) Times a Day As Needed for Cough. 12/4/17   Maribel Barclay MD   doxycycline (MONODOX) 100 MG capsule 1 dose by mouth twice a day 12/13/17   Aba Lee MD   doxycycline (MONODOX) 100 MG capsule 1 dose by mouth twice a day 12/29/17   Aba Lee MD   ipratropium  (ATROVENT) 0.02 % nebulizer solution FOUR TIMES DAILY 8/16/17   Historical Provider, MD   IPRATROPIUM-ALBUTEROL IN Inhale 3 mL every 4 (four) hours as needed.    Historical Provider, MD   montelukast (SINGULAIR) 10 MG tablet TAKE ONE TABLET BY MOUTH EVERY NIGHT 12/27/17   Maribel Barclay MD   nicotine (NICODERM CQ) 7 MG/24HR patch DAILY 11/22/17   Historical Provider, MD   predniSONE (DELTASONE) 10 MG tablet 2 tablets by mouth daily for 1 week, then 1 tablet daily for 1 week 12/13/17   Aba Lee MD   predniSONE (DELTASONE) 20 MG tablet Take 1 tablet by mouth Daily. 12/29/17   Aba Lee MD   Respiratory Therapy Supplies (NEBULIZER) device As needed for inhalation prn    Historical Provider, MD   tiotropium (SPIRIVA HANDIHALER) 18 MCG per inhalation capsule Place 1 capsule into inhaler and inhale Daily. 6/20/17   Maribel Barclay MD   traZODone (DESYREL) 50 MG tablet  12/18/17   Historical Provider, MD       Objective     Vital Signs    Temp:  [98.6 °F (37 °C)] 98.6 °F (37 °C)  Heart Rate:  [113-123] 113  Resp:  [24] 24  BP: (140-142)/(87-88) 142/87    Physical Exam:      Physical Exam   Constitutional: He is oriented to person, place, and time. He appears well-developed and well-nourished. He appears distressed.   HENT:   Head: Normocephalic and atraumatic.   Eyes: EOM are normal. Pupils are equal, round, and reactive to light. No scleral icterus.   Neck: Normal range of motion. Neck supple. No JVD present. No thyromegaly present.   Cardiovascular: Regular rhythm and normal heart sounds.  Tachycardia present.  Exam reveals no gallop and no friction rub.    No murmur heard.  Pulmonary/Chest: Effort normal. He has decreased breath sounds. He has wheezes. He has rhonchi. He has no rales. He exhibits no tenderness.   He has profound distant breath sounds bilaterally.   Abdominal: Soft. Bowel sounds are normal. He exhibits no distension and no mass. There is no tenderness. There is no rebound and no  guarding.   Musculoskeletal: He exhibits no edema, tenderness or deformity.   Neurological: He is alert and oriented to person, place, and time. He displays tremor. No cranial nerve deficit. He exhibits normal muscle tone. Coordination normal.   Skin: Skin is warm and dry. No rash noted. He is not diaphoretic. No erythema. No pallor.   Psychiatric: He has a normal mood and affect. His behavior is normal. Judgment and thought content normal.   Nursing note and vitals reviewed.      Results Review:   Pending        Invalid input(s): LABALBU, PROT                    Imaging Results (last 7 days)     ** No results found for the last 168 hours. **          Assessment/Plan   1. Exacerbation of COPD: Patient will be admitted, continued on supplemental oxygen and started on bronchodilators, steroids and empiric antimicrobial therapy.  Sputum will be sent for respiratory culture and patient will have an influenza screen.  Check Chest x-ray, routine labs to include compressive metabolic panel, CBC, ABG and urinalysis.   2. History of nicotine dependence: Continued nicotine patch.  Patient has been advised on the need to quit smoking.  3. Begin GI and DVT prophylaxis.  4.  Further treatment plans or diagnostic studies as hospital course dictates.               I discussed the patients findings and my recommendations with patient and his daughter-in-law.     Michael Paul MD  01/19/18  3:48 PM        Total Time Spent: 55 minutes    EMR Dragon/Transcription disclaimer:   Much of this encounter note is an electronic transcription/translation of spoken language to printed text. The electronic translation of spoken language may permit erroneous, or at times, nonsensical words or phrases to be inadvertently transcribed; Although I have reviewed the note for such errors, some may still exist.                 Electronically signed by Michael Paul MD at 1/19/2018  3:59 PM        Emergency Department Notes     No notes of  this type exist for this encounter.        Hospital Medications (active)       Dose Frequency Start End    acetaminophen (TYLENOL) tablet 650 mg 650 mg Every 6 Hours PRN 1/19/2018     Sig - Route: Take 2 tablets by mouth Every 6 (Six) Hours As Needed for Mild Pain . - Oral    benzonatate (TESSALON) capsule 100 mg 100 mg 3 Times Daily PRN 1/19/2018     Sig - Route: Take 1 capsule by mouth 3 (Three) Times a Day As Needed for Cough. - Oral    bisacodyl (DULCOLAX) EC tablet 10 mg 10 mg Daily PRN 1/19/2018     Sig - Route: Take 2 tablets by mouth Daily As Needed for Constipation. - Oral    budesonide-formoterol (SYMBICORT) 160-4.5 MCG/ACT inhaler 2 puff 2 puff 2 Times Daily - RT 1/19/2018     Sig - Route: Inhale 2 puffs 2 (Two) Times a Day. - Inhalation    enoxaparin (LOVENOX) syringe 40 mg 40 mg Every 24 Hours 1/19/2018     Sig - Route: Inject 0.4 mL under the skin Daily. - Subcutaneous    famotidine (PEPCID) injection 20 mg 20 mg Every 12 Hours Scheduled 1/20/2018     Sig - Route: Infuse 2 mL into a venous catheter Every 12 (Twelve) Hours. - Intravenous    ipratropium-albuterol (DUO-NEB) nebulizer solution 3 mL 3 mL Every 6 Hours - RT 1/19/2018     Sig - Route: Take 3 mL by nebulization Every 6 (Six) Hours. - Nebulization    levoFLOXacin (LEVAQUIN) 750 mg/150 mL D5W (premix) (LEVAQUIN) 750 mg 750 mg Every 24 Hours 1/19/2018 1/26/2018    Sig - Route: Infuse 150 mL into a venous catheter Daily. - Intravenous    montelukast (SINGULAIR) tablet 10 mg 10 mg Nightly 1/19/2018     Sig - Route: Take 1 tablet by mouth Every Night. - Oral    nicotine (NICODERM CQ) 7 MG/24HR patch 1 patch 1 patch Every 24 Hours 1/19/2018     Sig - Route: Place 1 patch on the skin Daily. - Transdermal    predniSONE (DELTASONE) tablet 40 mg 40 mg Daily With Breakfast 1/22/2018     Sig - Route: Take 2 tablets by mouth Daily With Breakfast. - Oral    traZODone (DESYREL) tablet 50 mg 50 mg Nightly PRN 1/19/2018     Sig - Route: Take 1 tablet by mouth  At Night As Needed for Sleep. - Oral    methylPREDNISolone sodium succinate (SOLU-Medrol) injection 60 mg (Discontinued) 60 mg Every 12 Hours 1/21/2018 1/22/2018    Sig - Route: Infuse 0.96 mL into a venous catheter Every 12 (Twelve) Hours. - Intravenous            Lab Results (last 24 hours)     Procedure Component Value Units Date/Time    CBC & Differential [[id number]] Collected:  01/22/18 0541    Specimen:  Blood Updated:  01/22/18 0611    Narrative:       The following orders were created for panel order CBC & Differential.  Procedure                               Abnormality         Status                     ---------                               -----------         ------                     CBC Auto Differential[[id number]]        Abnormal            Final result                 Please view results for these tests on the individual orders.    CBC Auto Differential [[id number]]  (Abnormal) Collected:  01/22/18 0541    Specimen:  Blood Updated:  01/22/18 0611     WBC 12.90 (H) 10*3/mm3      RBC 4.37 10*6/mm3      Hemoglobin 12.8 (L) g/dL      Hematocrit 39.1 %      MCV 89.5 fL      MCH 29.3 pg      MCHC 32.7 g/dL      RDW 13.2 %      RDW-SD 43.3 fl      MPV 11.1 fL      Platelets 226 10*3/mm3      Neutrophil % 84.6 (H) %      Lymphocyte % 3.9 (L) %      Monocyte % 10.6 %      Eosinophil % 0.0 %      Basophil % 0.0 %      Immature Grans % 0.9 (H) %      Neutrophils, Absolute 10.92 (H) 10*3/mm3      Lymphocytes, Absolute 0.50 (L) 10*3/mm3      Monocytes, Absolute 1.37 (H) 10*3/mm3      Eosinophils, Absolute 0.00 10*3/mm3      Basophils, Absolute 0.00 10*3/mm3      Immature Grans, Absolute 0.11 (H) 10*3/mm3     Basic Metabolic Panel [[id number]]  (Abnormal) Collected:  01/22/18 0731    Specimen:  Blood Updated:  01/22/18 0808     Glucose 104 (H) mg/dL      BUN 21 mg/dL      Creatinine 0.60 (L) mg/dL      Sodium 137 mmol/L      Potassium 4.0 mmol/L      Chloride 99 mmol/L      CO2 29.0 mmol/L      Calcium 9.3 mg/dL       eGFR Non African Amer 136 (H) mL/min/1.73      BUN/Creatinine Ratio 35.0 (H)     Anion Gap 9.0 mmol/L         Imaging Results (last 24 hours)     ** No results found for the last 24 hours. **           Physician Progress Notes (most recent note)      Jarrod Ordoñez MD at 1/21/2018  9:31 AM  Version 1 of 1             [facility] Medicine Services  INPATIENT PROGRESS NOTE    Length of Stay: 2  Date of Admission: 1/19/2018  Primary Care Physician: Maribel Barclay MD    Subjective   Chief Complaint: shortness of air  HPI:    Feels much better , breathing is improving but still having productive cough.  Has not ambulated much    Review of Systems   Constitutional: Negative for activity change.   Respiratory: Positive for cough. Negative for shortness of breath.    Gastrointestinal: Negative for diarrhea.        All pertinent negatives and positives are as above. All other systems have been reviewed and are negative unless otherwise stated.     Objective    Temp:  [97 °F (36.1 °C)-98 °F (36.7 °C)] 97 °F (36.1 °C)  Heart Rate:  [73-92] 86  Resp:  [18-22] 21  BP: (115-132)/(61-71) 115/61  Physical Exam   Constitutional: He appears well-developed and well-nourished. No distress.   HENT:   Head: Normocephalic and atraumatic.   Cardiovascular: Normal rate.    Pulmonary/Chest: Effort normal. No respiratory distress. He has wheezes.   Abdominal: Soft. He exhibits no distension. There is no tenderness.   Neurological: He is alert.   Skin: Skin is warm. He is not diaphoretic.   Psychiatric: He has a normal mood and affect. His behavior is normal. Judgment and thought content normal.   Vitals reviewed.          Results Review:  I have reviewed the labs, radiology results, and diagnostic studies.    Laboratory Data:   Lab Results (last 24 hours)     Procedure Component Value Units Date/Time    CBC & Differential [[id number]] Collected:  01/21/18 0531    Specimen:  Blood Updated:   01/21/18 0610    Narrative:       The following orders were created for panel order CBC & Differential.  Procedure                               Abnormality         Status                     ---------                               -----------         ------                     CBC Auto Differential[[id number]]        Abnormal            Final result                 Please view results for these tests on the individual orders.    CBC Auto Differential [[id number]]  (Abnormal) Collected:  01/21/18 0531    Specimen:  Blood Updated:  01/21/18 0610     WBC 11.63 (H) 10*3/mm3      RBC 4.28 (L) 10*6/mm3      Hemoglobin 12.7 (L) g/dL      Hematocrit 38.4 (L) %      MCV 89.7 fL      MCH 29.7 pg      MCHC 33.1 g/dL      RDW 13.3 %      RDW-SD 43.6 fl      MPV 10.7 fL      Platelets 253 10*3/mm3      Neutrophil % 92.2 (H) %      Lymphocyte % 3.2 (L) %      Monocyte % 4.0 %      Eosinophil % 0.0 %      Basophil % 0.1 %      Immature Grans % 0.5 %      Neutrophils, Absolute 10.73 (H) 10*3/mm3      Lymphocytes, Absolute 0.37 (L) 10*3/mm3      Monocytes, Absolute 0.46 10*3/mm3      Eosinophils, Absolute 0.00 10*3/mm3      Basophils, Absolute 0.01 10*3/mm3      Immature Grans, Absolute 0.06 (H) 10*3/mm3     Basic Metabolic Panel [[id number]]  (Abnormal) Collected:  01/21/18 0531    Specimen:  Blood Updated:  01/21/18 0633     Glucose 197 (H) mg/dL      BUN 18 mg/dL      Creatinine 0.71 mg/dL      Sodium 139 mmol/L      Potassium 4.0 mmol/L      Chloride 97 mmol/L      CO2 31.0 mmol/L      Calcium 9.3 mg/dL      eGFR Non African Amer 112 mL/min/1.73      BUN/Creatinine Ratio 25.4 (H)     Anion Gap 11.0 mmol/L     Respiratory Culture - Sputum, Cough [[id number]] Collected:  01/19/18 0414    Specimen:  Sputum from Cough Updated:  01/21/18 0832     Respiratory Culture --      Normal Respiratory Ashia     Gram Stain Result Many (4+) WBCs per low power field      Few (2+) Epithelial cells per low power field      Mixed bacterial ashia           Culture Data:   No results found for: BLOODCX  No results found for: URINECX  Respiratory Culture   Date Value Ref Range Status   01/19/2018 Normal Respiratory Jasmyne  Final     No results found for: WOUNDCX  No results found for: STOOLCX  No components found for: BODYFLD    Radiology Data:   Imaging Results (last 24 hours)     ** No results found for the last 24 hours. **          I have reviewed the patient current medications.     Assessment/Plan     Hospital Problem List     COPD (chronic obstructive pulmonary disease)          Acute copd exacerbation - feels better but still wheezing.  Will continue solumedrol nebulizer treatments, levaquin  Tobacco abuse - nicotine patch  GI prophylaxis - pepcid  DVT prophylaxis - lovenox          Jarrod Ordoñez MD   01/21/18   9:31 AM       Electronically signed by Jarrod Ordoñez MD at 1/21/2018  9:35 AM        Consult Notes (most recent note)     No notes of this type exist for this encounter.          Initial request for inpatient admission  Pending ref # [id number]  Isi Mendoza RN,   [phone] phone  [phone] fax

## 2018-01-22 NOTE — THERAPY EVALUATION
Acute Care - Physical Therapy Initial Evaluation  [facility]     Patient Name: Kermit Corley  : 1955  MRN: [id number]  Today's Date: 2018   Onset of Illness/Injury or Date of Surgery Date: 18  Date of Referral to PT: 18  Referring Physician: Dr. Ordoñez      Admit Date: 2018     Visit Dx:  No diagnosis found.  Patient Active Problem List   Diagnosis   • Lung disease   • Tobacco dependence syndrome   • COPD exacerbation   • Insomnia   • Bilateral edema of lower extremity   • Recurrent spontaneous pneumothorax   • COPD, severe   • Asthma   • Chest pain   • Chronic bronchitis with COPD (chronic obstructive pulmonary disease)   • Chronic respiratory failure with hypercapnia   • Edema   • Encounter for chest tube placement   • Hemoptysis   • Hernia of abdominal cavity   • Hypomagnesemia   • Pneumonia   • Pneumothorax, right   • Sepsis   • Respiratory failure   • Tobacco abuse   • Pulmonary nodule   • COPD (chronic obstructive pulmonary disease)     Past Medical History:   Diagnosis Date   • Acute bronchitis    • Acute exacerbation of chronic obstructive airways disease    • Chronic bronchitis    • Chronic obstructive lung disease    • Hypoxemia    • IgE deficiency     mediated allergic asthma   • Inguinal hernia     left side   • Lymphadenopathy     left groin     Past Surgical History:   Procedure Laterality Date   • HERNIA REPAIR      with mesh   • INJECTION OF MEDICATION      Celestone  (2)   • INJECTION OF MEDICATION      Depo Medrol  (2)          PT ASSESSMENT (last 72 hours)      PT Evaluation       18 0930 18 1439    Rehab Evaluation    Document Type evaluation  -CB (r) HL (t) CB (c)     Subjective Information agree to therapy;complains of;pain   SOB  -CB (r) HL (t) CB (c)     Patient Effort, Rehab Treatment good  -CB (r) HL (t) CB (c)     Symptoms Noted During/After Treatment shortness of breath  -CB (r) HL (t) CB (c)     Symptoms Noted Comment HR increased to  128 after walking 40' with SP cane and 2L O2.  O2 sat remained at 90%  -CB (r) HL (t) CB (c)     General Information    Patient Profile Review yes  -CB (r) HL (t) CB (c)     Onset of Illness/Injury or Date of Surgery Date 01/19/18  -CB     Referring Physician Dr. Ordoñez  -CB (r) HL (t) CB (c)     General Observations pt sitting EOB with telemetry, Iv, O2 at @l NC  -CB (r) HL (t) CB (c)     Pertinent History Of Current Problem pt admitted d/t COPD exacerbation  -CB (r) HL (t) CB (c)     Precautions/Limitations fall precautions;oxygen therapy device and L/min  -CB (r) HL (t) CB (c)     Prior Level of Function independent:;gait;ADL's   used SP cane at home  -CB (r) HL (t) CB (c)     Equipment Currently Used at Home oxygen;walker, rolling;cane, straight   pt has hospital bed but does not use  -CB (r) HL (t) CB (c) cane, quad;cane, straight;walker, rolling;oxygen   community oxygen  -EW    Plans/Goals Discussed With patient  -CB (r) HL (t) CB (c)     Risks Reviewed patient:;change in vital signs  -CB (r) HL (t) CB (c)     Benefits Reviewed patient:;improve function  -CB (r) HL (t) CB (c)     Living Environment    Lives With alone   pt stated he has neighbors & family to help while he recover  -CB (r) HL (t) CB (c) alone  -EW    Living Arrangements mobile home  -CB (r) HL (t) CB (c) house  -EW    Home Accessibility ramps present at home;stairs to enter home   may not be an identified ramp but walkway inclines upward to  -CB     Number of Stairs to Enter Home 1  -CB (r) HL (t) CB (c)     Stair Railings at Home none  -CB (r) HL (t) CB (c)     Transportation Available car  -CB (r) HL (t) CB (c) car  -EW    Clinical Impression    Date of Referral to PT 01/21/18  -CB (r) HL (t) CB (c)     PT Diagnosis impaired physical mobility d/t COPD, SOB  -CB (r) HL (t) CB (c)     Prognosis fair  -CB (r) HL (t) CB (c)     Criteria for Skilled Therapeutic Interventions Met yes;treatment indicated  -CB (r) HL (t) CB (c)      Pathology/Pathophysiology Noted (Describe Specifically for Each System) pulmonary;musculoskeletal  -CB (r) HL (t) CB (c)     Impairments Found (describe specific impairments) aerobic capacity/endurance;gait, locomotion, and balance;muscle performance  -CB (r) HL (t) CB (c)     Functional Limitations in Following Categories (Describe Specific Limitations) self-care  -CB (r) HL (t) CB (c)     Rehab Potential good, to achieve stated therapy goals  -CB (r) HL (t) CB (c)     Predicted Duration of Therapy Intervention (days/wks) until d/c or goals met  -CB (r) HL (t) CB (c)     Vital Signs    Pre Systolic BP Rehab 137  -CB (r) HL (t) CB (c)     Pre Treatment Diastolic BP 69  -CB (r) HL (t) CB (c)     Post Systolic BP Rehab 144  -CB (r) HL (t) CB (c)     Post Treatment Diastolic BP 75  -CB (r) HL (t) CB (c)     Pretreatment Heart Rate (beats/min) 84  -CB (r) HL (t) CB (c)     Intratreatment Heart Rate (beats/min) 128  -CB (r) HL (t) CB (c)     Posttreatment Heart Rate (beats/min) 115  -CB (r) HL (t) CB (c)     Pre SpO2 (%) 96  -CB (r) HL (t) CB (c)     O2 Delivery Pre Treatment supplemental O2   2L  -CB (r) HL (t) CB (c)     Intra SpO2 (%) 90  -CB (r) HL (t) CB (c)     O2 Delivery Intra Treatment supplemental O2   2L  -CB (r) HL (t) CB (c)     Post SpO2 (%) 90  -CB (r) HL (t) CB (c)     O2 Delivery Post Treatment supplemental O2   2L  -CB (r) HL (t) CB (c)     Pre Patient Position Sitting  -CB (r) HL (t) CB (c)     Intra Patient Position Standing  -CB (r) HL (t) CB (c)     Post Patient Position Sitting  -CB (r) HL (t) CB (c)     Pain Assessment    Pain Assessment 0-10  -CB (r) HL (t) CB (c)     Pain Score 2  -CB (r) HL (t) CB (c)     Post Pain Score 2  -CB (r) HL (t) CB (c)     Pain Location Chest   from coughing  -CB (r) HL (t) CB (c)     Pain Intervention(s) Ambulation/increased activity  -CB (r) HL (t) CB (c)     Vision Assessment/Intervention    Visual Impairment WFL with corrective lenses  -CB (r) HL (t) CB (c)      Cognitive Assessment/Intervention    Current Cognitive/Communication Assessment functional  -CB (r) HL (t) CB (c)     Orientation Status oriented to;person;place;time  -CB (r) HL (t) CB (c)     Follows Commands/Answers Questions 100% of the time  -CB (r) HL (t) CB (c)     Personal Safety good awareness, safety precautions   pt needed cueing to slow down and take deep breaths w/ gait  -CB (r) HL (t) CB (c)     Personal Safety Interventions gait belt;fall prevention program maintained;nonskid shoes/slippers when out of bed  -CB (r) HL (t) CB (c)     ROM (Range of Motion)    General ROM no range of motion deficits identified  -CB (r) HL (t) CB (c)     MMT (Manual Muscle Testing)    General MMT Assessment no strength deficits identified  -CB (r) HL (t) CB (c)     General MMT Assessment Detail gross bilateral UE 4+/5, LE 4+/5  -CB (r) HL (t) CB (c)     Transfer Assessment/Treatment    Transfers, Sit-Stand [place] supervision required  -CB (r) HL (t) CB (c)     Transfers, Stand-Sit [place] supervision required  -CB (r) HL (t) CB (c)     Transfers, Sit-Stand-Sit, Assist Device straight cane  -CB     Gait Assessment/Treatment    Gait, [place] Level contact guard assist;verbal cues required   pt needed cue to slow down and take deep breaths  -CB (r) HL (t) CB (c)     Gait, Assistive Device straight cane  -CB (r) HL (t) CB (c)     Gait, Distance (Feet) 80  -CB (r) HL (t) CB (c)     Gait, Safety Issues supplemental O2  -CB (r) HL (t) CB (c)     Sensory Assessment/Intervention    Light Touch LUE;RUE;LLE;RLE  -CB (r) HL (t) CB (c)     LUE Light Touch WNL  -CB (r) HL (t) CB (c)     RUE Light Touch WNL  -CB (r) HL (t) CB (c)     LLE Light Touch WNL  -CB (r) HL (t) CB (c)     RLE Light Touch WNL  -CB (r) HL (t) CB (c)     Positioning and Restraints    Pre-Treatment Position sitting in chair/recliner  -CB (r) HL (t) CB (c)     Post Treatment Position bed  -CB (r) HL (t) CB (c)     In Bed sitting EOB;call light within  reach;encouraged to call for assist  -CB (r) HL (t) CB (c)       01/19/18 1800 01/19/18 8512    General Information    Equipment Currently Used at Home  cane, straight;oxygen  -AC    Living Environment    Lives With alone  -LR     Living Arrangements house  -LR     Home Accessibility no concerns  -LR     Stair Railings at Home none  -LR     Type of Financial/Environmental Concern none  -LR     Transportation Available none  -LR       User Key  (r) = Recorded By, (t) = Taken By, (c) = Cosigned By    Initials Name Provider Type    CB Estela Lazcano, PT Physical Therapist    AC Nancy Albarado, RN Registered Nurse    LR Heather Gilmore, RN Registered Nurse    EW Zuleyka Chen, W      Sayra Angelo, PT Student PT Student          Physical Therapy Education     Title: PT OT SLP Therapies (Active)     Topic: Physical Therapy (Active)     Point: Mobility training (Done)    Learning Progress Summary    Learner Readiness Method Response Comment Documented by Status   Patient Acceptance E [facility] 01/22/18 1020 Done               Point: Precautions (Done)    Learning Progress Summary    Learner Readiness Method Response Comment Documented by Status   Patient Acceptance E [facility] 01/22/18 1020 Done                      User Key     Initials Effective Dates Name Provider Type Discipline     12/05/17 -  Sayra Angelo, PT Student PT Student PT                PT Recommendation and Plan  Anticipated Equipment Needs At Discharge:  (pt needs to have the tip of his SP cane replaced.)  Anticipated Discharge Disposition: home with assist  Planned Therapy Interventions: balance training, bed mobility training, gait training, home exercise program, patient/family education, strengthening, stair training  PT Frequency: other (see comments) (5-14 visits)             IP PT Goals       01/22/18 0930          Gait Training PT LTG    Gait Training Goal PT LTG, Date Established 01/22/18  -CB (r) HL (t) CB (c)      Gait Training  Goal PT LTG, Time to Achieve by discharge  -CB (r) HL (t) CB (c)      Gait Training Goal PT LTG, [place] Level conditional independence  -CB (r) HL (t) CB (c)      Gait Training Goal PT LTG, Assist Device cane, straight  -CB (r) HL (t) CB (c)      Gait Training Goal PT LTG, Distance to Achieve 150 feet  -CB (r) HL (t) CB (c)      Gait Training Goal PT LTG, Additional Goal Pt's O2 sat will remain at or above 90% with 2L O2 NC and HR at or below 120 with gait  -CB (r) HL (t) CB (c)      Stair Training PT LTG    Stair Training Goal PT LTG, Date Established 01/22/18  -CB (r) HL (t) CB (c)      Stair Training Goal PT LTG, Time to Achieve by discharge  -CB (r) HL (t) CB (c)      Stair Training Goal PT LTG, Number of Steps 1  -CB (r) HL (t) CB (c)      Stair Training Goal PT LTG, [place] Level conditional independence  -CB (r) HL (t) CB (c)      Stair Training Goal PT LTG, Assist Device cane, straight  -CB (r) HL (t) CB (c)        User Key  (r) = Recorded By, (t) = Taken By, (c) = Cosigned By    Initials Name Provider Type    CB Estela Lazcnao, PT Physical Therapist    HL Sayra Angelo, PT Student PT Student                Outcome Measures       01/22/18 0930          How much help from another person do you currently need...    Turning from your back to your side while in flat bed without using bedrails? 3  -CB (r) HL (t) CB (c)      Moving from lying on back to sitting on the side of a flat bed without bedrails? 4  -CB (r) HL (t) CB (c)      Moving to and from a bed to a chair (including a wheelchair)? 4  -CB (r) HL (t) CB (c)      Standing up from a chair using your arms (e.g., wheelchair, bedside chair)? 4  -CB (r) HL (t) CB (c)      Climbing 3-5 steps with a railing? 3  -CB (r) HL (t) CB (c)      To walk in hospital room? 3  -CB (r) HL (t) CB (c)      AM-PAC 6 Clicks Score 21  -CB (r) HL (t)      Functional Assessment    Outcome Measure Options AM-PAC 6 Clicks Basic Mobility (PT)  -CB (r) HL (t) CB (c)         User Key  (r) = Recorded By, (t) = Taken By, (c) = Cosigned By    Initials Name Provider Type    CB Estela Lazcano, PT Physical Therapist    ELSIE Angelo, PT Student PT Student           Time Calculation:         PT Charges       01/22/18 1038          Time Calculation    Start Time 0930  -CB      Stop Time 0949  -CB      Time Calculation (min) 19 min  -CB      PT Received On 01/22/18  -CB      PT Goal Re-Cert Due Date 02/04/18  -CB        User Key  (r) = Recorded By, (t) = Taken By, (c) = Cosigned By    Initials Name Provider Type    CB Estela Lazcano, PT Physical Therapist          Therapy Charges for Today     Code Description Service Date Service Provider Modifiers Qty    61144792153 HC PT MOBILITY CURRENT 1/22/2018 Estela Lazcano, PT GP, CJ 1    [id number] HC PT MOBILITY PROJECTED 1/22/2018 Estela Lazcano, PT GP, CI 1          PT G-Codes  PT Professional Judgement Used?: Yes  Outcome Measure Options: AM-PAC 6 Clicks Basic Mobility (PT)  Score: 21  Functional Limitation: Mobility: Walking and moving around  Mobility: Walking and Moving Around Current Status (): At least 20 percent but less than 40 percent impaired, limited or restricted  Mobility: Walking and Moving Around Goal Status (): At least 1 percent but less than 20 percent impaired, limited or restricted      Estela Lazcano, PT  1/22/2018

## 2018-01-23 VITALS
OXYGEN SATURATION: 95 % | DIASTOLIC BLOOD PRESSURE: 63 MMHG | RESPIRATION RATE: 20 BRPM | HEART RATE: 90 BPM | TEMPERATURE: 97.2 F | WEIGHT: 128 LBS | SYSTOLIC BLOOD PRESSURE: 138 MMHG | BODY MASS INDEX: 18.96 KG/M2 | HEIGHT: 69 IN

## 2018-01-23 LAB
ANION GAP SERPL CALCULATED.3IONS-SCNC: 9 MMOL/L (ref 5–15)
BASOPHILS # BLD AUTO: 0.03 10*3/MM3 (ref 0–0.2)
BASOPHILS NFR BLD AUTO: 0.3 % (ref 0–2)
BUN BLD-MCNC: 21 MG/DL (ref 7–21)
BUN/CREAT SERPL: 27.6 (ref 7–25)
CALCIUM SPEC-SCNC: 9 MG/DL (ref 8.4–10.2)
CHLORIDE SERPL-SCNC: 97 MMOL/L (ref 95–110)
CO2 SERPL-SCNC: 34 MMOL/L (ref 22–31)
CREAT BLD-MCNC: 0.76 MG/DL (ref 0.7–1.3)
DEPRECATED RDW RBC AUTO: 44.9 FL (ref 35.1–43.9)
EOSINOPHIL # BLD AUTO: 0.01 10*3/MM3 (ref 0–0.7)
EOSINOPHIL NFR BLD AUTO: 0.1 % (ref 0–7)
ERYTHROCYTE [DISTWIDTH] IN BLOOD BY AUTOMATED COUNT: 13.6 % (ref 11.5–14.5)
GFR SERPL CREATININE-BSD FRML MDRD: 103 ML/MIN/1.73 (ref 60–113)
GLUCOSE BLD-MCNC: 91 MG/DL (ref 60–100)
HCT VFR BLD AUTO: 39.3 % (ref 39–49)
HGB BLD-MCNC: 13.2 G/DL (ref 13.7–17.3)
IMM GRANULOCYTES # BLD: 0.23 10*3/MM3 (ref 0–0.02)
IMM GRANULOCYTES NFR BLD: 2 % (ref 0–0.5)
LYMPHOCYTES # BLD AUTO: 0.84 10*3/MM3 (ref 0.6–4.2)
LYMPHOCYTES NFR BLD AUTO: 7.2 % (ref 10–50)
MCH RBC QN AUTO: 30.5 PG (ref 26.5–34)
MCHC RBC AUTO-ENTMCNC: 33.6 G/DL (ref 31.5–36.3)
MCV RBC AUTO: 90.8 FL (ref 80–98)
MONOCYTES # BLD AUTO: 1.73 10*3/MM3 (ref 0–0.9)
MONOCYTES NFR BLD AUTO: 14.9 % (ref 0–12)
NEUTROPHILS # BLD AUTO: 8.78 10*3/MM3 (ref 2–8.6)
NEUTROPHILS NFR BLD AUTO: 75.5 % (ref 37–80)
NRBC BLD MANUAL-RTO: 0 /100 WBC (ref 0–0)
PLATELET # BLD AUTO: 252 10*3/MM3 (ref 150–450)
PMV BLD AUTO: 10.6 FL (ref 8–12)
POTASSIUM BLD-SCNC: 4.2 MMOL/L (ref 3.5–5.1)
RBC # BLD AUTO: 4.33 10*6/MM3 (ref 4.37–5.74)
SODIUM BLD-SCNC: 140 MMOL/L (ref 137–145)
WBC NRBC COR # BLD: 11.62 10*3/MM3 (ref 3.2–9.8)

## 2018-01-23 PROCEDURE — 80048 BASIC METABOLIC PNL TOTAL CA: CPT | Performed by: INTERNAL MEDICINE

## 2018-01-23 PROCEDURE — 94799 UNLISTED PULMONARY SVC/PX: CPT

## 2018-01-23 PROCEDURE — 97530 THERAPEUTIC ACTIVITIES: CPT

## 2018-01-23 PROCEDURE — G8989 SELF CARE D/C STATUS: HCPCS

## 2018-01-23 PROCEDURE — 97166 OT EVAL MOD COMPLEX 45 MIN: CPT

## 2018-01-23 PROCEDURE — G8987 SELF CARE CURRENT STATUS: HCPCS

## 2018-01-23 PROCEDURE — 63710000001 PREDNISONE PER 1 MG: Performed by: FAMILY MEDICINE

## 2018-01-23 PROCEDURE — 85025 COMPLETE CBC W/AUTO DIFF WBC: CPT | Performed by: INTERNAL MEDICINE

## 2018-01-23 PROCEDURE — G8988 SELF CARE GOAL STATUS: HCPCS

## 2018-01-23 PROCEDURE — 94760 N-INVAS EAR/PLS OXIMETRY 1: CPT

## 2018-01-23 PROCEDURE — 97116 GAIT TRAINING THERAPY: CPT

## 2018-01-23 RX ORDER — LEVOFLOXACIN 500 MG/1
500 TABLET, FILM COATED ORAL DAILY
Qty: 2 TABLET | Refills: 0 | Status: SHIPPED | OUTPATIENT
Start: 2018-01-23 | End: 2018-01-30

## 2018-01-23 RX ORDER — PREDNISONE 10 MG/1
40 TABLET ORAL
Qty: 7 TABLET | Refills: 0 | Status: SHIPPED | OUTPATIENT
Start: 2018-01-24 | End: 2018-01-30

## 2018-01-23 RX ADMIN — PREDNISONE 40 MG: 20 TABLET ORAL at 08:10

## 2018-01-23 RX ADMIN — IPRATROPIUM BROMIDE AND ALBUTEROL SULFATE 3 ML: 2.5; .5 SOLUTION RESPIRATORY (INHALATION) at 06:58

## 2018-01-23 RX ADMIN — FAMOTIDINE 20 MG: 10 INJECTION, SOLUTION INTRAVENOUS at 08:10

## 2018-01-23 RX ADMIN — BUDESONIDE AND FORMOTEROL FUMARATE DIHYDRATE 2 PUFF: 160; 4.5 AEROSOL RESPIRATORY (INHALATION) at 07:03

## 2018-01-23 RX ADMIN — IPRATROPIUM BROMIDE AND ALBUTEROL SULFATE 3 ML: 2.5; .5 SOLUTION RESPIRATORY (INHALATION) at 00:39

## 2018-01-23 NOTE — PLAN OF CARE
Problem: Patient Care Overview (Adult)  Goal: Plan of Care Review  Outcome: Ongoing (interventions implemented as appropriate)   01/23/18 0424   Coping/Psychosocial Response Interventions   Plan Of Care Reviewed With patient   Patient Care Overview   Progress improving   Outcome Evaluation   Outcome Summary/Follow up Plan Pt rested well most of night.     Goal: Adult Individualization and Mutuality  Outcome: Ongoing (interventions implemented as appropriate)    Goal: Discharge Needs Assessment  Outcome: Ongoing (interventions implemented as appropriate)      Problem: Respiratory Insufficiency (Adult)  Goal: Acid/Base Balance  Outcome: Ongoing (interventions implemented as appropriate)    Goal: Effective Ventilation  Outcome: Ongoing (interventions implemented as appropriate)

## 2018-01-23 NOTE — PLAN OF CARE
Problem: Patient Care Overview (Adult)  Goal: Plan of Care Review  Outcome: Ongoing (interventions implemented as appropriate)   01/23/18 1051   Coping/Psychosocial Response Interventions   Plan Of Care Reviewed With patient   Patient Care Overview   Progress progress toward functional goals as expected   Outcome Evaluation   Outcome Summary/Follow up Plan pt responded well to therapy w/ increased gait to 148 ft with SBA-mod indep. pts vitals remained WFL. pt indep w/ curb steps training. pt met 1 new goal this tx. pt would benefit from  PT upon DC.      Goal: Discharge Needs Assessment  Outcome: Ongoing (interventions implemented as appropriate)   01/21/18 1439 01/22/18 0930 01/23/18 1000   Self-Care   Equipment Currently Used at Home --  --  oxygen;walker, rolling;cane, straight;hospital bed;shower chair   Discharge Needs Assessment   Concerns To Be Addressed denies needs/concerns at this time --  --    Equipment Needed After Discharge --  other (see comments)  (PT recommends pt gets new tip for his cane for safety. ) --    Discharge Facility/Level Of Care Needs --  --  --    Current Discharge Risk --  chronically ill --    Discharge Disposition --  still a patient --    Living Environment   Transportation Available --  --  car    01/23/18 1011   Self-Care   Equipment Currently Used at Home --    Discharge Needs Assessment   Concerns To Be Addressed --    Equipment Needed After Discharge --    Discharge Facility/Level Of Care Needs (intermittent care. )   Current Discharge Risk --    Discharge Disposition --    Living Environment   Transportation Available --        Problem: Inpatient Physical Therapy  Goal: Gait Training Goal LTG- PT  Outcome: Ongoing (interventions implemented as appropriate)   01/22/18 0930 01/23/18 1011   Gait Training PT LTG   Gait Training Goal PT LTG, Date Established 01/22/18 --    Gait Training Goal PT LTG, Time to Achieve by discharge --    Gait Training Goal PT LTG, [place] Level  conditional independence --    Gait Training Goal PT LTG, Assist Device cane, straight --    Gait Training Goal PT LTG, Distance to Achieve 150 feet --    Gait Training Goal PT LTG, Additional Goal Pt's O2 sat will remain at or above 90% with 2L O2 NC and HR at or below 120 with gait --    Gait Training Goal PT LTG, Date Goal Reviewed --  01/23/18   Gait Training Goal PT LTG, Outcome --  goal ongoing     Goal: Stair Training Goal LTG- PT  Outcome: Outcome(s) achieved Date Met: 01/23/18 01/22/18 0930 01/23/18 1051   Stair Training PT LTG   Stair Training Goal PT LTG, Date Established 01/22/18 --    Stair Training Goal PT LTG, Time to Achieve by discharge --    Stair Training Goal PT LTG, Number of Steps 1 --    Stair Training Goal PT LTG, [place] Level conditional independence --    Stair Training Goal PT LTG, Assist Device cane, straight --    Stair Training Goal PT LTG, Date Goal Reviewed --  01/23/18   Stair Training Goal PT LTG, Outcome --  goal ongoing

## 2018-01-23 NOTE — THERAPY DISCHARGE NOTE
Acute Care - Physical Therapy Discharge Summary  [facility]       Patient Name: Kermit Corley  : 1955  MRN: [id number]    Today's Date: 2018  Onset of Illness/Injury or Date of Surgery Date: 18    Date of Referral to PT: 18  Referring Physician: Dr. Ordoñez      Admit Date: 2018      PT Recommendation and Plan    Visit Dx:    ICD-10-CM ICD-9-CM   1. Impaired mobility Z74.09 799.89             Outcome Measures       18 1011 18 0930       How much help from another person do you currently need...    Turning from your back to your side while in flat bed without using bedrails? 4  -MAGDALENA 3  -CB (r) HL (t) CB (c)     Moving from lying on back to sitting on the side of a flat bed without bedrails? 4  -MAGDALENA 4  -CB (r) HL (t) CB (c)     Moving to and from a bed to a chair (including a wheelchair)? 4  -MAGDALENA 4  -CB (r) HL (t) CB (c)     Standing up from a chair using your arms (e.g., wheelchair, bedside chair)? 4  -MAGDALENA 4  -CB (r) HL (t) CB (c)     Climbing 3-5 steps with a railing? 4  -MAGDALENA 3  -CB (r) HL (t) CB (c)     To walk in hospital room? 3  -MAGDALENA 3  -CB (r) HL (t) CB (c)     AM-PAC 6 Clicks Score 23  -MAGDALENA 21  -CB (r) HL (t)     Functional Assessment    Outcome Measure Options AM-PAC 6 Clicks Basic Mobility (PT)  -MAGDALENA AM-PAC 6 Clicks Basic Mobility (PT)  -CB (r) HL (t) CB (c)       User Key  (r) = Recorded By, (t) = Taken By, (c) = Cosigned By    Initials Name Provider Type    CB Estela Lazcano, PT Physical Therapist    MAGDALENA Garcia, PTA Physical Therapy Assistant    ELSIE Angelo, PT Student PT Student                PT Charges       18 1129          Time Calculation    Start Time 1011  -MAGDALENA      Stop Time 1036  -MAGDALENA      Time Calculation (min) 25 min  -MAGDALENA      Time Calculation- PT    Total Timed Code Minutes- PT 25 minute(s)  -MAGDALENA        User Key  (r) = Recorded By, (t) = Taken By, (c) = Cosigned By    Initials Name Provider Type    MAGDALENA Garcia, PTA Physical  Therapy Assistant                  IP PT Goals       01/23/18 1051 01/23/18 1011 01/22/18 0930    Gait Training PT LTG    Gait Training Goal PT LTG, Date Established   01/22/18  -CB (r) HL (t) CB (c)    Gait Training Goal PT LTG, Time to Achieve   by discharge  -CB (r) HL (t) CB (c)    Gait Training Goal PT LTG, [place] Level   conditional independence  -CB (r) HL (t) CB (c)    Gait Training Goal PT LTG, Assist Device   cane, straight  -CB (r) HL (t) CB (c)    Gait Training Goal PT LTG, Distance to Achieve   150 feet  -CB (r) HL (t) CB (c)    Gait Training Goal PT LTG, Additional Goal   Pt's O2 sat will remain at or above 90% with 2L O2 NC and HR at or below 120 with gait  -CB (r) HL (t) CB (c)    Gait Training Goal PT LTG, Date Goal Reviewed  01/23/18  -MAGDALENA     Gait Training Goal PT LTG, Outcome  goal ongoing  -     Stair Training PT LTG    Stair Training Goal PT LTG, Date Established   01/22/18  -CB (r) HL (t) CB (c)    Stair Training Goal PT LTG, Time to Achieve   by discharge  -CB (r) HL (t) CB (c)    Stair Training Goal PT LTG, Number of Steps   1  -CB (r) HL (t) CB (c)    Stair Training Goal PT LTG, [place] Level   conditional independence  -CB (r) HL (t) CB (c)    Stair Training Goal PT LTG, Assist Device   cane, straight  -CB (r) HL (t) CB (c)    Stair Training Goal PT LTG, Date Goal Reviewed 01/23/18  -MAGDALENA      Stair Training Goal PT LTG, Outcome goal ongoing  -        User Key  (r) = Recorded By, (t) = Taken By, (c) = Cosigned By    Initials Name Provider Type    CB Estela Lazcano, PT Physical Therapist    MAGDALENA Garcia, PTA Physical Therapy Assistant    HL Sayra Angelo, PT Student PT Student              PT Discharge Summary  Anticipated Discharge Disposition: home with assist  Reason for Discharge: Discharge from facility, Per MD order  Outcomes Achieved: Patient able to partially acheive established goals  Discharge Destination: Home      Chapito Box, NILSON   1/23/2018

## 2018-01-23 NOTE — THERAPY DISCHARGE NOTE
Acute Care - Occupational Therapy Initial Eval/Discharge  [facility]     Patient Name: Kermit Corley  : 1955  MRN: [id number]  Today's Date: 2018  Onset of Illness/Injury or Date of Surgery Date: 18  Date of Referral to OT: 18  Referring Physician: Jefferson LOVE      Admit Date: 2018       ICD-10-CM ICD-9-CM   1. Impaired mobility Z74. 799.89   2. Impaired mobility and activities of daily living Z74. 799.89     Patient Active Problem List   Diagnosis   • Lung disease   • Tobacco dependence syndrome   • COPD exacerbation   • Insomnia   • Bilateral edema of lower extremity   • Recurrent spontaneous pneumothorax   • COPD, severe   • Asthma   • Chest pain   • Chronic bronchitis with COPD (chronic obstructive pulmonary disease)   • Chronic respiratory failure with hypercapnia   • Edema   • Encounter for chest tube placement   • Hemoptysis   • Hernia of abdominal cavity   • Hypomagnesemia   • Pneumonia   • Pneumothorax, right   • Sepsis   • Respiratory failure   • Tobacco abuse   • Pulmonary nodule   • COPD (chronic obstructive pulmonary disease)     Past Medical History:   Diagnosis Date   • Acute bronchitis    • Acute exacerbation of chronic obstructive airways disease    • Chronic bronchitis    • Chronic obstructive lung disease    • Hypoxemia    • IgE deficiency     mediated allergic asthma   • Inguinal hernia     left side   • Lymphadenopathy     left groin     Past Surgical History:   Procedure Laterality Date   • HERNIA REPAIR      with mesh   • INJECTION OF MEDICATION      Celestone  (2)   • INJECTION OF MEDICATION      Depo Medrol  (2)          OT ASSESSMENT FLOWSHEET (last 72 hours)      OT Evaluation       18 1400 18 1011 18 1000 18 0930 18 1439    Rehab Evaluation    Document Type  therapy note (daily note)  -MAGDALENA evaluation  -MR evaluation  -CB (r) HL (t) CB (c)     Subjective Information  agree to therapy  -MAGDALENA agree to therapy  -MR agree  "to therapy;complains of;pain   SOB  -CB (r) HL (t) CB (c)     Patient Effort, Rehab Treatment  good  -MAGDALENA good  -MR good  -CB (r) HL (t) CB (c)     Patient Effort, Rehab Treatment Comment  \"I'm feeling much better\"  -MAGDALENA       Symptoms Noted During/After Treatment  shortness of breath  -MAGDALENA shortness of breath  -MR shortness of breath  -CB (r) HL (t) CB (c)     Symptoms Noted Comment   Entered room at 10:50  -MR HR increased to 128 after walking 40' with SP cane and 2L O2.  O2 sat remained at 90%  -CB (r) HL (t) CB (c)     General Information    Patient Profile Review   yes  -MR yes  -CB (r) HL (t) CB (c)     Onset of Illness/Injury or Date of Surgery Date   01/19/18  -MR 01/19/18  -CB     Referring Physician   Jefferson LOVE  -MR Dr. Ordoñez  -CB (r) HL (t) CB (c)     General Observations   pt sitting EOB, on room air  -MR pt sitting EOB with telemetry, Iv, O2 at @l NC  -CB (r) HL (t) CB (c)     Pertinent History Of Current Problem   Pt admitted d/t COPD exacerbation  -MR pt admitted d/t COPD exacerbation  -CB (r) HL (t) CB (c)     Precautions/Limitations  fall precautions;oxygen therapy device and L/min  -MAGDALENA fall precautions;oxygen therapy device and L/min  -MR fall precautions;oxygen therapy device and L/min  -CB (r) HL (t) CB (c)     Prior Level of Function   independent:;all household mobility;community mobility;transfer;ADL's;home management;cooking;cleaning;driving  -MR independent:;gait;ADL's   used SP cane at home  -CB (r) HL (t) CB (c)     Equipment Currently Used at Home   oxygen;walker, rolling;cane, straight;hospital bed;shower chair  -MR oxygen;walker, rolling;cane, straight   pt has hospital bed but does not use  -CB (r) HL (t) CB (c) cane, quad;cane, straight;walker, rolling;oxygen   community oxygen  -EW    Plans/Goals Discussed With   patient;agreed upon  -MR patient  -CB (r) HL (t) CB (c)     Risks Reviewed   patient:;LOB;change in vital signs  -MR patient:;change in vital signs  -CB (r) HL (t) CB (c)     " Benefits Reviewed   patient:;improve function;increase independence;increase strength;increase balance  -MR patient:;improve function  -CB (r) HL (t) CB (c)     Barriers to Rehab   none identified  -MR      Living Environment    Lives With   alone  -MR alone   pt stated he has neighbors & family to help while he recover  -CB (r) HL (t) CB (c) alone  -EW    Living Arrangements   mobile home  -MR mobile home  -CB (r) HL (t) CB (c) house  -EW    Home Accessibility   ramps present at home;bed and bath on same level;tub/shower is not walk in  -MR ramps present at home;stairs to enter home   may not be an identified ramp but walkway inclines upward to  -CB     Number of Stairs to Enter Home   1  -MR 1  -CB (r) HL (t) CB (c)     Stair Railings at Home   none  -MR none  -CB (r) HL (t) CB (c)     Transportation Available   car  -MR car  -CB (r) HL (t) CB (c) car  -EW    Clinical Impression    Date of Referral to OT   01/23/18  -MR      OT Diagnosis   impaired mobility and ADL's  -MR      Prognosis   good  -MR      Functional Level At Time Of Evaluation   impaired mobility and ADL's  -MR      Impairments Found (describe specific impairments)   aerobic capacity/endurance;ergonomics and body mechanics;muscle performance  -MR      Patient/Family Goals Statement   Return home and get back to normal  -MR      Criteria for Skilled Therapeutic Interventions Met   yes;treatment indicated  -MR      Rehab Potential   good, to achieve stated therapy goals  -MR      Therapy Frequency   other (see comments)   2* pt d/c hospital this date  -MR      Anticipated Discharge Disposition home with assist;home with home health  -MR  home with assist;home with home health  -MR      Functional Level Prior    Ambulation   1-->assistive equipment  -MR      Transferring   1-->assistive equipment  -MR      Toileting   0-->independent  -MR      Bathing   0-->independent  -MR      Dressing   0-->independent  -MR      Eating   0-->independent  -MR       Communication   0-->understands/communicates without difficulty  -MR      Swallowing   0-->swallows foods/liquids without difficulty  -MR      Vital Signs    Pre Systolic BP Rehab  150  -MAGDALENA 142  -  -CB (r) HL (t) CB (c)     Pre Treatment Diastolic BP  75  -MAGDALENA 70  -MR 69  -CB (r) HL (t) CB (c)     Post Systolic BP Rehab  142  -MAGDALENA  144  -CB (r) HL (t) CB (c)     Post Treatment Diastolic BP  70  -MAGDALENA  75  -CB (r) HL (t) CB (c)     Pretreatment Heart Rate (beats/min)  74  -MAGDALENA 81  -MR 84  -CB (r) HL (t) CB (c)     Intratreatment Heart Rate (beats/min)  92  -MAGDALENA  128  -CB (r) HL (t) CB (c)     Posttreatment Heart Rate (beats/min)  80  -MAGDALENA 85  -  -CB (r) HL (t) CB (c)     Pre SpO2 (%)  98  -MAGDALENA 91  -MR 96  -CB (r) HL (t) CB (c)     O2 Delivery Pre Treatment  supplemental O2  -MAGDALENA room air  -MR supplemental O2   2L  -CB (r) HL (t) CB (c)     Intra SpO2 (%)  95  -MAGDALENA  90  -CB (r) HL (t) CB (c)     O2 Delivery Intra Treatment  supplemental O2  -MAGDALENA  supplemental O2   2L  -CB (r) HL (t) CB (c)     Post SpO2 (%)  98  -MAGDALENA 95  -MR 90  -CB (r) HL (t) CB (c)     O2 Delivery Post Treatment  supplemental O2  -MAGDALENA supplemental O2   2L  -MR supplemental O2   2L  -CB (r) HL (t) CB (c)     Pre Patient Position  Sitting  -MAGDALENA Sitting  -MR Sitting  -CB (r) HL (t) CB (c)     Intra Patient Position   Standing  -MR Standing  -CB (r) HL (t) CB (c)     Post Patient Position  Sitting  -MAGDALENA Sitting  -MR Sitting  -CB (r) HL (t) CB (c)     Pain Assessment    Pain Assessment  0-10  -MAGDALENA 0-10  -MR 0-10  -CB (r) HL (t) CB (c)     Pain Score  0  -MGADALENA 0  -MR 2  -CB (r) HL (t) CB (c)     Post Pain Score  0  -MAGDALENA 0  -MR 2  -CB (r) HL (t) CB (c)     Pain Location    Chest   from coughing  -CB (r) HL (t) CB (c)     Pain Intervention(s)    Ambulation/increased activity  -CB (r) HL (t) CB (c)     Vision Assessment/Intervention    Visual Impairment  WFL with corrective lenses  -MAGDALENA WFL with corrective lenses  -MR WFL with corrective lenses  -CB (r) HL (t) CB  (c)     Cognitive Assessment/Intervention    Current Cognitive/Communication Assessment  functional  -MAGDALENA functional  -MR functional  -CB (r) HL (t) CB (c)     Orientation Status  oriented x 4  -MAGDALENA oriented x 4  -MR oriented to;person;place;time  -CB (r) HL (t) CB (c)     Follows Commands/Answers Questions  100% of the time  -MAGDALENA 100% of the time  -% of the time  -CB (r) HL (t) CB (c)     Personal Safety  WNL/WFL  -MAGDALENA WNL/WFL  -MR good awareness, safety precautions   pt needed cueing to slow down and take deep breaths w/ gait  -CB (r) HL (t) CB (c)     Personal Safety Interventions  gait belt;muscle strengthening facilitated;nonskid shoes/slippers when out of bed;supervised activity  -MAGDALENA fall prevention program maintained;gait belt;muscle strengthening facilitated;nonskid shoes/slippers when out of bed;supervised activity  -MR gait belt;fall prevention program maintained;nonskid shoes/slippers when out of bed  -CB (r) HL (t) CB (c)     ROM (Range of Motion)    General ROM   no range of motion deficits identified  -MR no range of motion deficits identified  -CB (r) HL (t) CB (c)     MMT (Manual Muscle Testing)    General MMT Assessment   no strength deficits identified  -MR no strength deficits identified  -CB (r) HL (t) CB (c)     General MMT Assessment Detail   BUE grossly 4+/5  -MR gross bilateral UE 4+/5, LE 4+/5  -CB (r) HL (t) CB (c)     Bed Mobility, Assessment/Treatment    Bed Mobility, Roll Left, [place]  not tested  -MAGDALENA       Bed Mobility, Comment   Pt sitting EOB upon entry  -MR      Transfer Assessment/Treatment    Transfers, Sit-Stand [place]  conditional independence  -MAGDALENA supervision required;conditional independence  -MR supervision required  -CB (r) HL (t) CB (c)     Transfers, Stand-Sit [place]  conditional independence  -MAGDALENA supervision required;conditional independence  -MR supervision required  -CB (r) HL (t) CB (c)     Transfers, Sit-Stand-Sit, Assist Device  straight cane  -MAGDALENA  straight cane  -MR straight cane  -CB     Functional Mobility    Functional Mobility- Ind. Level   supervision required;conditional independence  -MR      Functional Mobility- Device   --   none  -MR      Functional Mobility-Distance (Feet)   10  -MR      Stairs Assessment/Treatment    Number of Stairs  1   curb step  -       Stairs, Handrail Location  none  -       Stairs, [place] Level  conditional independence  -       Stairs, Assistive Device  straight cane  -       Stairs, Technique Used  step to step (ascending);step to step (descending)  -       Stairs, Comment  2 attempts. no evidence of imbalance.   -       Lower Body Dressing Assessment/Training    LB Dressing Assess/Train, Clothing Type   doffing:;donning:;socks  -MR      LB Dressing Assess/Train, Position   sitting;edge of bed  -MR      LB Dressing Assess/Train, [place]   conditional independence  -MR      Toileting Assessment/Training    Toileting Assess/Train, Position   standing  -MR      Toileting Assess/Train, Indepen Level   supervision required;conditional independence  -MR      Motor Skills/Interventions    Additional Documentation   Balance Skills Training (Group);Fine Motor Coordination Training (Group)  -MR      Balance Skills Training    Sitting-Level of Assistance   Independent  -MR      Standing-Level of Assistance   Distant supervision  -MR      Gait Balance-Level of Assistance   Distant supervision  -MR      Fine Motor Coordination Training    Opposition   Right:;Left:;intact  -MR      Sensory Assessment/Intervention    Light Touch   LUE;RUE  -MR LUE;RUE;LLE;RLE  -CB (r) HL (t) CB (c)     LUE Light Touch   WNL  -MR WNL  -CB (r) HL (t) CB (c)     RUE Light Touch   WNL  -MR WNL  -CB (r) HL (t) CB (c)     LLE Light Touch    WNL  -CB (r) HL (t) CB (c)     RLE Light Touch    WNL  -CB (r) HL (t) CB (c)     Proprioception   LUE;RUE  -MR      LUE Proprioception   WNL  -MR      RUE Proprioception   WNL  -MR      General  Therapy Interventions    Planned Therapy Interventions   activity intolerance;adaptive equipment training;ADL retraining;IADL retraining;balance training;energy conservation;home exercise program;joint mobilization;motor coordination training;ROM (Range of Motion);strengthening;stretching;transfer training  -MR      Positioning and Restraints    Pre-Treatment Position  in bed  -MAGDALENA in bed  -MR sitting in chair/recliner  -CB (r) HL (t) CB (c)     Post Treatment Position  bed  -MAGDALENA bed  -MR bed  -CB (r) HL (t) CB (c)     In Bed  sitting EOB;call light within reach;encouraged to call for assist   all needs met.   -MAGDALENA sitting EOB;call light within reach;encouraged to call for assist;side rails up x2  -MR sitting EOB;call light within reach;encouraged to call for assist  -CB (r) HL (t) CB (c)       01/21/18 1437                Functional Level Prior    Ambulation 1-->assistive equipment   cane  -EW        Transferring 1-->assistive equipment  -EW        Toileting 0-->independent  -EW        Bathing 0-->independent  -EW        Dressing 0-->independent  -EW        Eating 0-->independent  -EW        Communication 0-->understands/communicates without difficulty  -EW          User Key  (r) = Recorded By, (t) = Taken By, (c) = Cosigned By    Initials Name Effective Dates    CB Estela Lazcano, PT 04/06/17 -     MAGDALENA Saman Garcia, PTA 10/17/16 -     EW Zuleyka Chen, LSW 10/17/16 -     MR Myriam Wiggins, OT 11/08/17 -     HL Sayra Angelo, PT Student 12/05/17 -           Occupational Therapy Education     Title: PT OT SLP Therapies (Active)     Topic: Occupational Therapy (Resolved)     Point: ADL training (Resolved)    Description: Instruct learner(s) on proper safety adaptation and remediation techniques during self care or transfers.   Instruct in proper use of assistive devices.    Learning Progress Summary    Learner Readiness Method Response Comment Documented by Status   Patient Acceptance E VU Pt educated on role of OT  and POC. Pt educated on safety with t/f, safety with O2 use at home, and benefit of activity. MR 01/23/18 1353 Done               Point: Precautions (Resolved)    Description: Instruct learner(s) on prescribed precautions during self-care and functional transfers.    Learning Progress Summary    Learner Readiness Method Response Comment Documented by Status   Patient Acceptance E VU Pt educated on role of OT and POC. Pt educated on safety with t/f, safety with O2 use at home, and benefit of activity. MR 01/23/18 1353 Done                      User Key     Initials Effective Dates Name Provider Type Discipline    MR 11/08/17 -  Myriam Wiggins, OT Occupational Therapist OT                OT Recommendation and Plan  Anticipated Discharge Disposition: home with assist, home with home health  Planned Therapy Interventions: activity intolerance, adaptive equipment training, ADL retraining, IADL retraining, balance training, energy conservation, home exercise program, joint mobilization, motor coordination training, ROM (Range of Motion), strengthening, stretching, transfer training  Therapy Frequency: other (see comments) (2* pt d/c hospital this date)  Plan of Care Review  Plan Of Care Reviewed With: patient  Outcome Summary/Follow up Plan: OT tammi completed this date. Pt completed bed mobility, sit <> stand t/f, min distance functional ambulation with cond I/sup increased SOB was noted with increased activity. Pt d/c this date, he may have benefited from skilled OT services to increase safety awareness with O2 use, and to address decreased strength, t/f, activity tolerance, and independence with ADL's/IADL's. OT goals would have focused on increasing activity tolerance, being independent with t/f and ADL's, and increasing knowledge/safety awareness. Recommend pt d/c with assist/HH; pending pt progress.                Outcome Measures       01/23/18 1050 01/23/18 1011 01/22/18 0930    How much help from another person  do you currently need...    Turning from your back to your side while in flat bed without using bedrails?  4  -MAGDALENA 3  -CB (r) HL (t) CB (c)    Moving from lying on back to sitting on the side of a flat bed without bedrails?  4  -MAGDALENA 4  -CB (r) HL (t) CB (c)    Moving to and from a bed to a chair (including a wheelchair)?  4  -MAGDALENA 4  -CB (r) HL (t) CB (c)    Standing up from a chair using your arms (e.g., wheelchair, bedside chair)?  4  -MAGDALENA 4  -CB (r) HL (t) CB (c)    Climbing 3-5 steps with a railing?  4  -MAGDALENA 3  -CB (r) HL (t) CB (c)    To walk in hospital room?  3  -MAGDALENA 3  -CB (r) HL (t) CB (c)    AM-PAC 6 Clicks Score  23  -MAGDALENA 21  -CB (r) HL (t)    How much help from another is currently needed...    Putting on and taking off regular lower body clothing? 4  -MR      Bathing (including washing, rinsing, and drying) 4  -MR      Toileting (which includes using toilet bed pan or urinal) 4  -MR      Putting on and taking off regular upper body clothing 4  -MR      Taking care of personal grooming (such as brushing teeth) 4  -MR      Eating meals 4  -MR      Score 24  -MR      Functional Assessment    Outcome Measure Options AM-PAC 6 Clicks Daily Activity (OT)  - AM-PAC 6 Clicks Basic Mobility (PT)  -MAGDALENA AM-PAC 6 Clicks Basic Mobility (PT)  -CB (r) HL (t) CB (c)      User Key  (r) = Recorded By, (t) = Taken By, (c) = Cosigned By    Initials Name Provider Type    CB Estela Lazcano, PT Physical Therapist    MAGDALENA Garcia, PTA Physical Therapy Assistant    MR Myriam Wiggins, OT Occupational Therapist    ELSIE Angelo, PT Student PT Student          Time Calculation:         Time Calculation- OT       01/23/18 1401          Time Calculation- OT    OT Start Time 1050  -MR      OT Stop Time 1109  -MR      OT Time Calculation (min) 19 min  -MR      OT Received On 01/23/18  -MR      OT Goal Re-Cert Due Date 02/05/18  -        User Key  (r) = Recorded By, (t) = Taken By, (c) = Cosigned By    Initials Name Provider Type     MR Myriam E Feliciano, OT Occupational Therapist          Therapy Charges for Today     Code Description Service Date Service Provider Modifiers Qty    22546838053 HC OT SELFCARE CURRENT 1/23/2018 Myriam Wiggins OT GO, CI 1    64841405983 HC OT SELFCARE PROJECTED 1/23/2018 Myriam ANDERSON Feliciano OT GO, CI 1    79544237953 HC OT SELFCARE DISCHARGE 1/23/2018 Myriam JUSTIN Wiggins OT GO, CI 1    87410191200 HC OT EVAL MOD COMPLEXITY 1 1/23/2018 Myriam Wiggins OT GO 1          OT G-codes  OT Professional Judgement Used?: Yes  OT Functional Scales Options: AM-PAC 6 Clicks Daily Activity (OT)  Score: 24  Functional Limitation: Self care  Self Care Current Status (): At least 1 percent but less than 20 percent impaired, limited or restricted  Self Care Goal Status (): At least 1 percent but less than 20 percent impaired, limited or restricted  Self Care Discharge Status (): At least 1 percent but less than 20 percent impaired, limited or restricted    OT Discharge Summary  Anticipated Discharge Disposition: home with assist, home with home health  Reason for Discharge: Discharge from facility, Per MD order  Outcomes Achieved: Discharge from facility occurred on same date as evluation  Discharge Destination: Home    Myriam Wiggins OT  1/23/2018

## 2018-01-23 NOTE — PLAN OF CARE
Problem: Patient Care Overview (Adult)  Goal: Plan of Care Review  Outcome: Ongoing (interventions implemented as appropriate)   01/23/18 2885   Coping/Psychosocial Response Interventions   Plan Of Care Reviewed With patient   Outcome Evaluation   Outcome Summary/Follow up Plan OT eval completed this date. Pt completed bed mobility, sit <> stand t/f, min distance functional ambulation with cond I/sup increased SOB was noted with increased activity. Pt d/c this date, he may have benefited from skilled OT services to increase safety awareness with O2 use, and to address decreased strength, t/f, activity tolerance, and independence with ADL's/IADL's. OT goals would have focused on increasing activity tolerance, being independent with t/f and ADL's, and increasing knowledge/safety awareness. Recommend pt d/c with assist/HH; pending pt progress.

## 2018-01-23 NOTE — THERAPY TREATMENT NOTE
"Acute Care - Physical Therapy Treatment Note  [facility]     Patient Name: Kermit Corley  : 1955  MRN: [id number]  Today's Date: 2018  Onset of Illness/Injury or Date of Surgery Date: 18  Date of Referral to PT: 18  Referring Physician: Dr. Ordoñez    Admit Date: 2018    Visit Dx:    ICD-10-CM ICD-9-CM   1. Impaired mobility Z74.09 799.89     Patient Active Problem List   Diagnosis   • Lung disease   • Tobacco dependence syndrome   • COPD exacerbation   • Insomnia   • Bilateral edema of lower extremity   • Recurrent spontaneous pneumothorax   • COPD, severe   • Asthma   • Chest pain   • Chronic bronchitis with COPD (chronic obstructive pulmonary disease)   • Chronic respiratory failure with hypercapnia   • Edema   • Encounter for chest tube placement   • Hemoptysis   • Hernia of abdominal cavity   • Hypomagnesemia   • Pneumonia   • Pneumothorax, right   • Sepsis   • Respiratory failure   • Tobacco abuse   • Pulmonary nodule   • COPD (chronic obstructive pulmonary disease)               Adult Rehabilitation Note       18 1011          Rehab Assessment/Intervention    Discipline physical therapy assistant  -MAGDALENA      Document Type therapy note (daily note)  -MAGDALENA      Subjective Information agree to therapy  -MAGDALENA      Patient Effort, Rehab Treatment good  -MAGDALENA      Patient Effort, Rehab Treatment Comment \"I'm feeling much better\"  -MAGDALENA      Symptoms Noted During/After Treatment shortness of breath  -MAGDALENA      Precautions/Limitations fall precautions;oxygen therapy device and L/min  -MAGDALENA      Recorded by [MAGDALENA] Saman Garcia PTA      Vital Signs    Pre Systolic BP Rehab 150  -MAGDALENA      Pre Treatment Diastolic BP 75  -MAGDALENA      Post Systolic BP Rehab 142  -MAGDALENA      Post Treatment Diastolic BP 70  -MAGDALENA      Pretreatment Heart Rate (beats/min) 74  -MAGDALENA      Intratreatment Heart Rate (beats/min) 92  -MAGDALENA      Posttreatment Heart Rate (beats/min) 80  -MAGDALENA      Pre SpO2 (%) 98  -MAGDALENA      O2 " Delivery Pre Treatment supplemental O2  -MAGDALENA      Intra SpO2 (%) 95  -MAGDALENA      O2 Delivery Intra Treatment supplemental O2  -MAGDALENA      Post SpO2 (%) 98  -MAGDALENA      O2 Delivery Post Treatment supplemental O2  -MAGDALENA      Pre Patient Position Sitting  -MAGDALENA      Post Patient Position Sitting  -MAGDALENA      Recorded by [MAGDALENA] Saman Garcia PTA      Pain Assessment    Pain Assessment 0-10  -MAGDALENA      Pain Score 0  -MAGDALENA      Post Pain Score 0  -MAGDALENA      Recorded by [MAGDALENA] Saman Garcia PTA      Vision Assessment/Intervention    Visual Impairment WFL with corrective lenses  -MAGDALENA      Recorded by [MAGDALENA] Saman Garcia PTA      Cognitive Assessment/Intervention    Current Cognitive/Communication Assessment functional  -MAGDALENA      Orientation Status oriented x 4  -MAGDALENA      Follows Commands/Answers Questions 100% of the time  -MAGDALENA      Personal Safety WNL/WFL  -MAGDALENA      Personal Safety Interventions gait belt;muscle strengthening facilitated;nonskid shoes/slippers when out of bed;supervised activity  -MAGDALENA      Recorded by [MAGDALENA] Saman Garcia PTA      Bed Mobility, Assessment/Treatment    Bed Mobility, Roll Left, [place] not tested  -MAGDALENA      Recorded by [MAGDALENA] Saman Garcia PTA      Transfer Assessment/Treatment    Transfers, Sit-Stand [place] conditional independence  -MAGDALENA      Transfers, Stand-Sit [place] conditional independence  -MAGDALENA      Transfers, Sit-Stand-Sit, Assist Device straight cane  -MAGDALENA      Recorded by [MAGDALENA] Saman Garcia PTA      Gait Assessment/Treatment    Gait, [place] Level stand by assist;conditional independence  -MAGDALENA      Gait, Assistive Device straight cane  -MAGDALENA      Gait, Distance (Feet) 148   x2  -MAGDALENA      Gait, Gait Pattern Analysis swing-through gait  -MAGDALENA      Gait, Safety Issues supplemental O2  -MAGDALENA      Gait, Comment some SOA but O2 sat WFL. no evidence of imbalance.   -MAGDALENA      Recorded by [MAGDALENA] Saman Garcia PTA      Stairs Assessment/Treatment    Number of Stairs 1   curb step  -MAGDALENA      Stairs,  Handrail Location none  -MAGDALENA      Stairs, [place] Level conditional independence  -MAGDALENA      Stairs, Assistive Device straight cane  -      Stairs, Technique Used step to step (ascending);step to step (descending)  -      Stairs, Comment 2 attempts. no evidence of imbalance.   -MAGDALENA      Recorded by [MAGDALENA] Saman Garcia PTA      Positioning and Restraints    Pre-Treatment Position in bed  -MAGDALENA      Post Treatment Position bed  -MAGDALENA      In Bed sitting EOB;call light within reach;encouraged to call for assist   all needs met.   -MAGDALENA      Recorded by [MAGDALENA] Saman Garcia PTA        User Key  (r) = Recorded By, (t) = Taken By, (c) = Cosigned By    Initials Name Effective Dates    MAGDALENA Saman Garcia PTA 10/17/16 -                 IP PT Goals       01/23/18 1051 01/23/18 1011 01/22/18 0930    Gait Training PT LTG    Gait Training Goal PT LTG, Date Established   01/22/18  -CB (r) HL (t) CB (c)    Gait Training Goal PT LTG, Time to Achieve   by discharge  -CB (r) HL (t) CB (c)    Gait Training Goal PT LTG, [place] Level   conditional independence  -CB (r) HL (t) CB (c)    Gait Training Goal PT LTG, Assist Device   cane, straight  -CB (r) HL (t) CB (c)    Gait Training Goal PT LTG, Distance to Achieve   150 feet  -CB (r) HL (t) CB (c)    Gait Training Goal PT LTG, Additional Goal   Pt's O2 sat will remain at or above 90% with 2L O2 NC and HR at or below 120 with gait  -CB (r) HL (t) CB (c)    Gait Training Goal PT LTG, Date Goal Reviewed  01/23/18  -MAGDALENA     Gait Training Goal PT LTG, Outcome  goal ongoing  -     Stair Training PT LTG    Stair Training Goal PT LTG, Date Established   01/22/18  -CB (r) HL (t) CB (c)    Stair Training Goal PT LTG, Time to Achieve   by discharge  -CB (r) HL (t) CB (c)    Stair Training Goal PT LTG, Number of Steps   1  -CB (r) HL (t) CB (c)    Stair Training Goal PT LTG, [place] Level   conditional independence  -CB (r) HL (t) CB (c)    Stair Training Goal PT LTG, Assist Device    cane, straight  -CB (r) HL (t) CB (c)    Stair Training Goal PT LTG, Date Goal Reviewed 01/23/18  -      Stair Training Goal PT LTG, Outcome goal ongoing  -        User Key  (r) = Recorded By, (t) = Taken By, (c) = Cosigned By    Initials Name Provider Type    CB Estela Lazcano, PT Physical Therapist    MAGDALENA Garcia, PTA Physical Therapy Assistant     Sayra Angelo, PT Student PT Student          Physical Therapy Education     Title: PT OT SLP Therapies (Active)     Topic: Physical Therapy (Active)     Point: Mobility training (Active)    Learning Progress Summary    Learner Readiness Method Response Comment Documented by Status   Patient Acceptance E NR  MAGDALENA 01/23/18 1141 Active    Acceptance E DU  HL 01/22/18 1020 Done               Point: Precautions (Done)    Learning Progress Summary    Learner Readiness Method Response Comment Documented by Status   Patient Acceptance E DU  HL 01/22/18 1020 Done                      User Key     Initials Effective Dates Name Provider Type Discipline    MAGDALENA 10/17/16 -  Saman Garcia, NILSON Physical Therapy Assistant PT     12/05/17 -  Sayra Angelo, PT Student PT Student PT                    PT Recommendation and Plan  Anticipated Equipment Needs At Discharge:  (pt needs to have the tip of his SP cane replaced.)  Anticipated Discharge Disposition: home with assist  Planned Therapy Interventions: balance training, bed mobility training, gait training, home exercise program, patient/family education, strengthening, stair training  PT Frequency: other (see comments) (5-14 visits)  Plan of Care Review  Plan Of Care Reviewed With: patient  Progress: progress toward functional goals as expected  Outcome Summary/Follow up Plan: pt responded well to therapy/ py w/ increased gait to 148 ft with SBA-mod indep. pts vitals remained WFL. pt indep w/ curb steps training. pt met 1 new goal this tx. pt would benefit from  PT upon DC.           Outcome Measures       01/23/18 1011  01/22/18 0930       How much help from another person do you currently need...    Turning from your back to your side while in flat bed without using bedrails? 4  -MAGDALENA 3  -CB (r) HL (t) CB (c)     Moving from lying on back to sitting on the side of a flat bed without bedrails? 4  -MAGDALENA 4  -CB (r) HL (t) CB (c)     Moving to and from a bed to a chair (including a wheelchair)? 4  -MAGDALENA 4  -CB (r) HL (t) CB (c)     Standing up from a chair using your arms (e.g., wheelchair, bedside chair)? 4  -MAGDALENA 4  -CB (r) HL (t) CB (c)     Climbing 3-5 steps with a railing? 4  -MAGDALENA 3  -CB (r) HL (t) CB (c)     To walk in hospital room? 3  -MAGDALENA 3  -CB (r) HL (t) CB (c)     AM-PAC 6 Clicks Score 23  -MAGDALENA 21  -CB (r) HL (t)     Functional Assessment    Outcome Measure Options AM-PAC 6 Clicks Basic Mobility (PT)  -MAGDALENA AM-PAC 6 Clicks Basic Mobility (PT)  -CB (r) HL (t) CB (c)       User Key  (r) = Recorded By, (t) = Taken By, (c) = Cosigned By    Initials Name Provider Type    CB Estela Lazcano, PT Physical Therapist    MAGDALENA Garcia PTA Physical Therapy Assistant     Sayra Angelo, PT Student PT Student           Time Calculation:         PT Charges       01/23/18 1129          Time Calculation    Start Time 1011  -      Stop Time 1036  -      Time Calculation (min) 25 min  -      Time Calculation- PT    Total Timed Code Minutes- PT 25 minute(s)  -        User Key  (r) = Recorded By, (t) = Taken By, (c) = Cosigned By    Initials Name Provider Type    MAGDALENA Garcia PTA Physical Therapy Assistant          Therapy Charges for Today     Code Description Service Date Service Provider Modifiers Qty    01070271282 HC GAIT TRAINING EA 15 MIN 1/23/2018 Saman Garcia PTA GP 1    61190542407 HC PT THERAPEUTIC ACT EA 15 MIN 1/23/2018 Saman Garcia PTA GP 1          PT G-Codes  PT Professional Judgement Used?: Yes  Outcome Measure Options: AM-PAC 6 Clicks Basic Mobility (PT)  Score: 21  Functional Limitation: Mobility: Walking and  moving around  Mobility: Walking and Moving Around Current Status (): At least 20 percent but less than 40 percent impaired, limited or restricted  Mobility: Walking and Moving Around Goal Status (): At least 1 percent but less than 20 percent impaired, limited or restricted    Saman Garcia, PTA  1/23/2018

## 2018-01-24 NOTE — DISCHARGE SUMMARY
[address] [address]  T - [phone]     DISCHARGE SUMMARY         PATIENT  DEMOGRAPHICS   PATIENT NAME: Kermit Corley                      PHYSICIAN: Onur Paulino MD  : 1955  MRN: [id number]    ADMISSION/DISCHARGE INFO   ADMISSION DATE: 2018   DISCHARGE DATE: 2018    ADMISSION DIAGNOSES: COPD (chronic obstructive pulmonary disease) [J44.9]  DISCHARGE DIAGNOSES:     Active Problems:    Tobacco dependence syndrome    Chronic respiratory failure with hypercapnia    COPD (chronic obstructive pulmonary disease)      SERVICE:  Medicine       CONSULTS   Consult Orders (all)     None          PROCEDURES     Imaging Results (last 24 hours)     ** No results found for the last 24 hours. **          Xr Chest Pa & Lateral    Result Date: 2018  Narrative: Chest x-ray PA and lateral CLINICAL INDICATION: Shortness of breath. COPD exacerbation COMPARISON: Chest x-ray March 10, 2015 FINDINGS: Cardiac silhouette is normal in size. Pulmonary vascularity is unremarkable. Flattening both diaphragms and increase in the retrosternal airspace indicating air trapping, COPD. Emphysematous changes especially in both upper lobes. Numerous benign calcified granulomata throughout both lung fields.     Impression: CONCLUSION: Flattening both diaphragms and increase in the retrosternal airspace indicating air trapping, COPD. Emphysematous changes especially both upper lobes. Numerous benign calcified granulomata in both lung fields without significant changes since prior exam. No evidence of active disease. Electronically signed by:  Eyal Hou MD  2018 5:03 PM CST Workstation: [id number]      HISTORY OF PRESENT ILLNESS   Kermit Corley is a 62 y.o. male with a history of O2 dependent COPD and nicotine dependence who was brought in as a direct admission from the office of Dr. Lee secondary to acute exacerbation of COPD.  Patient has had  progressively worsening shortness of breath associated with a cough productive of green sputum within the past 1-2 weeks.  Patient has been seen and [facility] emergency Department 4 days ago and was given outpatient treatment.  Despite the outpatient treatment including antimicrobial patient continued to remain symptomatic.     DIAGNOSTIC DATA   Labs   Images     HOSPITAL COURSE   Patient was admitted to the medical floor, he was started on IV antibiotic and steroid.  He was also provided with a nebulizer bronchodilator.  Patient uses home oxygen which was continued while his stay in the hospital.  Patient symptoms improved significant bili.  His breathing improved significant.  Patient was counseled regarding his smoking cessation however he continue to persist to smoke.  Patient is urged not to smoke while he has oxygen on.  Patient understand that not to smoke while he is using oxygen.  Patient is reminded to complete the course of antibiotic and steroid.  Patient is to follow-up with his PCP or come back to ER if his symptom gets any worse.  Patient will be going home with transitional home health.    Physical Exam   Constitutional: He is oriented to person, place, and time. He appears well-developed.   HENT:   Head: Normocephalic.   Eyes: EOM are normal. Pupils are equal, round, and reactive to light.   Neck: Normal range of motion.   Cardiovascular: Normal rate, regular rhythm and normal heart sounds.    Pulmonary/Chest: Effort normal and breath sounds normal.   Abdominal: Soft. Bowel sounds are normal.   Musculoskeletal: Normal range of motion.   Neurological: He is alert and oriented to person, place, and time.   Skin: Skin is warm.   Psychiatric: He has a normal mood and affect. His behavior is normal.   Vitals reviewed.         DISCHARGE CONDITION   Stable    DISPOSITION   To home with home health      DISCHARGE MEDICATIONS      Kermit Corley   Home Medication Instructions  PUMA:[id number]    Printed on:01/23/18 9675   Medication Information                      albuterol (PROVENTIL HFA;VENTOLIN HFA) 108 (90 BASE) MCG/ACT inhaler  Inhale 2 puffs Every 6 (Six) Hours As Needed for Wheezing or Shortness of Air.             albuterol (PROVENTIL) (2.5 MG/3ML) 0.083% nebulizer solution  USE ONE VIAL IN NEBULIZER EVERY 4 HOURS AS NEEDED FOR WHEEZING             benzonatate (TESSALON PERLES) 100 MG capsule  Take 1 capsule by mouth 3 (Three) Times a Day As Needed for Cough.             ipratropium (ATROVENT) 0.02 % nebulizer solution  FOUR TIMES DAILY             IPRATROPIUM-ALBUTEROL IN  Inhale 3 mL every 4 (four) hours as needed.             levoFLOXacin (LEVAQUIN) 500 MG tablet  Take 1 tablet by mouth Daily.             montelukast (SINGULAIR) 10 MG tablet  TAKE ONE TABLET BY MOUTH EVERY NIGHT             nicotine (NICODERM CQ) 7 MG/24HR patch  DAILY             predniSONE (DELTASONE) 10 MG tablet  Take 4 tablets by mouth Daily With Breakfast. Take 2 tablets for 2 days, take 1 tablet for 2 days, take 1/2 tablet for 2 days.             Respiratory Therapy Supplies (NEBULIZER) device  As needed for inhalation prn             tiotropium (SPIRIVA HANDIHALER) 18 MCG per inhalation capsule  Place 1 capsule into inhaler and inhale Daily.             traZODone (DESYREL) 50 MG tablet  50 mg Every Night.                   INSTRUCTIONS   Activity:   Activity Instructions     Discharge Activity                     Diet: regular     Special Instructions: Patient instructed to call M.D. or return to ED with worsening shortness of breath, chest pain, fever greater than 100.4°F or any other medical concerns.    FOLLOW UP   Additional Instructions for the Follow-ups that You Need to Schedule     Ambulatory Referral to Home Health    As directed    Face to Face Visit Date:  1/23/2018    Follow-up Provider for Plan of Care?:  I treated the patient in an acute care facility and will not continue treatment  after discharge.    Follow-up Provider:  MARIBEL RAMON [[id number]]    Reason/Clinical Findings:  transitional    Describe mobility limitations that make leaving home difficult:  transitional    Nursing/Therapeutic Services Requested:  Other (transitional )    Frequency:  1 Week 1                 Follow-up Information     Follow up with Maribel Ramon MD Follow up in 3 day(s).    Specialty:  Family Medicine    Contact information:    [address] [place] [address] [address] Donna RODRIGUEZ [address] [phone]          Follow up with [facility] .    Specialty:  Home Health Services    Contact information:    [facility] [address] [place] [address] [phone]         PENDING TEST RESULTS AT DISCHARGE      TIME   Time: 39 minutes were spent planning this discharge.                      This document has been electronically signed by Onur Paulino MD on January 23, 2018 6:26 PM

## 2018-01-24 NOTE — PAYOR COMM NOTE
"Barry Mckeon (62 y.o. Male)     Date of Birth Social Security Number Address Home Phone MRN    1955  [id number]  [address]   [place] [address] [phone] [id number]    Nondenominational Marital Status          None        Admission Date Admission Type Admitting Provider Attending Provider Department, Room/Bed    18 Urgent Echendu, Michael SCHULTE MD  [address], [facility]/1    Discharge Date Discharge Disposition Discharge Destination        2018 Home or Self Care             Attending Provider: (none)    Allergies:  No Known Allergies    Isolation:  None   Infection:  None   Code Status:  Prior    Ht:  175.3 cm (69\")   Wt:  58.1 kg (128 lb)    Admission Cmt:  None   Principal Problem:  None                Active Insurance as of 2018     Primary Coverage     Payor Plan Insurance Group Employer/Plan Group    ANTHEM MEDICAID ANTHEM MEDICAID [id number]     Payor Plan Address Payor Plan Phone Number Effective From Effective To    [address] [address] [phone] 10/1/2016     [place] [address]       Subscriber Name Subscriber Birth Date Member ID       BARRY MCKEON 1955 [id number]                 Emergency Contacts      (Rel.) Home Phone Work Phone Mobile Phone    Nersi Troncoso (Daughter) [phone] -- --               Discharge Summary      Onur Paulino MD at 2018 12:16 PM           [address] [address]  T - [phone]     DISCHARGE SUMMARY         PATIENT  DEMOGRAPHICS   PATIENT NAME: Barry Mckeon                      PHYSICIAN: Onur Paulino MD  : 1955  MRN: [id number]    ADMISSION/DISCHARGE INFO   ADMISSION DATE: 2018   DISCHARGE DATE: 2018    ADMISSION DIAGNOSES: COPD (chronic obstructive pulmonary disease) [J44.9]  DISCHARGE DIAGNOSES:     Active Problems:    Tobacco dependence syndrome    Chronic respiratory failure with " hypercapnia    COPD (chronic obstructive pulmonary disease)      SERVICE:  Medicine       CONSULTS   Consult Orders (all)     None          PROCEDURES     Imaging Results (last 24 hours)     ** No results found for the last 24 hours. **          Xr Chest Pa & Lateral    Result Date: 1/19/2018  Narrative: Chest x-ray PA and lateral CLINICAL INDICATION: Shortness of breath. COPD exacerbation COMPARISON: Chest x-ray March 10, 2015 FINDINGS: Cardiac silhouette is normal in size. Pulmonary vascularity is unremarkable. Flattening both diaphragms and increase in the retrosternal airspace indicating air trapping, COPD. Emphysematous changes especially in both upper lobes. Numerous benign calcified granulomata throughout both lung fields.     Impression: CONCLUSION: Flattening both diaphragms and increase in the retrosternal airspace indicating air trapping, COPD. Emphysematous changes especially both upper lobes. Numerous benign calcified granulomata in both lung fields without significant changes since prior exam. No evidence of active disease. Electronically signed by:  Eyal Hou MD  1/19/2018 5:03 PM CST Workstation: [id number]      HISTORY OF PRESENT ILLNESS   Kermit Corley is a 62 y.o. male with a history of O2 dependent COPD and nicotine dependence who was brought in as a direct admission from the office of Dr. Lee secondary to acute exacerbation of COPD.  Patient has had progressively worsening shortness of breath associated with a cough productive of green sputum within the past 1-2 weeks.  Patient has been seen and [facility] emergency Department 4 days ago and was given outpatient treatment.  Despite the outpatient treatment including antimicrobial patient continued to remain symptomatic.     DIAGNOSTIC DATA   Labs   Images     HOSPITAL COURSE   Patient was admitted to the medical floor, he was started on IV antibiotic and steroid.  He was also provided with a nebulizer  bronchodilator.  Patient uses home oxygen which was continued while his stay in the hospital.  Patient symptoms improved significant bili.  His breathing improved significant.  Patient was counseled regarding his smoking cessation however he continue to persist to smoke.  Patient is urged not to smoke while he has oxygen on.  Patient understand that not to smoke while he is using oxygen.  Patient is reminded to complete the course of antibiotic and steroid.  Patient is to follow-up with his PCP or come back to ER if his symptom gets any worse.  Patient will be going home with transitional home health.    Physical Exam   Constitutional: He is oriented to person, place, and time. He appears well-developed.   HENT:   Head: Normocephalic.   Eyes: EOM are normal. Pupils are equal, round, and reactive to light.   Neck: Normal range of motion.   Cardiovascular: Normal rate, regular rhythm and normal heart sounds.    Pulmonary/Chest: Effort normal and breath sounds normal.   Abdominal: Soft. Bowel sounds are normal.   Musculoskeletal: Normal range of motion.   Neurological: He is alert and oriented to person, place, and time.   Skin: Skin is warm.   Psychiatric: He has a normal mood and affect. His behavior is normal.   Vitals reviewed.         DISCHARGE CONDITION   Stable    DISPOSITION   To home with home health      DISCHARGE MEDICATIONS      Kermit Corley   Home Medication Instructions PUMA:[id number]    Printed on:01/23/18 1476   Medication Information                      albuterol (PROVENTIL HFA;VENTOLIN HFA) 108 (90 BASE) MCG/ACT inhaler  Inhale 2 puffs Every 6 (Six) Hours As Needed for Wheezing or Shortness of Air.             albuterol (PROVENTIL) (2.5 MG/3ML) 0.083% nebulizer solution  USE ONE VIAL IN NEBULIZER EVERY 4 HOURS AS NEEDED FOR WHEEZING             benzonatate (TESSALON PERLES) 100 MG capsule  Take 1 capsule by mouth 3 (Three) Times a Day As Needed for Cough.             ipratropium  (ATROVENT) 0.02 % nebulizer solution  FOUR TIMES DAILY             IPRATROPIUM-ALBUTEROL IN  Inhale 3 mL every 4 (four) hours as needed.             levoFLOXacin (LEVAQUIN) 500 MG tablet  Take 1 tablet by mouth Daily.             montelukast (SINGULAIR) 10 MG tablet  TAKE ONE TABLET BY MOUTH EVERY NIGHT             nicotine (NICODERM CQ) 7 MG/24HR patch  DAILY             predniSONE (DELTASONE) 10 MG tablet  Take 4 tablets by mouth Daily With Breakfast. Take 2 tablets for 2 days, take 1 tablet for 2 days, take 1/2 tablet for 2 days.             Respiratory Therapy Supplies (NEBULIZER) device  As needed for inhalation prn             tiotropium (SPIRIVA HANDIHALER) 18 MCG per inhalation capsule  Place 1 capsule into inhaler and inhale Daily.             traZODone (DESYREL) 50 MG tablet  50 mg Every Night.                   INSTRUCTIONS   Activity:   Activity Instructions     Discharge Activity                     Diet: regular     Special Instructions: Patient instructed to call M.D. or return to ED with worsening shortness of breath, chest pain, fever greater than 100.4°F or any other medical concerns.    FOLLOW UP   Additional Instructions for the Follow-ups that You Need to Schedule     Ambulatory Referral to Home Health    As directed    Face to Face Visit Date:  1/23/2018    Follow-up Provider for Plan of Care?:  I treated the patient in an acute care facility and will not continue treatment after discharge.    Follow-up Provider:  MARIBEL RAMON [[id number]]    Reason/Clinical Findings:  transitional    Describe mobility limitations that make leaving home difficult:  transitional    Nursing/Therapeutic Services Requested:  Other (transitional )    Frequency:  1 Week 1                 Follow-up Information     Follow up with Maribel Ramon MD Follow up in 3 day(s).    Specialty:  Family Medicine    Contact information:    [address] [place] [address] [place] [address] [phone]          Follow up with  [facility] .    Specialty:  Home Health Services    Contact information:    [address] [place] [address]  [phone]         PENDING TEST RESULTS AT DISCHARGE      TIME   Time: 39 minutes were spent planning this discharge.                      This document has been electronically signed by Onur Paulino MD on January 23, 2018 6:26 PM              Electronically signed by Onur Paulino MD at 1/23/2018  6:26 PM

## 2018-01-26 DIAGNOSIS — J44.9 CHRONIC OBSTRUCTIVE PULMONARY DISEASE, UNSPECIFIED COPD TYPE (HCC): ICD-10-CM

## 2018-01-26 RX ORDER — TIOTROPIUM BROMIDE 18 UG/1
CAPSULE ORAL; RESPIRATORY (INHALATION)
Qty: 30 CAPSULE | Refills: 5 | Status: SHIPPED | OUTPATIENT
Start: 2018-01-26 | End: 2019-04-29

## 2018-01-30 ENCOUNTER — OFFICE VISIT (OUTPATIENT)
Dept: FAMILY MEDICINE CLINIC | Facility: CLINIC | Age: 63
End: 2018-01-30

## 2018-01-30 VITALS
BODY MASS INDEX: 18.96 KG/M2 | WEIGHT: 128 LBS | TEMPERATURE: 97.5 F | SYSTOLIC BLOOD PRESSURE: 118 MMHG | DIASTOLIC BLOOD PRESSURE: 70 MMHG | HEIGHT: 69 IN | OXYGEN SATURATION: 94 % | HEART RATE: 106 BPM

## 2018-01-30 DIAGNOSIS — J44.1 COPD WITH ACUTE EXACERBATION (HCC): ICD-10-CM

## 2018-01-30 DIAGNOSIS — B37.0 ORAL THRUSH: ICD-10-CM

## 2018-01-30 DIAGNOSIS — F51.01 PRIMARY INSOMNIA: ICD-10-CM

## 2018-01-30 DIAGNOSIS — R60.0 BILATERAL EDEMA OF LOWER EXTREMITY: ICD-10-CM

## 2018-01-30 DIAGNOSIS — J96.12 CHRONIC RESPIRATORY FAILURE WITH HYPERCAPNIA (HCC): ICD-10-CM

## 2018-01-30 DIAGNOSIS — Z09 HOSPITAL DISCHARGE FOLLOW-UP: Primary | ICD-10-CM

## 2018-01-30 DIAGNOSIS — N40.1 BENIGN PROSTATIC HYPERPLASIA WITH URINARY FREQUENCY: ICD-10-CM

## 2018-01-30 DIAGNOSIS — F17.200 TOBACCO DEPENDENCE SYNDROME: ICD-10-CM

## 2018-01-30 DIAGNOSIS — R35.0 BENIGN PROSTATIC HYPERPLASIA WITH URINARY FREQUENCY: ICD-10-CM

## 2018-01-30 PROBLEM — Z72.0 TOBACCO ABUSE: Status: RESOLVED | Noted: 2017-12-13 | Resolved: 2018-01-30

## 2018-01-30 PROBLEM — R60.9 EDEMA: Status: RESOLVED | Noted: 2017-12-13 | Resolved: 2018-01-30

## 2018-01-30 PROBLEM — J18.9 PNEUMONIA: Status: RESOLVED | Noted: 2017-12-13 | Resolved: 2018-01-30

## 2018-01-30 PROBLEM — R07.9 CHEST PAIN: Status: RESOLVED | Noted: 2017-12-13 | Resolved: 2018-01-30

## 2018-01-30 PROBLEM — R04.2 HEMOPTYSIS: Status: RESOLVED | Noted: 2017-12-13 | Resolved: 2018-01-30

## 2018-01-30 PROBLEM — J93.9 PNEUMOTHORAX, RIGHT: Status: RESOLVED | Noted: 2017-12-13 | Resolved: 2018-01-30

## 2018-01-30 PROBLEM — Z46.82 ENCOUNTER FOR CHEST TUBE PLACEMENT: Status: RESOLVED | Noted: 2017-12-13 | Resolved: 2018-01-30

## 2018-01-30 PROBLEM — A41.9 SEPSIS (HCC): Status: RESOLVED | Noted: 2017-12-13 | Resolved: 2018-01-30

## 2018-01-30 PROBLEM — J96.90 RESPIRATORY FAILURE (HCC): Status: RESOLVED | Noted: 2017-12-13 | Resolved: 2018-01-30

## 2018-01-30 PROCEDURE — 99214 OFFICE O/P EST MOD 30 MIN: CPT | Performed by: FAMILY MEDICINE

## 2018-01-30 PROCEDURE — 96372 THER/PROPH/DIAG INJ SC/IM: CPT | Performed by: FAMILY MEDICINE

## 2018-01-30 RX ORDER — TAMSULOSIN HYDROCHLORIDE 0.4 MG/1
1 CAPSULE ORAL NIGHTLY
Qty: 90 CAPSULE | Refills: 1 | Status: SHIPPED | OUTPATIENT
Start: 2018-01-30 | End: 2019-07-09

## 2018-01-30 RX ORDER — DEXTROMETHORPHAN HYDROBROMIDE AND PROMETHAZINE HYDROCHLORIDE 15; 6.25 MG/5ML; MG/5ML
5 SYRUP ORAL 4 TIMES DAILY PRN
Qty: 473 ML | Refills: 0 | Status: SHIPPED | OUTPATIENT
Start: 2018-01-30 | End: 2018-04-05

## 2018-01-30 RX ORDER — NICOTINE 21 MG/24HR
1 PATCH, TRANSDERMAL 24 HOURS TRANSDERMAL EVERY 24 HOURS
Qty: 30 PATCH | Refills: 0 | Status: SHIPPED | OUTPATIENT
Start: 2018-01-30 | End: 2019-07-09

## 2018-01-30 RX ORDER — METHYLPREDNISOLONE ACETATE 80 MG/ML
80 INJECTION, SUSPENSION INTRA-ARTICULAR; INTRALESIONAL; INTRAMUSCULAR; SOFT TISSUE ONCE
Status: COMPLETED | OUTPATIENT
Start: 2018-01-30 | End: 2018-01-30

## 2018-01-30 RX ORDER — AMITRIPTYLINE HYDROCHLORIDE 50 MG/1
50 TABLET, FILM COATED ORAL NIGHTLY
Qty: 60 TABLET | Refills: 0 | Status: SHIPPED | OUTPATIENT
Start: 2018-01-30 | End: 2018-06-04 | Stop reason: SDUPTHER

## 2018-01-30 RX ADMIN — METHYLPREDNISOLONE ACETATE 80 MG: 80 INJECTION, SUSPENSION INTRA-ARTICULAR; INTRALESIONAL; INTRAMUSCULAR; SOFT TISSUE at 11:54

## 2018-01-30 NOTE — PROGRESS NOTES
Subjective   Chief Complaint   Patient presents with   • hospital follow up     [facility], released Jan 23rd   • Foot Swelling     both     Kermit Corley is a 62 y.o. male.   hospital follow up ([facility], released Jan 23rd) and Foot Swelling (both)    History of Present Illness     Recheck after hospitalization at [facility]  Admitted 1/19/18 and discharged 1/23/18  Admitted for copd exacerbation  cxr - flattening of diaphragm and emphysema  Patient is oxygen dependent - 2LNC  Limited with activity by lung function  Discharged home with 2 days of levaquin, prednisone pack  Completed antibiotic and steroid pack  Complaining of lower extremity edema, productive cough, shortness of breath, shaking  Denies fever    Using spiriva, nebulizer, albuterol inhaler  Trial of anoro by Dr Rosa Lee started him on home oxygen at night due to chronic issues with hypoxia, coughing episodes    Worried about blood pressure and heart rate    Complaints of polyuria, weak stream, frequency, dribbling, incomplete empyting    C/o lower extremity edema    Tobacco dependence - remains uncontrolled    The following portions of the patient's history were reviewed and updated as appropriate: allergies, current medications, past family history, past medical history, past social history, past surgical history and problem list.    Review of Systems   Constitutional: Positive for fatigue. Negative for appetite change, chills and fever.   HENT: Negative for congestion, ear pain, rhinorrhea and sore throat.         Oral thrush   Eyes: Negative for pain.   Respiratory: Positive for cough, chest tightness and shortness of breath.    Cardiovascular: Positive for leg swelling. Negative for chest pain and palpitations.   Gastrointestinal: Negative for abdominal pain, constipation, diarrhea and nausea.   Genitourinary: Positive for difficulty urinating, frequency and urgency. Negative for dysuria.        Weak stream  Dribbling  "of urine  Incomplete emptying   Musculoskeletal: Negative for back pain, joint swelling and neck pain.   Skin: Negative for rash.   Neurological: Positive for tremors and weakness. Negative for dizziness and headaches.       Objective   /70  Pulse 106  Temp 97.5 °F (36.4 °C)  Ht 175.3 cm (69.02\")  Wt 58.1 kg (128 lb)  SpO2 94%  BMI 18.89 kg/m2  Physical Exam   Constitutional: He is oriented to person, place, and time. He appears well-developed and well-nourished.   HENT:   Head: Normocephalic and atraumatic.   White discoloration of tongue   Eyes: Pupils are equal, round, and reactive to light.   Neck: Normal range of motion. Neck supple.   Cardiovascular: Regular rhythm and normal heart sounds.  Tachycardia present.    Pulmonary/Chest: He is in respiratory distress. He has decreased breath sounds. He has no wheezes. He has no rales.   Abdominal: Soft. Bowel sounds are normal.   Musculoskeletal:   Ambulating with cane   Neurological: He is alert and oriented to person, place, and time.   Skin: Skin is warm and dry.   Psychiatric: He has a normal mood and affect.   Nursing note and vitals reviewed.      Assessment/Plan   Problems Addressed this Visit        Respiratory    Chronic respiratory failure with hypercapnia       Other    Tobacco dependence syndrome    Relevant Medications    nicotine (NICODERM CQ) 14 MG/24HR patch    Insomnia    Relevant Medications    amitriptyline (ELAVIL) 50 MG tablet    Bilateral edema of lower extremity      Other Visit Diagnoses     Hospital discharge follow-up    -  Primary    Oral thrush        Relevant Medications    nystatin (MYCOSTATIN) 464491 UNIT/ML suspension    COPD with acute exacerbation        Relevant Medications    promethazine-dextromethorphan (PROMETHAZINE-DM) 6.25-15 MG/5ML syrup    methylPREDNISolone acetate (DEPO-medrol) injection 80 mg (Completed) (Start on 1/30/2018 12:15 PM)    Benign prostatic hyperplasia with urinary frequency            Reviewed " hospital records    depomedrol IM today  Updated medication list  Provided anoro samples  Stop tessalon perles  Start cough suppressant    Start nicoderm patches for smoking cessation  Counseled on importance of cessation    Start nystatin for oral thrush    Start elavil for insomnia  Titrate as directed  Stop trazodone    Start flomax for bph    Scheduled a follow up with pulmonology    Recheck in 4 weeks

## 2018-02-01 ENCOUNTER — OFFICE VISIT (OUTPATIENT)
Dept: PULMONOLOGY | Facility: CLINIC | Age: 63
End: 2018-02-01

## 2018-02-01 VITALS
OXYGEN SATURATION: 90 % | HEART RATE: 117 BPM | HEIGHT: 60 IN | BODY MASS INDEX: 25.13 KG/M2 | SYSTOLIC BLOOD PRESSURE: 120 MMHG | WEIGHT: 128 LBS | DIASTOLIC BLOOD PRESSURE: 80 MMHG

## 2018-02-01 DIAGNOSIS — J41.8 MIXED SIMPLE AND MUCOPURULENT CHRONIC BRONCHITIS (HCC): ICD-10-CM

## 2018-02-01 DIAGNOSIS — J43.1 PANLOBULAR EMPHYSEMA (HCC): Primary | ICD-10-CM

## 2018-02-01 PROCEDURE — 99213 OFFICE O/P EST LOW 20 MIN: CPT | Performed by: INTERNAL MEDICINE

## 2018-02-01 NOTE — PROGRESS NOTES
"This gentleman has COPD and hypoxemia.  He was hospitalized recently for respiratory failure.  He is still smoking.  Breathing has improved.  He does not take bronchodilators on a regular basis.    ROS    Constitutional-no night sweats weight loss headaches  GI no abdominal pain nausea or diarrhea  Neuro no seizure or neurologic deficits  Musculoskeletal no deformity or joint pain   no dysuria or hematuria  Skin no rash or other lesions  All other systems reviewed and were negative except for the above.      Physical Exam  /80  Pulse 117  Ht 69 cm (27.17\")  Wt 58.1 kg (128 lb)  SpO2 90%  .95 kg/m2  Vital signs as above  Pupils equally round and reactive to light and accommodation, neck no JVD or adenopathy.  Cardiovascular regular rhythm and rate no murmur or gallop.  Abdomen soft no organomegaly tenderness.  Extremities no clubbing cyanosis or edema.  No cervical adenopathy.  No skin rash.  Neurologic good strength bilaterally without deficits  Lungs reveal diminished breath sounds, respiratory rate is 24, no wheezes    Impression advanced COPD    Recommendations discourage smoking, a trial of bevespi, return in 2 months          This document has been electronically signed by Aba Lee MD on February 1, 2018 2:24 PM      "

## 2018-02-15 ENCOUNTER — DOCUMENTATION (OUTPATIENT)
Dept: FAMILY MEDICINE CLINIC | Facility: CLINIC | Age: 63
End: 2018-02-15

## 2018-02-15 NOTE — PROGRESS NOTES
[employer] has denied PA for nicotine TD  They have returned as plan exclusion for this insurance.

## 2018-03-07 ENCOUNTER — OFFICE VISIT (OUTPATIENT)
Dept: FAMILY MEDICINE CLINIC | Facility: CLINIC | Age: 63
End: 2018-03-07

## 2018-03-07 VITALS
OXYGEN SATURATION: 92 % | HEART RATE: 118 BPM | SYSTOLIC BLOOD PRESSURE: 122 MMHG | HEIGHT: 69 IN | DIASTOLIC BLOOD PRESSURE: 76 MMHG

## 2018-03-07 DIAGNOSIS — J44.1 COPD EXACERBATION (HCC): Primary | ICD-10-CM

## 2018-03-07 DIAGNOSIS — R06.03 RESPIRATORY DISTRESS: ICD-10-CM

## 2018-03-07 PROCEDURE — 99213 OFFICE O/P EST LOW 20 MIN: CPT | Performed by: FAMILY MEDICINE

## 2018-03-21 ENCOUNTER — OFFICE VISIT (OUTPATIENT)
Dept: FAMILY MEDICINE CLINIC | Facility: CLINIC | Age: 63
End: 2018-03-21

## 2018-03-21 VITALS
BODY MASS INDEX: 18.96 KG/M2 | OXYGEN SATURATION: 92 % | HEART RATE: 96 BPM | DIASTOLIC BLOOD PRESSURE: 74 MMHG | SYSTOLIC BLOOD PRESSURE: 132 MMHG | HEIGHT: 69 IN | TEMPERATURE: 98.4 F | WEIGHT: 128 LBS

## 2018-03-21 DIAGNOSIS — J44.9 COPD, SEVERE (HCC): ICD-10-CM

## 2018-03-21 DIAGNOSIS — Z09 HOSPITAL DISCHARGE FOLLOW-UP: Primary | ICD-10-CM

## 2018-03-21 DIAGNOSIS — F17.200 TOBACCO DEPENDENCE SYNDROME: ICD-10-CM

## 2018-03-21 PROCEDURE — 99214 OFFICE O/P EST MOD 30 MIN: CPT | Performed by: FAMILY MEDICINE

## 2018-03-21 PROCEDURE — 96372 THER/PROPH/DIAG INJ SC/IM: CPT | Performed by: FAMILY MEDICINE

## 2018-03-21 RX ORDER — TRIAMCINOLONE ACETONIDE 40 MG/ML
80 INJECTION, SUSPENSION INTRA-ARTICULAR; INTRAMUSCULAR ONCE
Status: COMPLETED | OUTPATIENT
Start: 2018-03-21 | End: 2018-03-21

## 2018-03-21 RX ORDER — ALBUTEROL SULFATE 2.5 MG/3ML
2.5 SOLUTION RESPIRATORY (INHALATION) EVERY 4 HOURS PRN
Qty: 180 VIAL | Refills: 1 | Status: SHIPPED | OUTPATIENT
Start: 2018-03-21 | End: 2018-05-21 | Stop reason: SDUPTHER

## 2018-03-21 RX ORDER — GUAIFENESIN AND DEXTROMETHORPHAN HYDROBROMIDE 600; 30 MG/1; MG/1
1 TABLET, EXTENDED RELEASE ORAL 2 TIMES DAILY PRN
Qty: 14 TABLET | Refills: 0 | Status: SHIPPED | OUTPATIENT
Start: 2018-03-21 | End: 2018-04-05

## 2018-03-21 RX ORDER — DOXYCYCLINE HYCLATE 100 MG
100 TABLET ORAL 2 TIMES DAILY
Qty: 20 TABLET | Refills: 0 | Status: SHIPPED | OUTPATIENT
Start: 2018-03-21 | End: 2018-03-31

## 2018-03-21 RX ADMIN — TRIAMCINOLONE ACETONIDE 80 MG: 40 INJECTION, SUSPENSION INTRA-ARTICULAR; INTRAMUSCULAR at 11:33

## 2018-03-21 NOTE — PROGRESS NOTES
"Subjective   Chief Complaint   Patient presents with   • Follow-up     Hospital      [facility] Pravin Corley is a 62 y.o. male.   Follow-up (Hospital )    History of Present Illness     Follow up from hospitalization for copd exacerbation  He was directed to the hospital after his last visit due to respiratory distress  He was admitted 3/07 and discharged 3/14  He was provided a steroid taper, omnicef, azithromycin, and diflucan all of which the patient completed  Patient believes his symptoms have gradually improved but continues to have a persistent cough  Asking for a repeat steroid injection and antibiotics  Complaining of shortness of breath, productive cough, wheezing, and weakness    Asking for an adjustment with his oxygen supplies  He has trouble transporting his oxygen tank with him when he leaves his home  He is asking for a smaller more portable device  oximetry level recorded in the office today is without oxygen and patient at rest    The following portions of the patient's history were reviewed and updated as appropriate: allergies, current medications, past family history, past medical history, past social history, past surgical history and problem list.    Review of Systems   Constitutional: Negative for appetite change, chills, fatigue and fever.   HENT: Negative for congestion, ear pain, rhinorrhea and sore throat.    Eyes: Negative for pain.   Respiratory: Positive for cough, chest tightness, shortness of breath and wheezing.    Cardiovascular: Negative for chest pain and palpitations.   Gastrointestinal: Negative for abdominal pain, constipation, diarrhea and nausea.   Genitourinary: Negative for dysuria.   Musculoskeletal: Negative for back pain, joint swelling and neck pain.   Skin: Negative for rash.   Neurological: Negative for dizziness and headaches.       Objective   /74   Pulse 96   Temp 98.4 °F (36.9 °C)   Ht 175.3 cm (69.02\")   Wt 58.1 kg (128 lb)   SpO2 92%   BMI 18.89 " kg/m²   Physical Exam   Constitutional: He is oriented to person, place, and time. He appears well-developed and well-nourished.   HENT:   Head: Normocephalic and atraumatic.   Eyes: Pupils are equal, round, and reactive to light.   Neck: Normal range of motion. Neck supple.   Cardiovascular: Normal rate, regular rhythm and normal heart sounds.    Pulmonary/Chest: No respiratory distress. He has decreased breath sounds. He has no rales.   Abdominal: Soft. Bowel sounds are normal.   Musculoskeletal: Normal range of motion.   Neurological: He is alert and oriented to person, place, and time.   Skin: Skin is warm and dry.   Psychiatric: He has a normal mood and affect.   Nursing note and vitals reviewed.      Assessment/Plan   Problems Addressed this Visit        Respiratory    COPD, severe    Relevant Medications    albuterol (PROVENTIL) (2.5 MG/3ML) 0.083% nebulizer solution    guaifenesin-dextromethorphan (MUCINEX DM)  MG tablet sustained-release 12 hour tablet    triamcinolone acetonide (KENALOG-40) injection 80 mg (Completed) (Start on 3/21/2018 12:00 PM)       Other    Tobacco dependence syndrome      Other Visit Diagnoses     Hospital discharge follow-up    -  Primary        I advised the patient of the risks in continuing to use tobacco, and I provided this patient with smoking cessation educational materials.  During this visit, I spent < 3 minutes counseling the patient regarding smoking cessation.    Kenalog IM  Start mucinex DM  Start doxycycline  Recheck in 2 weeks

## 2018-04-04 ENCOUNTER — OFFICE VISIT (OUTPATIENT)
Dept: PULMONOLOGY | Facility: CLINIC | Age: 63
End: 2018-04-04

## 2018-04-04 VITALS
OXYGEN SATURATION: 90 % | WEIGHT: 128.1 LBS | HEIGHT: 69 IN | HEART RATE: 102 BPM | SYSTOLIC BLOOD PRESSURE: 128 MMHG | DIASTOLIC BLOOD PRESSURE: 76 MMHG | BODY MASS INDEX: 18.97 KG/M2

## 2018-04-04 DIAGNOSIS — R09.02 HYPOXEMIA: ICD-10-CM

## 2018-04-04 DIAGNOSIS — J43.1 PANLOBULAR EMPHYSEMA (HCC): Primary | ICD-10-CM

## 2018-04-04 PROBLEM — F41.9 ANXIETY: Status: ACTIVE | Noted: 2018-04-04

## 2018-04-04 PROBLEM — J44.9 ASTHMA-CHRONIC OBSTRUCTIVE PULMONARY DISEASE OVERLAP SYNDROME (HCC): Status: ACTIVE | Noted: 2018-04-04

## 2018-04-04 PROBLEM — E87.6 HYPOKALEMIA: Status: ACTIVE | Noted: 2018-04-04

## 2018-04-04 PROBLEM — F43.21 GRIEF: Status: ACTIVE | Noted: 2018-04-04

## 2018-04-04 PROCEDURE — 96372 THER/PROPH/DIAG INJ SC/IM: CPT | Performed by: INTERNAL MEDICINE

## 2018-04-04 PROCEDURE — 99214 OFFICE O/P EST MOD 30 MIN: CPT | Performed by: INTERNAL MEDICINE

## 2018-04-04 RX ORDER — METHYLPREDNISOLONE ACETATE 40 MG/ML
80 INJECTION, SUSPENSION INTRA-ARTICULAR; INTRALESIONAL; INTRAMUSCULAR; SOFT TISSUE ONCE
Status: COMPLETED | OUTPATIENT
Start: 2018-04-04 | End: 2018-04-04

## 2018-04-04 RX ADMIN — METHYLPREDNISOLONE ACETATE 80 MG: 40 INJECTION, SUSPENSION INTRA-ARTICULAR; INTRALESIONAL; INTRAMUSCULAR; SOFT TISSUE at 11:04

## 2018-04-04 NOTE — PROGRESS NOTES
"This gentleman has COPD probably emphysema with persistent tobacco use.  He complains of increasing shortness of breath and nasal congestion for several days.  He is not producing purulent sputum.  He thinks that bevespi inhaler has helped some    ROS    Constitutional-no night sweats weight loss headaches  GI no abdominal pain nausea or diarrhea  Neuro no seizure or neurologic deficits  Musculoskeletal no deformity or joint pain   no dysuria or hematuria  Skin no rash or other lesions  All other systems reviewed and were negative except for the above.      Physical Exam  /76 (BP Location: Left arm, Patient Position: Sitting, Cuff Size: Adult)   Pulse 102   Ht 175.3 cm (69.02\")   Wt 58.1 kg (128 lb 1.6 oz)   SpO2 90%   BMI 18.91 kg/m²   Vital signs as above  Pupils equally round and reactive to light and accommodation, neck no JVD or adenopathy.  Cardiovascular regular rhythm and rate no murmur or gallop.  Abdomen soft no organomegaly tenderness.  Extremities no clubbing cyanosis or edema.  No cervical adenopathy.  No skin rash.  Neurologic good strength bilaterally without deficits  Dyspneic white male nose is congested lungs reveal scattered rhonchi    Impression COPD with mild exacerbation, rhinitis    Plan Depo-Medrol, refill bevespi, return in 2 months          This document has been electronically signed by Aba Lee MD on April 4, 2018 10:53 AM      "

## 2018-04-05 ENCOUNTER — OFFICE VISIT (OUTPATIENT)
Dept: FAMILY MEDICINE CLINIC | Facility: CLINIC | Age: 63
End: 2018-04-05

## 2018-04-05 VITALS
DIASTOLIC BLOOD PRESSURE: 80 MMHG | WEIGHT: 128 LBS | BODY MASS INDEX: 18.96 KG/M2 | SYSTOLIC BLOOD PRESSURE: 142 MMHG | HEART RATE: 111 BPM | TEMPERATURE: 97.4 F | OXYGEN SATURATION: 96 % | HEIGHT: 69 IN

## 2018-04-05 DIAGNOSIS — Z12.2 ENCOUNTER FOR SCREENING FOR LUNG CANCER: ICD-10-CM

## 2018-04-05 DIAGNOSIS — J43.1 PANLOBULAR EMPHYSEMA (HCC): ICD-10-CM

## 2018-04-05 DIAGNOSIS — F17.200 TOBACCO DEPENDENCE SYNDROME: ICD-10-CM

## 2018-04-05 DIAGNOSIS — J01.00 ACUTE NON-RECURRENT MAXILLARY SINUSITIS: Primary | ICD-10-CM

## 2018-04-05 PROCEDURE — 94640 AIRWAY INHALATION TREATMENT: CPT | Performed by: FAMILY MEDICINE

## 2018-04-05 PROCEDURE — 99214 OFFICE O/P EST MOD 30 MIN: CPT | Performed by: FAMILY MEDICINE

## 2018-04-05 RX ORDER — PREDNISONE 20 MG/1
20 TABLET ORAL 2 TIMES DAILY
Qty: 10 TABLET | Refills: 0 | Status: SHIPPED | OUTPATIENT
Start: 2018-04-05 | End: 2018-05-10

## 2018-04-05 RX ORDER — IPRATROPIUM BROMIDE AND ALBUTEROL SULFATE 2.5; .5 MG/3ML; MG/3ML
3 SOLUTION RESPIRATORY (INHALATION) ONCE
Status: COMPLETED | OUTPATIENT
Start: 2018-04-05 | End: 2018-04-05

## 2018-04-05 RX ORDER — CETIRIZINE HYDROCHLORIDE 10 MG/1
10 TABLET ORAL DAILY
Qty: 30 TABLET | Refills: 0 | Status: SHIPPED | OUTPATIENT
Start: 2018-04-05 | End: 2018-05-10

## 2018-04-05 RX ADMIN — IPRATROPIUM BROMIDE AND ALBUTEROL SULFATE 3 ML: 2.5; .5 SOLUTION RESPIRATORY (INHALATION) at 11:33

## 2018-04-11 ENCOUNTER — TELEPHONE (OUTPATIENT)
Dept: FAMILY MEDICINE CLINIC | Facility: CLINIC | Age: 63
End: 2018-04-11

## 2018-04-11 NOTE — TELEPHONE ENCOUNTER
----- Message from Maribel Barclay MD sent at 4/11/2018  7:54 AM CDT -----  Lung cancer screening is negative.  Repeat next year.

## 2018-05-10 ENCOUNTER — OFFICE VISIT (OUTPATIENT)
Dept: FAMILY MEDICINE CLINIC | Facility: CLINIC | Age: 63
End: 2018-05-10

## 2018-05-10 VITALS
HEART RATE: 88 BPM | DIASTOLIC BLOOD PRESSURE: 64 MMHG | WEIGHT: 132 LBS | HEIGHT: 69 IN | OXYGEN SATURATION: 97 % | BODY MASS INDEX: 19.55 KG/M2 | SYSTOLIC BLOOD PRESSURE: 124 MMHG

## 2018-05-10 DIAGNOSIS — F17.200 TOBACCO DEPENDENCE SYNDROME: ICD-10-CM

## 2018-05-10 DIAGNOSIS — Z09 HOSPITAL DISCHARGE FOLLOW-UP: Primary | ICD-10-CM

## 2018-05-10 DIAGNOSIS — J44.1 CHRONIC OBSTRUCTIVE PULMONARY DISEASE WITH ACUTE EXACERBATION (HCC): ICD-10-CM

## 2018-05-10 PROCEDURE — 99214 OFFICE O/P EST MOD 30 MIN: CPT | Performed by: FAMILY MEDICINE

## 2018-05-10 PROCEDURE — 96372 THER/PROPH/DIAG INJ SC/IM: CPT | Performed by: FAMILY MEDICINE

## 2018-05-10 RX ORDER — METHYLPREDNISOLONE ACETATE 80 MG/ML
80 INJECTION, SUSPENSION INTRA-ARTICULAR; INTRALESIONAL; INTRAMUSCULAR; SOFT TISSUE ONCE
Status: COMPLETED | OUTPATIENT
Start: 2018-05-10 | End: 2018-05-10

## 2018-05-10 RX ORDER — CEFTRIAXONE 1 G/1
1 INJECTION, POWDER, FOR SOLUTION INTRAMUSCULAR; INTRAVENOUS ONCE
Status: COMPLETED | OUTPATIENT
Start: 2018-05-10 | End: 2018-05-10

## 2018-05-10 RX ADMIN — METHYLPREDNISOLONE ACETATE 80 MG: 80 INJECTION, SUSPENSION INTRA-ARTICULAR; INTRALESIONAL; INTRAMUSCULAR; SOFT TISSUE at 10:22

## 2018-05-10 RX ADMIN — CEFTRIAXONE 1 G: 1 INJECTION, POWDER, FOR SOLUTION INTRAMUSCULAR; INTRAVENOUS at 10:22

## 2018-05-10 NOTE — PROGRESS NOTES
"Subjective   Chief Complaint   Patient presents with   • Follow-up     Hospital F/U [place]     Kermit Corley is a 63 y.o. male.   Follow-up (Hospital F/U [place])    History of Present Illness     Presents after a hospital discharge in [facility] 5/1 and discharged home the same night for bronchoscopy  Complaints today for productive cough, weakness, shortness of breath  Admits to having issues with his insurance and being without his medications  Has made an improvement on his smoking, using nicoderm patches  Asking for a disability parking plackard    The following portions of the patient's history were reviewed and updated as appropriate: allergies, current medications, past family history, past medical history, past social history, past surgical history and problem list.    Review of Systems   Constitutional: Negative for appetite change, chills, fatigue and fever.   HENT: Positive for congestion. Negative for ear pain, rhinorrhea and sore throat.    Eyes: Negative for pain.   Respiratory: Positive for cough, shortness of breath and wheezing.    Cardiovascular: Negative for chest pain and palpitations.   Gastrointestinal: Negative for abdominal pain, constipation, diarrhea and nausea.   Genitourinary: Negative for dysuria.   Musculoskeletal: Negative for back pain, joint swelling and neck pain.   Skin: Negative for rash.   Neurological: Positive for weakness. Negative for dizziness and headaches.       Objective   /64   Pulse 88   Ht 175.3 cm (69.02\")   Wt 59.9 kg (132 lb)   SpO2 97%   BMI 19.48 kg/m²   Physical Exam   Constitutional: He is oriented to person, place, and time. He appears well-developed and well-nourished.   HENT:   Head: Normocephalic and atraumatic.   Eyes: Pupils are equal, round, and reactive to light.   Neck: Normal range of motion. Neck supple.   Cardiovascular: Normal rate, regular rhythm and normal heart sounds.    Pulmonary/Chest: Effort normal. No respiratory " distress. He has decreased breath sounds. He has wheezes. He has no rales.   Abdominal: Soft. Bowel sounds are normal.   Musculoskeletal:   Ambulating with a cane   Neurological: He is alert and oriented to person, place, and time.   Skin: Skin is warm and dry.   Psychiatric: He has a normal mood and affect.   Nursing note and vitals reviewed.      Assessment/Plan   Problems Addressed this Visit        Respiratory    COPD (chronic obstructive pulmonary disease)    Relevant Medications    methylPREDNISolone acetate (DEPO-medrol) injection 80 mg (Completed) (Start on 5/10/2018 10:45 AM)    cefTRIAXone (ROCEPHIN) injection 1 g (Completed) (Start on 5/10/2018 10:45 AM)       Other    Tobacco dependence syndrome      Other Visit Diagnoses     Hospital discharge follow-up    -  Primary        Rocephin and depo-medrol IM    Samples provided for bystolic    Samples provided for spiriva  Instructed on use in the office    I advised the patient of the risks in continuing to use tobacco, and I provided this patient with smoking cessation educational materials.    During this visit, I spent <3 minutes counseling the patient regarding smoking cessation.    Recheck in 4 weeks

## 2018-05-21 DIAGNOSIS — J44.9 COPD, SEVERE (HCC): ICD-10-CM

## 2018-05-21 RX ORDER — ALBUTEROL SULFATE 2.5 MG/3ML
2.5 SOLUTION RESPIRATORY (INHALATION) EVERY 4 HOURS PRN
Qty: 180 VIAL | Refills: 1 | Status: SHIPPED | OUTPATIENT
Start: 2018-05-21 | End: 2018-06-06 | Stop reason: SDUPTHER

## 2018-06-04 ENCOUNTER — OFFICE VISIT (OUTPATIENT)
Dept: PULMONOLOGY | Facility: CLINIC | Age: 63
End: 2018-06-04

## 2018-06-04 VITALS
WEIGHT: 132.5 LBS | DIASTOLIC BLOOD PRESSURE: 60 MMHG | HEART RATE: 86 BPM | HEIGHT: 69 IN | SYSTOLIC BLOOD PRESSURE: 128 MMHG | BODY MASS INDEX: 19.62 KG/M2 | OXYGEN SATURATION: 92 %

## 2018-06-04 DIAGNOSIS — F17.200 TOBACCO DEPENDENCE SYNDROME: ICD-10-CM

## 2018-06-04 DIAGNOSIS — J44.9 COPD, SEVERE (HCC): Primary | ICD-10-CM

## 2018-06-04 DIAGNOSIS — F51.01 PRIMARY INSOMNIA: ICD-10-CM

## 2018-06-04 PROCEDURE — 99213 OFFICE O/P EST LOW 20 MIN: CPT | Performed by: INTERNAL MEDICINE

## 2018-06-04 PROCEDURE — 96372 THER/PROPH/DIAG INJ SC/IM: CPT | Performed by: INTERNAL MEDICINE

## 2018-06-04 RX ORDER — NITROGLYCERIN 0.4 MG/1
0.4 TABLET SUBLINGUAL
COMMUNITY
Start: 2018-06-01

## 2018-06-04 RX ORDER — AMITRIPTYLINE HYDROCHLORIDE 50 MG/1
TABLET, FILM COATED ORAL
Qty: 60 TABLET | Refills: 0 | Status: SHIPPED | OUTPATIENT
Start: 2018-06-04 | End: 2018-07-05 | Stop reason: SDUPTHER

## 2018-06-04 RX ORDER — NEBIVOLOL 2.5 MG/1
2.5 TABLET ORAL DAILY
COMMUNITY
Start: 2018-06-01

## 2018-06-04 RX ORDER — METHYLPREDNISOLONE ACETATE 40 MG/ML
80 INJECTION, SUSPENSION INTRA-ARTICULAR; INTRALESIONAL; INTRAMUSCULAR; SOFT TISSUE ONCE
Status: COMPLETED | OUTPATIENT
Start: 2018-06-04 | End: 2018-06-04

## 2018-06-04 RX ADMIN — METHYLPREDNISOLONE ACETATE 80 MG: 40 INJECTION, SUSPENSION INTRA-ARTICULAR; INTRALESIONAL; INTRAMUSCULAR; SOFT TISSUE at 14:13

## 2018-06-04 NOTE — PROGRESS NOTES
"This gentleman has emphysema and tobacco use and shortness of breath.  He has a little head congestion and dyspnea but is not producing purulent sputum.  He continues to take bronchodilators    ROS    Constitutional-no night sweats weight loss headaches  GI no abdominal pain nausea or diarrhea  Neuro no seizure or neurologic deficits  Musculoskeletal no deformity or joint pain   no dysuria or hematuria  Skin no rash or other lesions  All other systems reviewed and were negative except for the above.      Physical Exam  /60 (BP Location: Left arm, Patient Position: Sitting, Cuff Size: Adult)   Pulse 86   Ht 175.3 cm (69\")   Wt 60.1 kg (132 lb 8 oz)   SpO2 92%   BMI 19.57 kg/m²   Vital signs as above  Pupils equally round and reactive to light and accommodation, neck no JVD or adenopathy.  Cardiovascular regular rhythm and rate no murmur or gallop.  Abdomen soft no organomegaly tenderness.  Extremities no clubbing cyanosis or edema.  No cervical adenopathy.  No skin rash.  Neurologic good strength bilaterally without deficits  Dyspneic, white male lungs reveal prolonged expiration but no wheeze, nose is congested    Impression COPD with dyspnea, tobacco use    Plan discourage smoking, Depo-Medrol 2 cc IM, continue present medications, return in 2 months          This document has been electronically signed by Aba Lee MD on June 4, 2018 2:06 PM      "

## 2018-06-06 DIAGNOSIS — J44.9 COPD, SEVERE (HCC): ICD-10-CM

## 2018-06-06 RX ORDER — ALBUTEROL SULFATE 2.5 MG/3ML
2.5 SOLUTION RESPIRATORY (INHALATION) EVERY 4 HOURS PRN
Qty: 180 VIAL | Refills: 1 | Status: SHIPPED | OUTPATIENT
Start: 2018-06-06 | End: 2018-08-31 | Stop reason: SDUPTHER

## 2018-07-05 DIAGNOSIS — F51.01 PRIMARY INSOMNIA: ICD-10-CM

## 2018-07-06 RX ORDER — AMITRIPTYLINE HYDROCHLORIDE 50 MG/1
TABLET, FILM COATED ORAL
Qty: 60 TABLET | Refills: 5 | Status: SHIPPED | OUTPATIENT
Start: 2018-07-06 | End: 2018-12-31 | Stop reason: SDUPTHER

## 2018-08-09 ENCOUNTER — OFFICE VISIT (OUTPATIENT)
Dept: FAMILY MEDICINE CLINIC | Facility: CLINIC | Age: 63
End: 2018-08-09

## 2018-08-09 VITALS
DIASTOLIC BLOOD PRESSURE: 70 MMHG | OXYGEN SATURATION: 97 % | SYSTOLIC BLOOD PRESSURE: 114 MMHG | HEART RATE: 106 BPM | WEIGHT: 134.6 LBS | HEIGHT: 69 IN | BODY MASS INDEX: 19.93 KG/M2

## 2018-08-09 DIAGNOSIS — J44.1 ACUTE EXACERBATION OF CHRONIC OBSTRUCTIVE PULMONARY DISEASE (COPD) (HCC): Primary | ICD-10-CM

## 2018-08-09 DIAGNOSIS — F17.200 TOBACCO DEPENDENCE SYNDROME: ICD-10-CM

## 2018-08-09 PROCEDURE — 96372 THER/PROPH/DIAG INJ SC/IM: CPT | Performed by: FAMILY MEDICINE

## 2018-08-09 PROCEDURE — 99213 OFFICE O/P EST LOW 20 MIN: CPT | Performed by: FAMILY MEDICINE

## 2018-08-09 RX ORDER — LEVOFLOXACIN 500 MG/1
500 TABLET, FILM COATED ORAL DAILY
Qty: 10 TABLET | Refills: 0 | Status: SHIPPED | OUTPATIENT
Start: 2018-08-09 | End: 2018-08-19

## 2018-08-09 RX ORDER — METHYLPREDNISOLONE 4 MG/1
TABLET ORAL
Qty: 21 EACH | Refills: 0 | Status: SHIPPED | OUTPATIENT
Start: 2018-08-09 | End: 2018-08-15

## 2018-08-09 RX ORDER — METHYLPREDNISOLONE ACETATE 80 MG/ML
80 INJECTION, SUSPENSION INTRA-ARTICULAR; INTRALESIONAL; INTRAMUSCULAR; SOFT TISSUE ONCE
Status: COMPLETED | OUTPATIENT
Start: 2018-08-09 | End: 2018-08-09

## 2018-08-09 RX ORDER — DEXTROMETHORPHAN HYDROBROMIDE AND PROMETHAZINE HYDROCHLORIDE 15; 6.25 MG/5ML; MG/5ML
5 SYRUP ORAL 4 TIMES DAILY PRN
Qty: 240 ML | Refills: 0 | Status: SHIPPED | OUTPATIENT
Start: 2018-08-09 | End: 2018-12-06 | Stop reason: SDUPTHER

## 2018-08-09 RX ADMIN — METHYLPREDNISOLONE ACETATE 80 MG: 80 INJECTION, SUSPENSION INTRA-ARTICULAR; INTRALESIONAL; INTRAMUSCULAR; SOFT TISSUE at 14:53

## 2018-08-09 NOTE — PROGRESS NOTES
"Subjective   Chief Complaint   Patient presents with   • Cough   • Fatigue     Kermit Corley is a 63 y.o. male.   Cough and Fatigue    History of Present Illness     Presents today with shortness of breath  Associated with productive cough, sore throat, headache, congestion  Did a nebulizer at home around 2pm today  Symptoms started 8/5/18 but has been progressively worsening    The following portions of the patient's history were reviewed and updated as appropriate: allergies, current medications, past family history, past medical history, past social history, past surgical history and problem list.    Review of Systems   Constitutional: Negative for appetite change, chills, fatigue and fever.   HENT: Positive for congestion and sore throat. Negative for ear pain and rhinorrhea.    Eyes: Negative for pain.   Respiratory: Positive for cough, chest tightness, shortness of breath and wheezing.    Cardiovascular: Negative for chest pain and palpitations.   Gastrointestinal: Negative for abdominal pain, constipation, diarrhea and nausea.   Genitourinary: Negative for dysuria.   Musculoskeletal: Negative for back pain, joint swelling and neck pain.   Skin: Negative for rash.   Neurological: Positive for headaches. Negative for dizziness.       Objective   /70   Pulse 106   Ht 175.3 cm (69.02\")   Wt 61.1 kg (134 lb 9.6 oz)   SpO2 97%   BMI 19.87 kg/m²   Physical Exam   Constitutional: He is oriented to person, place, and time. He appears well-developed and well-nourished.   HENT:   Head: Normocephalic and atraumatic.   Eyes: Pupils are equal, round, and reactive to light.   Neck: Normal range of motion. Neck supple.   Cardiovascular: Normal rate, regular rhythm and normal heart sounds.    Pulmonary/Chest: He is in respiratory distress. He has decreased breath sounds. He has wheezes. He has no rales.   Abdominal: Soft. Bowel sounds are normal.   Musculoskeletal: Normal range of motion.   Neurological: " He is alert and oriented to person, place, and time.   Skin: Skin is warm and dry.   Psychiatric: He has a normal mood and affect.   Nursing note and vitals reviewed.      Assessment/Plan   Problems Addressed this Visit        Respiratory    Acute exacerbation of chronic obstructive pulmonary disease (COPD) (CMS/[facility]) - Primary    Relevant Medications    methylPREDNISolone acetate (DEPO-medrol) injection 80 mg    MethylPREDNISolone (MEDROL, ELISE,) 4 MG tablet    levoFLOXacin (LEVAQUIN) 500 MG tablet    promethazine-dextromethorphan (PROMETHAZINE-DM) 6.25-15 MG/5ML syrup       Other    Tobacco dependence syndrome        depomedrol IM today  Start depo dose pack  Start levaquin  Start phenergan DM    Call if symptoms fail to improve, recommended him to consider ER if his breathing worsens.    I advised Kermit of the risks of continuing to use tobacco, and I provided him with tobacco cessation educational materials in the After Visit Summary.     During this visit, I spent <3 minutes counseling the patient regarding tobacco cessation.    Recheck as needed

## 2018-08-31 DIAGNOSIS — J44.9 COPD, SEVERE (HCC): ICD-10-CM

## 2018-08-31 RX ORDER — ALBUTEROL SULFATE 90 UG/1
2 AEROSOL, METERED RESPIRATORY (INHALATION) EVERY 6 HOURS PRN
Qty: 1 INHALER | Refills: 11 | Status: SHIPPED | OUTPATIENT
Start: 2018-08-31 | End: 2019-04-05 | Stop reason: SDUPTHER

## 2018-08-31 RX ORDER — ALBUTEROL SULFATE 2.5 MG/3ML
2.5 SOLUTION RESPIRATORY (INHALATION) EVERY 4 HOURS PRN
Qty: 180 VIAL | Refills: 1 | Status: SHIPPED | OUTPATIENT
Start: 2018-08-31 | End: 2018-11-07 | Stop reason: SDUPTHER

## 2018-11-07 DIAGNOSIS — J44.9 COPD, SEVERE (HCC): ICD-10-CM

## 2018-11-07 RX ORDER — ALBUTEROL SULFATE 2.5 MG/3ML
SOLUTION RESPIRATORY (INHALATION)
Qty: 1 VIAL | Refills: 12 | Status: SHIPPED | OUTPATIENT
Start: 2018-11-07 | End: 2018-12-06 | Stop reason: ALTCHOICE

## 2018-11-15 ENCOUNTER — OFFICE VISIT (OUTPATIENT)
Dept: FAMILY MEDICINE CLINIC | Facility: CLINIC | Age: 63
End: 2018-11-15

## 2018-11-15 VITALS
OXYGEN SATURATION: 99 % | DIASTOLIC BLOOD PRESSURE: 70 MMHG | HEIGHT: 69 IN | SYSTOLIC BLOOD PRESSURE: 120 MMHG | BODY MASS INDEX: 20.91 KG/M2 | HEART RATE: 79 BPM | TEMPERATURE: 97.5 F | WEIGHT: 141.2 LBS

## 2018-11-15 DIAGNOSIS — J44.1 COPD WITH ACUTE EXACERBATION (HCC): Primary | ICD-10-CM

## 2018-11-15 DIAGNOSIS — J44.9 COPD, SEVERE (HCC): ICD-10-CM

## 2018-11-15 DIAGNOSIS — F17.200 TOBACCO DEPENDENCE SYNDROME: ICD-10-CM

## 2018-11-15 DIAGNOSIS — Z99.81 REQUIRES SUPPLEMENTAL OXYGEN: ICD-10-CM

## 2018-11-15 PROCEDURE — 94640 AIRWAY INHALATION TREATMENT: CPT | Performed by: FAMILY MEDICINE

## 2018-11-15 PROCEDURE — 96372 THER/PROPH/DIAG INJ SC/IM: CPT | Performed by: FAMILY MEDICINE

## 2018-11-15 PROCEDURE — 99214 OFFICE O/P EST MOD 30 MIN: CPT | Performed by: FAMILY MEDICINE

## 2018-11-15 RX ORDER — TRIAMCINOLONE ACETONIDE 40 MG/ML
80 INJECTION, SUSPENSION INTRA-ARTICULAR; INTRAMUSCULAR ONCE
Status: COMPLETED | OUTPATIENT
Start: 2018-11-15 | End: 2018-11-15

## 2018-11-15 RX ORDER — METHYLPREDNISOLONE 4 MG/1
TABLET ORAL
Qty: 21 EACH | Refills: 0 | Status: SHIPPED | OUTPATIENT
Start: 2018-11-15 | End: 2018-11-21

## 2018-11-15 RX ORDER — DOXYCYCLINE HYCLATE 100 MG
100 TABLET ORAL 2 TIMES DAILY
Qty: 20 TABLET | Refills: 0 | Status: SHIPPED | OUTPATIENT
Start: 2018-11-15 | End: 2018-11-25

## 2018-11-15 RX ORDER — IPRATROPIUM BROMIDE AND ALBUTEROL SULFATE 2.5; .5 MG/3ML; MG/3ML
3 SOLUTION RESPIRATORY (INHALATION) ONCE
Status: COMPLETED | OUTPATIENT
Start: 2018-11-15 | End: 2018-11-15

## 2018-11-15 RX ADMIN — TRIAMCINOLONE ACETONIDE 80 MG: 40 INJECTION, SUSPENSION INTRA-ARTICULAR; INTRAMUSCULAR at 15:48

## 2018-11-15 RX ADMIN — IPRATROPIUM BROMIDE AND ALBUTEROL SULFATE 3 ML: 2.5; .5 SOLUTION RESPIRATORY (INHALATION) at 15:49

## 2018-11-15 NOTE — PROGRESS NOTES
"Subjective   Chief Complaint   Patient presents with   • Nasal Congestion     about 1 week   • Sore Throat   • Cough     productive     Kermit Corley is a 63 y.o. male.   Nasal Congestion (about 1 week); Sore Throat; and Cough (productive)    History of Present Illness     Presents today with complaints of feeling unwell  Started having symptoms about one week ago  Complaints of shortness of breath, productive cough, sore throat, nasal congestion, postnasal drainage  Sick contacts with a neighbor  Denies fever or chills  Has not tried anything at home or over the counter  History of COPD - supplemental home oxygen used    Due for re-evaluation for home oxygen today  He has brought paperwork with him today to be filled out - this includes:  Oxygen saturation at rest  Oxygen saturation on exertion  Oxygen saturation with exertion on oxygen  These are all documented below    The following portions of the patient's history were reviewed and updated as appropriate: allergies, current medications, past family history, past medical history, past social history, past surgical history and problem list.    Review of Systems   Constitutional: Positive for fatigue. Negative for appetite change, chills and fever.   HENT: Positive for congestion, postnasal drip and sore throat. Negative for ear pain and rhinorrhea.    Eyes: Negative for pain.   Respiratory: Positive for cough, shortness of breath and wheezing.    Cardiovascular: Negative for chest pain and palpitations.   Gastrointestinal: Negative for abdominal pain, constipation, diarrhea and nausea.   Genitourinary: Negative for dysuria.   Musculoskeletal: Negative for back pain, joint swelling and neck pain.   Skin: Negative for rash.   Neurological: Negative for dizziness and headaches.       Objective   /70   Pulse 79   Temp 97.5 °F (36.4 °C)   Ht 175.3 cm (69.02\")   Wt 64 kg (141 lb 3.2 oz)   SpO2 99% Comment: with oxygen 2 lpm  BMI 20.84 kg/m²  " "  Vitals:    11/15/18 1505 11/15/18 1541 11/15/18 1545 11/15/18 1552   BP: 120/70      Pulse: 79      Temp: 97.5 °F (36.4 °C)      SpO2: (!) 89% (!) 88%  Comment: Exertion with room air 94%  Comment: exertion with oxygen 99%  Comment: with oxygen 2 lpm   Weight: 64 kg (141 lb 3.2 oz)      Height: 175.3 cm (69.02\")      PainSc:   8          Physical Exam   Constitutional: He is oriented to person, place, and time. He appears well-developed and well-nourished.   HENT:   Head: Normocephalic and atraumatic.   Nose: Sinus tenderness present. Right sinus exhibits maxillary sinus tenderness. Left sinus exhibits maxillary sinus tenderness.   Mouth/Throat: Posterior oropharyngeal erythema present.   Bilateral cerumen in ear canal   Eyes: Pupils are equal, round, and reactive to light.   Neck: Normal range of motion. Neck supple.   Cardiovascular: Normal rate, regular rhythm and normal heart sounds.   Pulmonary/Chest: Effort normal. No respiratory distress. He has decreased breath sounds. He has wheezes. He has no rales.   Neurological: He is alert and oriented to person, place, and time.   Skin: Skin is warm and dry.   Psychiatric: He has a normal mood and affect.   Nursing note and vitals reviewed.      Assessment/Plan   Problems Addressed this Visit        Respiratory    COPD, severe (CMS/HCC)    Relevant Medications    MethylPREDNISolone (MEDROL, ELISE,) 4 MG tablet    Requires supplemental oxygen       Other    Tobacco dependence syndrome      Other Visit Diagnoses     COPD with acute exacerbation (CMS/HCC)    -  Primary    Relevant Medications    ipratropium-albuterol (DUO-NEB) nebulizer solution 3 mL (Completed)    triamcinolone acetonide (KENALOG-40) injection 80 mg (Completed)    MethylPREDNISolone (MEDROL, ELISE,) 4 MG tablet        duoneb in the office - moving more air now after treatment  Tolerated nebulizer treatment  Instructed to have patient use home nebulizer as directed  Use albuterol inhaler as needed    Start " medrol dose pack  Kenalog IM today  Start doxycycline BID    Oxygen testing done for supplemental oxygen    I advised Kermit of the risks of continuing to use tobacco, and I provided him with tobacco cessation educational materials in the After Visit Summary.     During this visit, I spent <3 minutes counseling the patient regarding tobacco cessation.    Recheck as needed

## 2018-12-06 ENCOUNTER — OFFICE VISIT (OUTPATIENT)
Dept: FAMILY MEDICINE CLINIC | Facility: CLINIC | Age: 63
End: 2018-12-06

## 2018-12-06 VITALS
RESPIRATION RATE: 22 BRPM | WEIGHT: 139 LBS | HEIGHT: 69 IN | HEART RATE: 96 BPM | TEMPERATURE: 99.9 F | BODY MASS INDEX: 20.59 KG/M2 | SYSTOLIC BLOOD PRESSURE: 110 MMHG | OXYGEN SATURATION: 93 % | DIASTOLIC BLOOD PRESSURE: 60 MMHG

## 2018-12-06 DIAGNOSIS — J44.1 ACUTE EXACERBATION OF CHRONIC OBSTRUCTIVE PULMONARY DISEASE (COPD) (HCC): Primary | ICD-10-CM

## 2018-12-06 DIAGNOSIS — Z99.81 REQUIRES SUPPLEMENTAL OXYGEN: ICD-10-CM

## 2018-12-06 DIAGNOSIS — J44.1 ACUTE EXACERBATION OF CHRONIC OBSTRUCTIVE PULMONARY DISEASE (COPD) (HCC): ICD-10-CM

## 2018-12-06 DIAGNOSIS — F17.200 TOBACCO DEPENDENCE SYNDROME: ICD-10-CM

## 2018-12-06 PROCEDURE — 99214 OFFICE O/P EST MOD 30 MIN: CPT | Performed by: FAMILY MEDICINE

## 2018-12-06 PROCEDURE — 96372 THER/PROPH/DIAG INJ SC/IM: CPT | Performed by: FAMILY MEDICINE

## 2018-12-06 PROCEDURE — 94640 AIRWAY INHALATION TREATMENT: CPT | Performed by: FAMILY MEDICINE

## 2018-12-06 RX ORDER — DEXTROMETHORPHAN HYDROBROMIDE AND PROMETHAZINE HYDROCHLORIDE 15; 6.25 MG/5ML; MG/5ML
5 SYRUP ORAL 4 TIMES DAILY PRN
Qty: 240 ML | Refills: 0 | Status: SHIPPED | OUTPATIENT
Start: 2018-12-06 | End: 2018-12-13 | Stop reason: SDUPTHER

## 2018-12-06 RX ORDER — IPRATROPIUM BROMIDE AND ALBUTEROL SULFATE 2.5; .5 MG/3ML; MG/3ML
3 SOLUTION RESPIRATORY (INHALATION)
Qty: 360 ML | Refills: 0 | Status: SHIPPED | OUTPATIENT
Start: 2018-12-06 | End: 2019-02-25 | Stop reason: SDUPTHER

## 2018-12-06 RX ORDER — DEXTROMETHORPHAN HYDROBROMIDE AND PROMETHAZINE HYDROCHLORIDE 15; 6.25 MG/5ML; MG/5ML
5 SYRUP ORAL 4 TIMES DAILY PRN
Qty: 240 ML | Refills: 0 | Status: SHIPPED | OUTPATIENT
Start: 2018-12-06 | End: 2018-12-06 | Stop reason: SDUPTHER

## 2018-12-06 RX ORDER — LEVOFLOXACIN 500 MG/1
500 TABLET, FILM COATED ORAL DAILY
COMMUNITY
End: 2019-01-10

## 2018-12-06 RX ORDER — TRIAMCINOLONE ACETONIDE 40 MG/ML
80 INJECTION, SUSPENSION INTRA-ARTICULAR; INTRAMUSCULAR ONCE
Status: COMPLETED | OUTPATIENT
Start: 2018-12-06 | End: 2018-12-06

## 2018-12-06 RX ORDER — METHYLPREDNISOLONE 4 MG/1
TABLET ORAL
Qty: 21 EACH | Refills: 0 | Status: SHIPPED | OUTPATIENT
Start: 2018-12-06 | End: 2018-12-06 | Stop reason: SDUPTHER

## 2018-12-06 RX ORDER — METHYLPREDNISOLONE 4 MG/1
TABLET ORAL
Qty: 21 EACH | Refills: 0 | Status: SHIPPED | OUTPATIENT
Start: 2018-12-06 | End: 2018-12-12

## 2018-12-06 RX ORDER — IPRATROPIUM BROMIDE AND ALBUTEROL SULFATE 2.5; .5 MG/3ML; MG/3ML
3 SOLUTION RESPIRATORY (INHALATION)
Qty: 360 ML | Refills: 0 | Status: SHIPPED | OUTPATIENT
Start: 2018-12-06 | End: 2018-12-06 | Stop reason: SDUPTHER

## 2018-12-06 RX ORDER — IPRATROPIUM BROMIDE AND ALBUTEROL SULFATE 2.5; .5 MG/3ML; MG/3ML
3 SOLUTION RESPIRATORY (INHALATION) ONCE
Status: COMPLETED | OUTPATIENT
Start: 2018-12-06 | End: 2018-12-06

## 2018-12-06 RX ORDER — IPRATROPIUM BROMIDE AND ALBUTEROL SULFATE 2.5; .5 MG/3ML; MG/3ML
3 SOLUTION RESPIRATORY (INHALATION)
Status: DISCONTINUED | OUTPATIENT
Start: 2018-12-06 | End: 2018-12-06

## 2018-12-06 RX ADMIN — IPRATROPIUM BROMIDE AND ALBUTEROL SULFATE 3 ML: 2.5; .5 SOLUTION RESPIRATORY (INHALATION) at 09:20

## 2018-12-06 RX ADMIN — TRIAMCINOLONE ACETONIDE 80 MG: 40 INJECTION, SUSPENSION INTRA-ARTICULAR; INTRAMUSCULAR at 09:14

## 2018-12-06 NOTE — PROGRESS NOTES
"Subjective   Chief Complaint   Patient presents with   • Breathing Problem     coughing SOB      Kermit Corley is a 63 y.o. male.   Breathing Problem (coughing SOB )    History of Present Illness     Presents today with complaints of shortness of breath  Dr Waite started patient on levaquin Tuesday 12/4/18  He has persistent shortness of breath, coughing, chest pain  Last office visit with me 11/15 - he was treated for a copd exacerbation with steroid injection, antibiotic - doxycycline  Symptoms did improve and he stabilized  His symptoms restarted last week and has progressively worsened  He has tried mucinex, nebulizer treatments - last treatment was this morning at 7am without improvement in his breathing  He is labored today in the office - no oxygen with the patient    The following portions of the patient's history were reviewed and updated as appropriate: allergies, current medications, past family history, past medical history, past social history, past surgical history and problem list.    Review of Systems   Constitutional: Negative for appetite change, chills, fatigue and fever.   HENT: Positive for congestion, sinus pressure and sinus pain. Negative for ear pain, rhinorrhea and sore throat.    Eyes: Negative for pain.   Respiratory: Positive for cough, chest tightness, shortness of breath and wheezing.    Cardiovascular: Negative for chest pain and palpitations.   Gastrointestinal: Negative for abdominal pain, constipation, diarrhea and nausea.   Genitourinary: Negative for dysuria.   Musculoskeletal: Negative for back pain, joint swelling and neck pain.   Skin: Negative for rash.   Neurological: Negative for dizziness and headaches.       Objective   /60   Pulse 96   Temp 99.9 °F (37.7 °C) (Temporal)   Resp 22   Ht 175.3 cm (69\")   Wt 63 kg (139 lb)   SpO2 93% Comment: room air after 5 min 97  BMI 20.53 kg/m²   Physical Exam   Constitutional: He is oriented to person, place, and " time. He appears well-developed and well-nourished.   HENT:   Head: Normocephalic and atraumatic.   Eyes: Pupils are equal, round, and reactive to light.   Neck: Normal range of motion. Neck supple.   Cardiovascular: Normal rate, regular rhythm and normal heart sounds.   Pulmonary/Chest: He is in respiratory distress. He has decreased breath sounds. He has wheezes. He has rhonchi. He has no rales.   Musculoskeletal: Normal range of motion.   Neurological: He is alert and oriented to person, place, and time.   Skin: Skin is warm and dry.   Psychiatric: He has a normal mood and affect.   Nursing note and vitals reviewed.      Assessment/Plan   Problems Addressed this Visit        Respiratory    Requires supplemental oxygen       Other    Tobacco dependence syndrome      Other Visit Diagnoses     Acute exacerbation of chronic obstructive pulmonary disease (COPD) (CMS/[facility])    -  Primary    Relevant Medications    triamcinolone acetonide (KENALOG-40) injection 80 mg    MethylPREDNISolone (MEDROL, ELISE,) 4 MG tablet    promethazine-dextromethorphan (PROMETHAZINE-DM) 6.25-15 MG/5ML syrup    ipratropium-albuterol (DUO-NEB) nebulizer solution 3 mL (Start on 12/6/2018 12:30 PM)    ipratropium-albuterol (DUO-NEB) 0.5-2.5 mg/3 ml nebulizer        duoneb in office  Kenalog IM today    Continue with levaquin consider adding antibiotic  Start medrol dose pack  Start duoneb treatments at home  Start cough suppressant    Restart oxygen supplementation    If symptoms fail to improve and breathing is worsened - recommend to seek medical treatment in the ER    I advised Kermit of the risks of continuing to use tobacco, and I provided him with tobacco cessation educational materials in the After Visit Summary.     During this visit, I spent <3 minutes counseling the patient regarding tobacco cessation.    Recheck as needed

## 2018-12-13 ENCOUNTER — OFFICE VISIT (OUTPATIENT)
Dept: FAMILY MEDICINE CLINIC | Facility: CLINIC | Age: 63
End: 2018-12-13

## 2018-12-13 VITALS
BODY MASS INDEX: 20.59 KG/M2 | SYSTOLIC BLOOD PRESSURE: 118 MMHG | DIASTOLIC BLOOD PRESSURE: 60 MMHG | HEART RATE: 80 BPM | TEMPERATURE: 97.5 F | WEIGHT: 139 LBS | OXYGEN SATURATION: 98 % | HEIGHT: 69 IN

## 2018-12-13 DIAGNOSIS — Z99.81 REQUIRES SUPPLEMENTAL OXYGEN: ICD-10-CM

## 2018-12-13 DIAGNOSIS — F17.200 TOBACCO DEPENDENCE SYNDROME: ICD-10-CM

## 2018-12-13 DIAGNOSIS — J44.1 ACUTE EXACERBATION OF CHRONIC OBSTRUCTIVE PULMONARY DISEASE (COPD) (HCC): ICD-10-CM

## 2018-12-13 DIAGNOSIS — J44.1 COPD WITH ACUTE EXACERBATION (HCC): Primary | ICD-10-CM

## 2018-12-13 PROCEDURE — 99214 OFFICE O/P EST MOD 30 MIN: CPT | Performed by: FAMILY MEDICINE

## 2018-12-13 RX ORDER — BROMPHENIRAMINE MALEATE, PSEUDOEPHEDRINE HYDROCHLORIDE, AND DEXTROMETHORPHAN HYDROBROMIDE 2; 30; 10 MG/5ML; MG/5ML; MG/5ML
5 SYRUP ORAL 4 TIMES DAILY PRN
Qty: 473 ML | Refills: 0 | Status: SHIPPED | OUTPATIENT
Start: 2018-12-13 | End: 2019-01-10 | Stop reason: SDUPTHER

## 2018-12-13 RX ORDER — DOXYCYCLINE HYCLATE 100 MG
100 TABLET ORAL 2 TIMES DAILY
Qty: 20 TABLET | Refills: 0 | Status: SHIPPED | OUTPATIENT
Start: 2018-12-13 | End: 2018-12-23

## 2018-12-13 RX ORDER — DEXTROMETHORPHAN HYDROBROMIDE AND PROMETHAZINE HYDROCHLORIDE 15; 6.25 MG/5ML; MG/5ML
5 SYRUP ORAL 4 TIMES DAILY PRN
Qty: 473 ML | Refills: 0 | Status: SHIPPED | OUTPATIENT
Start: 2018-12-13 | End: 2018-12-13

## 2018-12-13 NOTE — PROGRESS NOTES
"Subjective   Chief Complaint   Patient presents with   • Sick visit   • Cough     productive   • Nasal Congestion     Kermit Corley is a 63 y.o. male.   Sick visit; Cough (productive); and Nasal Congestion    History of Present Illness     Presents today to recheck on copd exacerbation  Still hs complaints of shortness of breath, congestion, productive cough, wheezing  He was treated by Dr Waite with levaquin  He saw me in office 12/06/18 and was treated with duoneb, kenalog IM, medrol dose pack, and cough suppressant  He has only had very little improvement in his breathing    The following portions of the patient's history were reviewed and updated as appropriate: allergies, current medications, past family history, past medical history, past social history, past surgical history and problem list.    Review of Systems   Constitutional: Negative for appetite change, chills, fatigue and fever.   HENT: Positive for congestion. Negative for ear pain, rhinorrhea and sore throat.    Eyes: Negative for pain.   Respiratory: Positive for cough, chest tightness, shortness of breath and wheezing.    Cardiovascular: Negative for chest pain and palpitations.   Gastrointestinal: Negative for abdominal pain, constipation, diarrhea and nausea.   Genitourinary: Negative for dysuria.   Musculoskeletal: Negative for back pain, joint swelling and neck pain.   Skin: Negative for rash.   Neurological: Negative for dizziness and headaches.       Objective   /60   Pulse 80   Temp 97.5 °F (36.4 °C)   Ht 175.3 cm (69.02\")   Wt 63 kg (139 lb)   SpO2 98% Comment: with O2  BMI 20.52 kg/m²   Physical Exam   Constitutional: He is oriented to person, place, and time. He appears well-developed and well-nourished.   HENT:   Head: Normocephalic and atraumatic.   Eyes: Pupils are equal, round, and reactive to light.   Neck: Normal range of motion. Neck supple.   Cardiovascular: Normal rate, regular rhythm and normal heart " sounds.   Pulmonary/Chest: No respiratory distress. He has wheezes. He has rales.   Neurological: He is alert and oriented to person, place, and time.   Skin: Skin is warm and dry.   Psychiatric: He has a normal mood and affect.   Nursing note and vitals reviewed.      Assessment/Plan   Problems Addressed this Visit        Respiratory    Requires supplemental oxygen       Other    Tobacco dependence syndrome      Other Visit Diagnoses     COPD with acute exacerbation (CMS/[facility])    -  Primary    Relevant Medications    promethazine-dextromethorphan (PROMETHAZINE-DM) 6.25-15 MG/5ML syrup    Other Relevant Orders    XR Chest 2 View (Completed)    Acute exacerbation of chronic obstructive pulmonary disease (COPD) (CMS/[facility])        Relevant Medications    promethazine-dextromethorphan (PROMETHAZINE-DM) 6.25-15 MG/5ML syrup        Patient sent down for stat xray     IMPRESSION:  CONCLUSION: Emphysematous changes, COPD. Subtle superimposed  increased interstitial markings left midlung field possibly early  changes of superimposed pneumonitis.    duoneb followed in the office - symptoms improved  He was provided with a few samples of duoneb solution    Start doxycycline  Start cough suppressant  Continue with inhalers vs nebulizer treatments    If breathing worsens he was instructed to proceed to the ER or call EMS    Will call the patient tomorrow and Monday to check on him and see how he's doing    Recheck as needed

## 2018-12-31 DIAGNOSIS — F51.01 PRIMARY INSOMNIA: ICD-10-CM

## 2019-01-04 RX ORDER — AMITRIPTYLINE HYDROCHLORIDE 50 MG/1
TABLET, FILM COATED ORAL
Qty: 60 TABLET | Refills: 5 | Status: SHIPPED | OUTPATIENT
Start: 2019-01-04 | End: 2019-01-10 | Stop reason: SDUPTHER

## 2019-01-10 ENCOUNTER — OFFICE VISIT (OUTPATIENT)
Dept: FAMILY MEDICINE CLINIC | Facility: CLINIC | Age: 64
End: 2019-01-10

## 2019-01-10 VITALS
HEART RATE: 76 BPM | TEMPERATURE: 98.8 F | DIASTOLIC BLOOD PRESSURE: 74 MMHG | WEIGHT: 139 LBS | RESPIRATION RATE: 22 BRPM | HEIGHT: 69 IN | OXYGEN SATURATION: 98 % | BODY MASS INDEX: 20.59 KG/M2 | SYSTOLIC BLOOD PRESSURE: 122 MMHG

## 2019-01-10 DIAGNOSIS — F17.200 TOBACCO DEPENDENCE SYNDROME: ICD-10-CM

## 2019-01-10 DIAGNOSIS — F51.01 PRIMARY INSOMNIA: ICD-10-CM

## 2019-01-10 DIAGNOSIS — J44.1 ACUTE EXACERBATION OF CHRONIC OBSTRUCTIVE PULMONARY DISEASE (COPD) (HCC): Primary | ICD-10-CM

## 2019-01-10 PROCEDURE — 96372 THER/PROPH/DIAG INJ SC/IM: CPT | Performed by: FAMILY MEDICINE

## 2019-01-10 PROCEDURE — 99214 OFFICE O/P EST MOD 30 MIN: CPT | Performed by: FAMILY MEDICINE

## 2019-01-10 RX ORDER — BROMPHENIRAMINE MALEATE, PSEUDOEPHEDRINE HYDROCHLORIDE, AND DEXTROMETHORPHAN HYDROBROMIDE 2; 30; 10 MG/5ML; MG/5ML; MG/5ML
5 SYRUP ORAL 4 TIMES DAILY PRN
Qty: 473 ML | Refills: 0 | Status: SHIPPED | OUTPATIENT
Start: 2019-01-10 | End: 2019-01-11 | Stop reason: SDUPTHER

## 2019-01-10 RX ORDER — AMOXICILLIN AND CLAVULANATE POTASSIUM 875; 125 MG/1; MG/1
1 TABLET, FILM COATED ORAL 2 TIMES DAILY
Qty: 20 TABLET | Refills: 0 | Status: SHIPPED | OUTPATIENT
Start: 2019-01-10 | End: 2019-01-20

## 2019-01-10 RX ORDER — AMITRIPTYLINE HYDROCHLORIDE 100 MG/1
100 TABLET, FILM COATED ORAL NIGHTLY
Qty: 90 TABLET | Refills: 0 | Status: SHIPPED | OUTPATIENT
Start: 2019-01-10 | End: 2019-07-15 | Stop reason: SDUPTHER

## 2019-01-10 RX ORDER — METHYLPREDNISOLONE ACETATE 80 MG/ML
80 INJECTION, SUSPENSION INTRA-ARTICULAR; INTRALESIONAL; INTRAMUSCULAR; SOFT TISSUE ONCE
Status: COMPLETED | OUTPATIENT
Start: 2019-01-10 | End: 2019-01-10

## 2019-01-10 RX ADMIN — METHYLPREDNISOLONE ACETATE 80 MG: 80 INJECTION, SUSPENSION INTRA-ARTICULAR; INTRALESIONAL; INTRAMUSCULAR; SOFT TISSUE at 09:56

## 2019-01-10 NOTE — PROGRESS NOTES
"Subjective   Chief Complaint   Patient presents with   • URI     coughing body aches runny stuffy nose been vomitng last couple days      Kermit Corley is a 63 y.o. male.   URI (coughing body aches runny stuffy nose been vomitng last couple days )    History of Present Illness     Has complaints of feeling unwell and associated with insomnia  Symptoms started last week  Complaints of productive cough, shortness of air, wheezing, rhinorrhea, congestion, nausea and vomiting  Has been using his regular inhalers without improvement    Asking for a refill on elavil    The following portions of the patient's history were reviewed and updated as appropriate: allergies, current medications, past family history, past medical history, past social history, past surgical history and problem list.    Review of Systems   Constitutional: Negative for appetite change, chills, fatigue and fever.   HENT: Positive for congestion, postnasal drip, rhinorrhea, sinus pressure and sore throat. Negative for ear pain.    Eyes: Negative for pain.   Respiratory: Positive for cough, chest tightness, shortness of breath and wheezing.    Cardiovascular: Negative for chest pain and palpitations.   Gastrointestinal: Negative for abdominal pain, constipation, diarrhea and nausea.   Genitourinary: Negative for dysuria.   Musculoskeletal: Negative for back pain, joint swelling and neck pain.   Skin: Negative for rash.   Neurological: Positive for headaches. Negative for dizziness.       Objective   /74   Pulse 76   Temp 98.8 °F (37.1 °C) (Temporal)   Resp 22   Ht 175.3 cm (69\")   Wt 63 kg (139 lb)   SpO2 98%   BMI 20.53 kg/m²   Physical Exam   Constitutional: He is oriented to person, place, and time. He appears well-developed and well-nourished.   HENT:   Head: Normocephalic and atraumatic.   Right Ear: External ear normal.   Left Ear: External ear normal.   Nose: Sinus tenderness present. Right sinus exhibits maxillary sinus " tenderness. Left sinus exhibits maxillary sinus tenderness.   Mouth/Throat: Posterior oropharyngeal erythema present.   Eyes: Pupils are equal, round, and reactive to light.   Neck: Normal range of motion. Neck supple.   Cardiovascular: Normal rate, regular rhythm and normal heart sounds.   Pulmonary/Chest: No respiratory distress. He has decreased breath sounds. He has wheezes. He has rhonchi. He has no rales.   Abdominal: Soft. Bowel sounds are normal.   Musculoskeletal: Normal range of motion.   Neurological: He is alert and oriented to person, place, and time.   Skin: Skin is warm and dry.   Psychiatric: He has a normal mood and affect.   Nursing note and vitals reviewed.      Assessment/Plan   Problems Addressed this Visit        Other    Tobacco dependence syndrome    Insomnia    Relevant Medications    amitriptyline (ELAVIL) 100 MG tablet      Other Visit Diagnoses     Acute exacerbation of chronic obstructive pulmonary disease (COPD) (CMS/[facility])    -  Primary    Relevant Medications    brompheniramine-pseudoephedrine-DM 30-2-10 MG/5ML syrup    methylPREDNISolone acetate (DEPO-medrol) injection 80 mg    amoxicillin-clavulanate (AUGMENTIN) 875-125 MG per tablet        Start augmentin  depomedrol IM   Start bromfed for cough  Provided samples of nebulizer solution    Refilled elavil    Continue with nicotine patches for smoking cessation    Return if symptoms worsen  If symptoms worsen consider evaluation in the ER

## 2019-01-11 DIAGNOSIS — J44.1 ACUTE EXACERBATION OF CHRONIC OBSTRUCTIVE PULMONARY DISEASE (COPD) (HCC): ICD-10-CM

## 2019-01-11 RX ORDER — BROMPHENIRAMINE MALEATE, PSEUDOEPHEDRINE HYDROCHLORIDE, AND DEXTROMETHORPHAN HYDROBROMIDE 2; 30; 10 MG/5ML; MG/5ML; MG/5ML
5 SYRUP ORAL 4 TIMES DAILY PRN
Qty: 473 ML | Refills: 0 | Status: SHIPPED | OUTPATIENT
Start: 2019-01-11 | End: 2019-03-04

## 2019-02-25 RX ORDER — IPRATROPIUM BROMIDE AND ALBUTEROL SULFATE 2.5; .5 MG/3ML; MG/3ML
3 SOLUTION RESPIRATORY (INHALATION)
Qty: 360 ML | Refills: 0 | Status: SHIPPED | OUTPATIENT
Start: 2019-02-25 | End: 2019-10-26 | Stop reason: SDUPTHER

## 2019-03-04 ENCOUNTER — OFFICE VISIT (OUTPATIENT)
Dept: FAMILY MEDICINE CLINIC | Facility: CLINIC | Age: 64
End: 2019-03-04

## 2019-03-04 VITALS
TEMPERATURE: 98 F | HEIGHT: 69 IN | SYSTOLIC BLOOD PRESSURE: 124 MMHG | WEIGHT: 135.6 LBS | DIASTOLIC BLOOD PRESSURE: 78 MMHG | BODY MASS INDEX: 20.08 KG/M2 | HEART RATE: 111 BPM

## 2019-03-04 DIAGNOSIS — J44.9 COPD, SEVERE (HCC): ICD-10-CM

## 2019-03-04 DIAGNOSIS — F17.200 TOBACCO DEPENDENCE SYNDROME: ICD-10-CM

## 2019-03-04 DIAGNOSIS — Z09 HOSPITAL DISCHARGE FOLLOW-UP: Primary | ICD-10-CM

## 2019-03-04 DIAGNOSIS — Z99.81 REQUIRES SUPPLEMENTAL OXYGEN: ICD-10-CM

## 2019-03-04 DIAGNOSIS — M25.522 LEFT ELBOW PAIN: ICD-10-CM

## 2019-03-04 PROCEDURE — 99214 OFFICE O/P EST MOD 30 MIN: CPT | Performed by: FAMILY MEDICINE

## 2019-03-04 RX ORDER — CEFDINIR 300 MG/1
300 CAPSULE ORAL 2 TIMES DAILY
Qty: 10 CAPSULE | Refills: 0 | Status: SHIPPED | OUTPATIENT
Start: 2019-03-04 | End: 2019-04-05

## 2019-03-04 RX ORDER — MONTELUKAST SODIUM 10 MG/1
TABLET ORAL
COMMUNITY
Start: 2019-03-01 | End: 2019-07-15 | Stop reason: SDUPTHER

## 2019-03-04 RX ORDER — METHYLPREDNISOLONE 4 MG/1
TABLET ORAL
Qty: 21 EACH | Refills: 0 | Status: SHIPPED | OUTPATIENT
Start: 2019-03-04 | End: 2019-03-10

## 2019-03-04 NOTE — PROGRESS NOTES
Subjective   Chief Complaint   Patient presents with   • COPD     hospital f/u     Kermit Corley is a 63 y.o. male.   COPD (hospital f/u)    History of Present Illness     Presents today for a hospital follow up from [facility].  He was admitted 2/13 and discharged home 2/24.  He was seen in the ER for progressive shortness of breath.  He was diagnosed with a copd exacerbation.  He received solumedrol 125mg IV and albuterol nebs x 2 in the ER.  He was admitted to the medical floor and continued on solumedrol IV, nebs and started on levaquin.  Cultures were obtained.    Due to a repeat cxr indicating a possible infiltrate his antibiotics were broadened with cefepime, zosyn, and vancomycin.  Sputum culture was positive for candida.  He was treated with diflucan.  Follow up xray was negative - he was discharged home with cefdinir and oral prednisone.  He has completed both courses of medications.  Today has complaints of shortness of breath, productive cough.  Denies fever or chills.  He rates his symptoms at 7/10 today, still is not back to his baseline.  He is using supplemental oxygen at home but has been trying to wean himself from continuous use.  He is currently using his nebulizer and hand held inhalers as scheduled.  He is currently smoking 5 cigarettes per day and using nicoderm patches.  He has a smoking history of 45 years.    C/o left elbow pain    The following portions of the patient's history were reviewed and updated as appropriate: allergies, current medications, past family history, past medical history, past social history, past surgical history and problem list.    Review of Systems   Constitutional: Negative for appetite change, chills, fatigue and fever.   HENT: Negative for congestion, ear pain, rhinorrhea and sore throat.    Eyes: Negative for pain.   Respiratory: Positive for cough, chest tightness, shortness of breath and wheezing.    Cardiovascular: Negative for chest pain  "and palpitations.   Gastrointestinal: Negative for abdominal pain, constipation, diarrhea and nausea.   Genitourinary: Negative for dysuria.   Musculoskeletal: Positive for arthralgias. Negative for back pain, joint swelling and neck pain.   Skin: Negative for rash.   Neurological: Negative for dizziness and headaches.       Objective   /78   Pulse 111   Temp 98 °F (36.7 °C)   Ht 175.3 cm (69\")   Wt 61.5 kg (135 lb 9.6 oz)   BMI 20.02 kg/m²   Physical Exam   Constitutional: He is oriented to person, place, and time. He appears well-developed and well-nourished.   HENT:   Head: Normocephalic and atraumatic.   Eyes: Pupils are equal, round, and reactive to light.   Neck: Normal range of motion. Neck supple.   Cardiovascular: Regular rhythm and normal heart sounds. Tachycardia present.   Pulmonary/Chest: No respiratory distress. He has decreased breath sounds. He has wheezes. He has no rales.   Supplemental oxygen by NC in place   Abdominal: Soft. Bowel sounds are normal.   Musculoskeletal:        Left elbow: He exhibits decreased range of motion. Tenderness found.   Erythema of the left elbow  No heat or effusion noted   Neurological: He is alert and oriented to person, place, and time.   Skin: Skin is warm and dry.   Psychiatric: He has a normal mood and affect.   Nursing note and vitals reviewed.      Assessment/Plan   Problems Addressed this Visit        Respiratory    COPD, severe (CMS/HCC)    Relevant Medications    montelukast (SINGULAIR) 10 MG tablet    methylPREDNISolone (MEDROL, ELISE,) 4 MG tablet    Requires supplemental oxygen       Other    Tobacco dependence syndrome      Other Visit Diagnoses     Hospital discharge follow-up    -  Primary    Left elbow pain            Elbow wrapped in the office  Start voltaren gel as needed for pain    Reviewed hospital medical records with the patient  Continue with cefdinir  Ordered medrol dose pack    I advised Kermit of the risks of continuing to use " tobacco, and I provided him with tobacco cessation educational materials in the After Visit Summary.     During this visit, I spent <3 minutes counseling the patient regarding tobacco cessation.    Patient's Body mass index is 20.02 kg/m². BMI is within normal parameters. No follow-up required.     Recheck as needed

## 2019-04-05 ENCOUNTER — OFFICE VISIT (OUTPATIENT)
Dept: FAMILY MEDICINE CLINIC | Facility: CLINIC | Age: 64
End: 2019-04-05

## 2019-04-05 VITALS
WEIGHT: 135 LBS | BODY MASS INDEX: 19.99 KG/M2 | HEIGHT: 69 IN | SYSTOLIC BLOOD PRESSURE: 128 MMHG | HEART RATE: 113 BPM | DIASTOLIC BLOOD PRESSURE: 62 MMHG | OXYGEN SATURATION: 93 % | TEMPERATURE: 98.5 F

## 2019-04-05 DIAGNOSIS — J44.1 ACUTE EXACERBATION OF CHRONIC OBSTRUCTIVE PULMONARY DISEASE (COPD) (HCC): Primary | ICD-10-CM

## 2019-04-05 DIAGNOSIS — J18.9 PNEUMONIA OF RIGHT LOWER LOBE DUE TO INFECTIOUS ORGANISM: ICD-10-CM

## 2019-04-05 PROCEDURE — 96372 THER/PROPH/DIAG INJ SC/IM: CPT | Performed by: PHYSICIAN ASSISTANT

## 2019-04-05 PROCEDURE — 99214 OFFICE O/P EST MOD 30 MIN: CPT | Performed by: PHYSICIAN ASSISTANT

## 2019-04-05 RX ORDER — LEVOFLOXACIN 500 MG/1
500 TABLET, FILM COATED ORAL DAILY
Qty: 7 TABLET | Refills: 0 | Status: SHIPPED | OUTPATIENT
Start: 2019-04-05 | End: 2019-04-12

## 2019-04-05 RX ORDER — CEFTRIAXONE 1 G/1
1 INJECTION, POWDER, FOR SOLUTION INTRAMUSCULAR; INTRAVENOUS ONCE
Status: COMPLETED | OUTPATIENT
Start: 2019-04-05 | End: 2019-04-05

## 2019-04-05 RX ORDER — METHYLPREDNISOLONE ACETATE 80 MG/ML
80 INJECTION, SUSPENSION INTRA-ARTICULAR; INTRALESIONAL; INTRAMUSCULAR; SOFT TISSUE ONCE
Status: COMPLETED | OUTPATIENT
Start: 2019-04-05 | End: 2019-04-05

## 2019-04-05 RX ORDER — GUAIFENESIN 1200 MG/1
1200 TABLET, EXTENDED RELEASE ORAL 2 TIMES DAILY
Qty: 60 EACH | Refills: 0 | Status: SHIPPED | OUTPATIENT
Start: 2019-04-05 | End: 2019-07-09

## 2019-04-05 RX ORDER — METHYLPREDNISOLONE 4 MG/1
TABLET ORAL
Qty: 1 EACH | Refills: 0 | Status: SHIPPED | OUTPATIENT
Start: 2019-04-05 | End: 2019-04-12

## 2019-04-05 RX ORDER — AMITRIPTYLINE HYDROCHLORIDE 50 MG/1
TABLET, FILM COATED ORAL
COMMUNITY
Start: 2019-03-10 | End: 2019-05-01

## 2019-04-05 RX ORDER — ALBUTEROL SULFATE 90 UG/1
2 AEROSOL, METERED RESPIRATORY (INHALATION) EVERY 6 HOURS PRN
Qty: 1 INHALER | Refills: 11 | Status: SHIPPED | OUTPATIENT
Start: 2019-04-05 | End: 2020-04-16 | Stop reason: SDUPTHER

## 2019-04-05 RX ADMIN — METHYLPREDNISOLONE ACETATE 80 MG: 80 INJECTION, SUSPENSION INTRA-ARTICULAR; INTRALESIONAL; INTRAMUSCULAR; SOFT TISSUE at 08:25

## 2019-04-05 RX ADMIN — CEFTRIAXONE 1 G: 1 INJECTION, POWDER, FOR SOLUTION INTRAMUSCULAR; INTRAVENOUS at 08:22

## 2019-04-05 NOTE — PROGRESS NOTES
Subjective   Kermit Corley is a 63 y.o. male.   Pt is new to me.  URI    This is a new problem. The current episode started in the past 7 days. The problem has been gradually worsening. There has been no fever. Associated symptoms include congestion, coughing, headaches (frontal), a plugged ear sensation (bilateral), rhinorrhea, sneezing, a sore throat and wheezing (worse than baseline). Pertinent negatives include no abdominal pain, chest pain, diarrhea, dysuria, ear pain, joint pain, joint swelling, nausea, neck pain, rash, sinus pain, swollen glands or vomiting. He has tried inhaler use and antihistamine for the symptoms.      Pt presents today for a sick visit. Pt admits to feeling persistently unwell x 3 days. Pt admits to productive cough (purulent) q few minutes, increased shortness of breath from baseline, fatigue, wheezing (worse than baseline). Pt admits to trying increase fluid intake, inhaler usage, with no improvement. Pt admits to hx of pneumonia in the past for which he was hospitalized. Pt admits to recent hospital discharge from [facility] due to copd exacerbation. Pt states he has been using supplemental oxygen 2L continuous since discharge. Admits to smoking hx of 45 years, smoking 5 cigarettes per day. Rates sx 10/10 today.    Denies fever, body aches, chills. Pt admits to using his inhalers more than normal.     The following portions of the patient's history were reviewed and updated as appropriate: allergies, current medications, past family history, past medical history, past social history, past surgical history and problem list.    Review of Systems   Constitutional: Positive for fatigue. Negative for activity change, appetite change, chills, diaphoresis, fever, unexpected weight gain and unexpected weight loss.   HENT: Positive for congestion, postnasal drip, rhinorrhea, sneezing and sore throat. Negative for dental problem, drooling, ear discharge, ear pain, facial swelling, hearing  loss, mouth sores, nosebleeds, sinus pressure, tinnitus, trouble swallowing and voice change.    Eyes: Negative for blurred vision, double vision and visual disturbance.   Respiratory: Positive for cough, chest tightness, shortness of breath (worse than baseline) and wheezing (worse than baseline). Negative for apnea, choking and stridor.    Cardiovascular: Negative for chest pain, palpitations and leg swelling.   Gastrointestinal: Negative for abdominal distention, abdominal pain, constipation, diarrhea, nausea, vomiting, GERD and indigestion.   Endocrine: Negative.    Genitourinary: Negative for dysuria.   Musculoskeletal: Negative for joint pain and neck pain.   Skin: Negative.  Negative for rash.   Neurological: Negative for dizziness, weakness, headache and confusion.   Psychiatric/Behavioral: Negative.        Objective   Physical Exam   Constitutional: He is oriented to person, place, and time. He appears well-developed and well-nourished. He is active and cooperative. He has a sickly appearance. No distress.   Appears frail   HENT:   Head: Normocephalic and atraumatic.   Right Ear: Hearing, external ear and ear canal normal. No drainage, swelling or tenderness. Tympanic membrane is bulging. A middle ear effusion (serous) is present. No decreased hearing is noted. cerumen impaction is not present.  Left Ear: Hearing, external ear and ear canal normal. No drainage, swelling or tenderness. Tympanic membrane is bulging. A middle ear effusion (serous) is present. No decreased hearing is noted. An impacted cerumen is not present.  Nose: Rhinorrhea and congestion present. No mucosal edema. Right sinus exhibits no maxillary sinus tenderness and no frontal sinus tenderness. Left sinus exhibits no maxillary sinus tenderness and no frontal sinus tenderness.   Mouth/Throat: Uvula is midline and mucous membranes are normal. Mucous membranes are not pale, not dry and not cyanotic. Posterior oropharyngeal erythema present.  No oropharyngeal exudate, posterior oropharyngeal edema or tonsillar abscesses. Tonsils are 0 on the right. Tonsils are 0 on the left. No tonsillar exudate.   Eyes: Conjunctivae and EOM are normal. Pupils are equal, round, and reactive to light.   Neck: Normal range of motion. Neck supple.   Cardiovascular: Normal rate, regular rhythm, normal heart sounds and intact distal pulses. Exam reveals no gallop and no friction rub.   No murmur heard.  Pulmonary/Chest: No stridor. Tachypnea noted. No respiratory distress. He has decreased breath sounds (diffuse, bilateral). He has wheezes (bilateral, diffuse). He has rhonchi (bilateral lower lobes). He has no rales. He exhibits no tenderness.   Supplemental oxygen by nasal cannula in place.   Abdominal: Soft. Bowel sounds are normal. He exhibits no distension. There is no tenderness. There is no guarding.   Musculoskeletal: Normal range of motion.   Lymphadenopathy:     He has no cervical adenopathy.   Neurological: He is alert and oriented to person, place, and time.   Skin: Skin is warm and dry. Capillary refill takes less than 2 seconds. No rash noted. He is not diaphoretic. No erythema. No pallor.   Psychiatric: He has a normal mood and affect. His behavior is normal. Judgment and thought content normal.   Nursing note and vitals reviewed.      Vitals:    04/05/19 0750   BP: 128/62   Pulse: 113   Temp: 98.5 °F (36.9 °C)   SpO2: 93%       Assessment/Plan   Kermit was seen today for fatigue and shortness of breath.    Diagnoses and all orders for this visit:    Acute exacerbation of chronic obstructive pulmonary disease (COPD) (CMS/[facility])  -     albuterol sulfate  (90 Base) MCG/ACT inhaler; Inhale 2 puffs Every 6 (Six) Hours As Needed for Wheezing or Shortness of Air.  -     guaiFENesin ER (MUCINEX MAXIMUM STRENGTH) 1200 MG tablet sustained-release 12 hour; Take 1,200 mg by mouth 2 (Two) Times a Day.  -     XR Chest 2 View; Future  -     levoFLOXacin (LEVAQUIN) 500 MG  tablet; Take 1 tablet by mouth Daily.  -     methylPREDNISolone acetate (DEPO-medrol) injection 80 mg  -     cefTRIAXone (ROCEPHIN) injection 1 g  -     methylPREDNISolone (MEDROL, ELISE,) 4 MG tablet; Take as directed on package instructions.    Pneumonia of right lower lobe due to infectious organism (CMS/HCC)  -     methylPREDNISolone (MEDROL, ELISE,) 4 MG tablet; Take as directed on package instructions.    Acute COPD exacerbation/pneumonia of RLL - refilled prescription for albuterol inhaler, as above. Prescription for mucinex, levaquin, & medrol dose elise sent to pharmacy. Discussed black box warnings of fluoroquinolones including increased risk for tendinitis/tendon rupture, peripheral neuropathy, & potential side effects of medication in office with pt. Pt verbalized understanding and is agreeable to plan of care. Depo-medrol IM injection and rocephin IM injection, as above given to pt in office. Chest xray ordered for evaluation & assessment. Discussed findings in office with pt including suspected pneumonia RLL base.     Advised pt to immediately go to ER if he develops worsening shortness of breath, wheezing, fever, chills, dehydration, coughing up blood, and discussed other concerning s/s.    Advised pt to sleep with head of bed raised. Advised pt to quit smoking. I advised Kermit of the risks of continuing to use tobacco. During this visit, I spent <3 minutes counseling the patient regarding tobacco cessation.      PROCEDURE: Chest, PA and lateral     DATE OF EXAM: 4/5/2019     HISTORY: Cough with wheezing for one week     2 views of the chest were obtained. Study is compared to prior  exam of 12/13/2018.     There is hyperexpansion of both lungs with flattening of the  hemidiaphragms suggesting chronic obstructive pulmonary disease.  Emphysematous changes with scattered fibrosis is evident. There  are numerous small calcified granuloma in both lungs, stable.  There is small patchy area of infiltrate right  base laterally.  The heart size is within normal limits and the vasculature does  not appear congested. The costophrenic angles are clear.     IMPRESSION:  Impression: COPD/emphysema. Small patchy area of acute infiltrate  is suspected laterally in the right base.     Electronically signed by:  Danie Bello MD  4/5/2019 8:45 AM CDT  Workstation: [id number]    Patient educated to follow up in 3 days on Monday 4/8/19 or sooner than next scheduled appointment if symptoms worsen or do not improve. Patient stated understanding and has agreed with plan of care. After visit summary was printed and given to patient.         This document has been electronically signed by Anjali Reyes PA-C on April 9, 2019 12:48 PM,.

## 2019-04-08 ENCOUNTER — OFFICE VISIT (OUTPATIENT)
Dept: FAMILY MEDICINE CLINIC | Facility: CLINIC | Age: 64
End: 2019-04-08

## 2019-04-08 VITALS
WEIGHT: 135 LBS | HEART RATE: 95 BPM | BODY MASS INDEX: 19.99 KG/M2 | OXYGEN SATURATION: 97 % | RESPIRATION RATE: 16 BRPM | DIASTOLIC BLOOD PRESSURE: 74 MMHG | SYSTOLIC BLOOD PRESSURE: 122 MMHG | HEIGHT: 69 IN | TEMPERATURE: 98.1 F

## 2019-04-08 DIAGNOSIS — J18.9 PNEUMONIA OF RIGHT LOWER LOBE DUE TO INFECTIOUS ORGANISM: Primary | ICD-10-CM

## 2019-04-08 DIAGNOSIS — J44.1 ACUTE EXACERBATION OF CHRONIC OBSTRUCTIVE PULMONARY DISEASE (COPD) (HCC): ICD-10-CM

## 2019-04-08 DIAGNOSIS — T14.8XXA SKIN ABRASION: ICD-10-CM

## 2019-04-08 PROCEDURE — 99213 OFFICE O/P EST LOW 20 MIN: CPT | Performed by: PHYSICIAN ASSISTANT

## 2019-04-08 NOTE — PROGRESS NOTES
Subjective   Kermit Corley is a 64 y.o. male.   Pt presents today for a f/u.  Pneumonia   He complains of chest tightness (improving), cough (improving), difficulty breathing (improving), frequent throat clearing, shortness of breath (improving, worse than baseline), sputum production (purulent) and wheezing. There is no hemoptysis or hoarse voice. This is a new problem. The current episode started in the past 7 days. The problem occurs constantly. The problem has been gradually improving. The cough is productive of purulent sputum. Associated symptoms include dyspnea on exertion, ear congestion (bilateral), malaise/fatigue, nasal congestion, orthopnea, postnasal drip, rhinorrhea, sneezing and a sore throat (mild). Pertinent negatives include no appetite change, chest pain, ear pain, fever, headaches, heartburn, myalgias, PND, sweats, trouble swallowing or weight loss. His symptoms are aggravated by change in weather, any activity, minimal activity, exercise and URI. His symptoms are alleviated by beta-agonist, oral steroids, OTC inhaler, steroid inhaler and rest. He reports moderate improvement on treatment. Risk factors for lung disease include smoking/tobacco exposure. His past medical history is significant for COPD and pneumonia. There is no history of asthma, bronchiectasis, bronchitis or emphysema.      Pt presents today for a f/u on dyspnea. Pt was seen on 4/5/19 and diagnosed with acute copd exacerbation & RLL pneumonia. Pt was prescribed levaquin, medrol dose john, mucinex, & refilled albuterol inhaler. Pt admits dyspnea, productive cough, chest tightness are improving (still worse than baseline) and admits to productive cough of purulent sputum improving. Pt admits to wheezing, congestion, rhinorrhea.    Pt had chest xray performed on 4/5/19.     IMPRESSION:  Impression: COPD/emphysema. Small patchy area of acute infiltrate  is suspected laterally in the right base.     Electronically signed by:   Danie Bello MD  4/5/2019 8:45 AM CDT  Workstation: [id number]    Pt also admits to skin abrasion to left lateral elbow x 1 month. Pt denies discharge to affected area. Admits to mild redness and mild pain to touch to left lateral elbow. Denies warmth, swelling to affected area.     The following portions of the patient's history were reviewed and updated as appropriate: allergies, current medications, past family history, past medical history, past social history, past surgical history and problem list.    Review of Systems   Constitutional: Positive for fatigue and malaise/fatigue. Negative for activity change, appetite change, chills, diaphoresis, fever, unexpected weight gain and unexpected weight loss.   HENT: Positive for congestion, postnasal drip, rhinorrhea, sneezing and sore throat (mild). Negative for dental problem, drooling, ear discharge, ear pain, facial swelling, hearing loss, hoarse voice, mouth sores, nosebleeds, sinus pressure, tinnitus, trouble swallowing and voice change.    Eyes: Negative for blurred vision, double vision and visual disturbance.   Respiratory: Positive for cough (improving), sputum production (purulent), chest tightness, shortness of breath (improving, worse than baseline) and wheezing. Negative for apnea, hemoptysis, choking and stridor.    Cardiovascular: Positive for dyspnea on exertion. Negative for chest pain, palpitations, leg swelling and PND.   Gastrointestinal: Negative for abdominal distention, abdominal pain, constipation, diarrhea, nausea, vomiting, GERD and indigestion.   Endocrine: Negative.    Genitourinary: Negative for dysuria.   Musculoskeletal: Negative for myalgias and neck pain.        Left elbow mild pain   Skin: Positive for color change (mild erythema to left elbow). Negative for dry skin, pallor, rash, skin lesions and bruise.   Neurological: Negative for dizziness, weakness, headache and confusion.   Psychiatric/Behavioral: Negative.        Objective    Physical Exam   Constitutional: He is oriented to person, place, and time. He appears well-developed and well-nourished. He is active and cooperative. He has a sickly appearance. No distress.   Appears frail   HENT:   Head: Normocephalic and atraumatic.   Right Ear: Hearing, external ear and ear canal normal. No drainage, swelling or tenderness. Tympanic membrane is bulging. A middle ear effusion (serous) is present. No decreased hearing is noted. cerumen impaction is not present.  Left Ear: Hearing, external ear and ear canal normal. No drainage, swelling or tenderness. Tympanic membrane is bulging. A middle ear effusion (serous) is present. No decreased hearing is noted. An impacted cerumen is not present.  Nose: Rhinorrhea and congestion present. No mucosal edema. Right sinus exhibits no maxillary sinus tenderness and no frontal sinus tenderness. Left sinus exhibits no maxillary sinus tenderness and no frontal sinus tenderness.   Mouth/Throat: Uvula is midline and mucous membranes are normal. Mucous membranes are not pale, not dry and not cyanotic. Posterior oropharyngeal erythema present. No oropharyngeal exudate, posterior oropharyngeal edema or tonsillar abscesses. Tonsils are 0 on the right. Tonsils are 0 on the left. No tonsillar exudate.   Eyes: Conjunctivae and EOM are normal. Pupils are equal, round, and reactive to light.   Neck: Normal range of motion. Neck supple.   Cardiovascular: Normal rate, regular rhythm, normal heart sounds and intact distal pulses. Exam reveals no gallop and no friction rub.   No murmur heard.  Pulmonary/Chest: No stridor. Tachypnea noted. No respiratory distress. He has decreased breath sounds (diffuse, bilateral). He has wheezes (bilateral, diffuse). He has rhonchi (bilateral lower lobes). He has no rales. He exhibits no tenderness.   Supplemental oxygen by nasal cannula in place.   Abdominal: Soft. Bowel sounds are normal. He exhibits no distension. There is no tenderness.  There is no guarding.   Musculoskeletal:        Left elbow: He exhibits decreased range of motion. He exhibits no swelling, no effusion, no deformity and no laceration. Tenderness found.   Erythema of the left elbow  No heat or effusion noted   Lymphadenopathy:     He has no cervical adenopathy.   Neurological: He is alert and oriented to person, place, and time.   Skin: Skin is warm and dry. Capillary refill takes less than 2 seconds. No rash noted. He is not diaphoretic. No erythema. No pallor.   Psychiatric: He has a normal mood and affect. His behavior is normal. Judgment and thought content normal.   Nursing note and vitals reviewed.    Vitals:    04/08/19 0810   BP: 122/74   Pulse: 95   Resp: 16   Temp: 98.1 °F (36.7 °C)   SpO2: 97%       Assessment/Plan   Kermit was seen today for follow-up.    Diagnoses and all orders for this visit:    Pneumonia of right lower lobe due to infectious organism (CMS/[facility])    Skin abrasion  -     mupirocin (BACTROBAN) 2 % ointment; Apply  topically to the appropriate area as directed 3 (Three) Times a Day.    Acute exacerbation of chronic obstructive pulmonary disease (COPD) (CMS/[facility])    Acute copd exacerbation/Pneumonia of RLL - improving, continue Prescription for mucinex, levaquin, & medrol dose john. Discussed black box warnings of fluoroquinolones including increased risk for tendinitis/tendon rupture, peripheral neuropathy, & potential side effects of medication in office with pt. Pt verbalized understanding and is agreeable to plan of care. Continue duo neb or nebulizer q 4-6 hours prn for dyspnea. Continue advair 2 puffs daily & sample of spiriva given to pt in office as pt states his insurance no longer covers this medication.     Advised pt to immediately go to ER if he develops worsening shortness of breath, wheezing, fever, chills, dehydration, coughing up blood, and discussed other concerning s/s.     Advised pt to sleep with head of bed raised. Advised pt to quit  smoking. I advised Kermit of the risks of continuing to use tobacco. During this visit, I spent <3 minutes counseling the patient regarding tobacco cessation.     Elbow wrapped in office and topical mupirocin applied to elbow. Prescription for mupirocin as above sent to pharmacy for pt to apply to left elbow. Advised pt to continue using voltaren gel for elbow pain.      Patient educated to follow up in 4 days for recheck or sooner than next scheduled appointment if symptoms worsen or do not improve. Patient stated understanding and has agreed with plan of care. After visit summary was printed and given to patient.         This document has been electronically signed by Anjali Reyes PA-C on April 10, 2019 1:03 PM,.

## 2019-04-12 ENCOUNTER — OFFICE VISIT (OUTPATIENT)
Dept: FAMILY MEDICINE CLINIC | Facility: CLINIC | Age: 64
End: 2019-04-12

## 2019-04-12 VITALS
BODY MASS INDEX: 19.99 KG/M2 | TEMPERATURE: 97.6 F | HEIGHT: 69 IN | SYSTOLIC BLOOD PRESSURE: 124 MMHG | HEART RATE: 85 BPM | DIASTOLIC BLOOD PRESSURE: 72 MMHG | WEIGHT: 135 LBS | OXYGEN SATURATION: 95 %

## 2019-04-12 DIAGNOSIS — Z99.81 REQUIRES SUPPLEMENTAL OXYGEN: ICD-10-CM

## 2019-04-12 DIAGNOSIS — R91.1 PULMONARY NODULE: ICD-10-CM

## 2019-04-12 DIAGNOSIS — Z12.2 ENCOUNTER FOR SCREENING FOR LUNG CANCER: ICD-10-CM

## 2019-04-12 DIAGNOSIS — F17.200 TOBACCO DEPENDENCE SYNDROME: ICD-10-CM

## 2019-04-12 DIAGNOSIS — H61.23 BILATERAL HEARING LOSS DUE TO CERUMEN IMPACTION: ICD-10-CM

## 2019-04-12 DIAGNOSIS — T14.8XXA SKIN ABRASION: ICD-10-CM

## 2019-04-12 DIAGNOSIS — Z13.6 SCREENING FOR AAA (AORTIC ABDOMINAL ANEURYSM): ICD-10-CM

## 2019-04-12 DIAGNOSIS — J44.9 COPD, SEVERE (HCC): Primary | ICD-10-CM

## 2019-04-12 DIAGNOSIS — Z87.891 PERSONAL HISTORY OF NICOTINE DEPENDENCE: ICD-10-CM

## 2019-04-12 PROCEDURE — 69209 REMOVE IMPACTED EAR WAX UNI: CPT | Performed by: PHYSICIAN ASSISTANT

## 2019-04-12 PROCEDURE — 99214 OFFICE O/P EST MOD 30 MIN: CPT | Performed by: PHYSICIAN ASSISTANT

## 2019-04-12 NOTE — PROGRESS NOTES
Subjective   Kermit Corley is a 64 y.o. male.   Pt presents today for a f/u on acute copd exacerbation & RLL pneumonia.   History of Present Illness   Pt presents today for a f/u on acute copd exacerbation & RLL pneumonia.     Pt presents today for a f/u on dyspnea, which he states is significantly improved. Pt states he was able to walk into the building today from his vehicle without oxygen. Pt states he completed his prescription of levaquin, medrol dose john, & mucinex. Pt admits to mild dyspnea (slightly worse than baseline), productive cough (serous, no longer purulent), chest tightness signficantly improved. Pt admits to mild wheezing, congestion. Pt denies rhinorrhea, nasal congestion, bilateral ear pressure. Pt admits to mild sore throat.     Pt had chest xray performed on 4/5/19.      IMPRESSION:  Impression: COPD/emphysema. Small patchy area of acute infiltrate  is suspected laterally in the right base.     Electronically signed by:  Danie Bello MD  4/5/2019 8:45 AM CDT  Workstation: [id number]    Pt also admits skin abrasion to left lateral elbow is improving with mupirocin tid prn. Pt denies discharge to affected area. Admits to mild redness & improving mild pain to touch of left lateral elbow. Denies warmth, swelling to affected area.     The following portions of the patient's history were reviewed and updated as appropriate: allergies, current medications, past family history, past medical history, past social history, past surgical history and problem list.    Review of Systems   Constitutional: Positive for fatigue. Negative for activity change, appetite change, chills, diaphoresis, fever, unexpected weight gain and unexpected weight loss.   HENT: Positive for sore throat (mild). Negative for congestion, dental problem, drooling, ear discharge, ear pain, facial swelling, hearing loss, mouth sores, nosebleeds, postnasal drip, rhinorrhea, sinus pressure, sneezing, tinnitus, trouble  swallowing and voice change.    Eyes: Negative for blurred vision, double vision and visual disturbance.   Respiratory: Positive for cough (improving), shortness of breath (improving, slightly worse than baseline) and wheezing. Negative for apnea, choking, chest tightness and stridor.    Cardiovascular: Negative for chest pain, palpitations and leg swelling.   Gastrointestinal: Negative for abdominal distention, abdominal pain, constipation, diarrhea, nausea, vomiting, GERD and indigestion.   Endocrine: Negative.    Genitourinary: Negative for dysuria.   Musculoskeletal: Negative for myalgias and neck pain.        Left elbow mild pain   Skin: Positive for color change (mild erythema to left elbow). Negative for dry skin, pallor, rash, skin lesions and bruise.   Neurological: Negative for dizziness, weakness, headache and confusion.   Psychiatric/Behavioral: Negative.        Objective   Physical Exam   Constitutional: He is oriented to person, place, and time. Vital signs are normal. He appears well-developed and well-nourished. He is active and cooperative.  Non-toxic appearance. He does not have a sickly appearance. No distress.   Appears frail   HENT:   Head: Normocephalic and atraumatic.   Right Ear: Tympanic membrane, external ear and ear canal normal. No drainage, swelling or tenderness. Tympanic membrane is not bulging. No middle ear effusion. Decreased hearing is noted. cerumen impaction is present.  Left Ear: Tympanic membrane, external ear and ear canal normal. No drainage, swelling or tenderness. Tympanic membrane is not bulging.  No middle ear effusion. Decreased hearing is noted. An impacted cerumen is present.  Nose: No mucosal edema, rhinorrhea or congestion. Right sinus exhibits no maxillary sinus tenderness and no frontal sinus tenderness. Left sinus exhibits no maxillary sinus tenderness and no frontal sinus tenderness.   Mouth/Throat: Uvula is midline and mucous membranes are normal. Mucous  membranes are not pale, not dry and not cyanotic. Posterior oropharyngeal erythema (mild) present. No oropharyngeal exudate, posterior oropharyngeal edema or tonsillar abscesses. Tonsils are 0 on the right. Tonsils are 0 on the left. No tonsillar exudate.   Eyes: Conjunctivae and EOM are normal. Pupils are equal, round, and reactive to light.   Neck: Normal range of motion. Neck supple.   Cardiovascular: Normal rate, regular rhythm, normal heart sounds and intact distal pulses. Exam reveals no gallop and no friction rub.   No murmur heard.  Pulmonary/Chest: No stridor. No tachypnea. No respiratory distress. He has decreased breath sounds (diffuse, bilateral). He has wheezes (bilateral, diffuse). He has no rhonchi. He has no rales. He exhibits no tenderness.   Supplemental oxygen by nasal cannula in place.   Abdominal: Soft. Bowel sounds are normal. He exhibits no distension. There is no tenderness. There is no guarding.   Musculoskeletal:        Left elbow: He exhibits decreased range of motion. He exhibits no swelling, no effusion, no deformity and no laceration. Tenderness found.   Erythema of the left elbow  No heat or effusion noted  Ambulating with cane   Lymphadenopathy:     He has no cervical adenopathy.   Neurological: He is alert and oriented to person, place, and time.   Skin: Skin is warm and dry. Capillary refill takes less than 2 seconds. No rash noted. He is not diaphoretic. No erythema. No pallor.   Psychiatric: He has a normal mood and affect. His behavior is normal. Judgment and thought content normal.   Nursing note and vitals reviewed.      Vitals:    04/12/19 0800   BP: 124/72   Pulse: 85   Temp: 97.6 °F (36.4 °C)   SpO2: 95%       Assessment/Plan   Kermit was seen today for follow-up.    Diagnoses and all orders for this visit:    COPD, severe (CMS/HCC)    Encounter for screening for lung cancer  -     CT Chest Low Dose Wo; Future    Screening for AAA (aortic abdominal aneurysm)  -     [place] AAA  Screen Limited; Future    Pulmonary nodule    Requires supplemental oxygen    Tobacco dependence syndrome  -     CT Chest Low Dose Wo; Future    Personal history of nicotine dependence   -     CT Chest Low Dose Wo; Future    Bilateral hearing loss due to cerumen impaction  -     Ear Cerumen Removal Instrumentation    Skin abrasion      Acute copd exacerbation/Pneumonia of RLL - resolved, completed Prescription for mucinex, levaquin, & medrol dose john. Continue duo neb or nebulizer q 4-6 hours prn for dyspnea. Continue advair 2 puffs daily & continue sample of spiriva given to pt in office at last visit.      Advised pt to immediately go to ER if he develops worsening shortness of breath, wheezing, fever, chills, dehydration, coughing up blood, and discussed other concerning s/s.     Advised pt to sleep with head of bed raised. Advised pt to quit smoking. I advised Kermit of the risks of continuing to use tobacco. During this visit, I spent <3 minutes counseling the patient regarding tobacco cessation.     Elbow wrapped in office and topical mupirocin applied to elbow. Advised pt to continue topical mupirocin tid to left elbow. Advised pt to continue using voltaren gel for elbow pain.     Screening low dose ct chest ordered for screening for lung cancer.    Screening for AAA - [place] AAA limited ordered for one-time screening.    Patient educated to follow up sooner than next scheduled appointment if symptoms worsen or do not improve. Patient stated understanding and has agreed with plan of care. After visit summary was printed and given to patient.       This document has been electronically signed by Anjali Reyes PA-C on April 12, 2019 9:49 AM,.        Ear Cerumen Removal Instrumentation  Date/Time: 4/12/2019 8:16 AM  Performed by: Anjali Reyes PA-C  Authorized by: Anjali Reyes PA-C     Anesthesia:  Local Anesthetic: none  Location details: right ear and left ear  Patient tolerance: Patient tolerated the  procedure well with no immediate complications  Procedure type: irrigation   Sedation:  Patient sedated: no

## 2019-04-29 ENCOUNTER — OFFICE VISIT (OUTPATIENT)
Dept: FAMILY MEDICINE CLINIC | Facility: CLINIC | Age: 64
End: 2019-04-29

## 2019-04-29 ENCOUNTER — TELEPHONE (OUTPATIENT)
Dept: FAMILY MEDICINE CLINIC | Facility: CLINIC | Age: 64
End: 2019-04-29

## 2019-04-29 VITALS
BODY MASS INDEX: 18.96 KG/M2 | HEIGHT: 69 IN | OXYGEN SATURATION: 99 % | SYSTOLIC BLOOD PRESSURE: 120 MMHG | WEIGHT: 128 LBS | TEMPERATURE: 97.2 F | DIASTOLIC BLOOD PRESSURE: 82 MMHG | HEART RATE: 98 BPM

## 2019-04-29 DIAGNOSIS — J44.1 COPD WITH ACUTE EXACERBATION (HCC): Primary | ICD-10-CM

## 2019-04-29 DIAGNOSIS — Z99.81 REQUIRES SUPPLEMENTAL OXYGEN: ICD-10-CM

## 2019-04-29 DIAGNOSIS — M54.6 MIDLINE THORACIC BACK PAIN, UNSPECIFIED CHRONICITY: Primary | ICD-10-CM

## 2019-04-29 DIAGNOSIS — J96.12 CHRONIC RESPIRATORY FAILURE WITH HYPERCAPNIA (HCC): ICD-10-CM

## 2019-04-29 DIAGNOSIS — F17.210 TOBACCO DEPENDENCE DUE TO CIGARETTES: ICD-10-CM

## 2019-04-29 PROCEDURE — 96372 THER/PROPH/DIAG INJ SC/IM: CPT | Performed by: PHYSICIAN ASSISTANT

## 2019-04-29 PROCEDURE — 99214 OFFICE O/P EST MOD 30 MIN: CPT | Performed by: PHYSICIAN ASSISTANT

## 2019-04-29 RX ORDER — CEFTRIAXONE 1 G/1
1 INJECTION, POWDER, FOR SOLUTION INTRAMUSCULAR; INTRAVENOUS ONCE
Status: COMPLETED | OUTPATIENT
Start: 2019-04-29 | End: 2019-04-29

## 2019-04-29 RX ORDER — METHYLPREDNISOLONE 4 MG/1
TABLET ORAL
Qty: 1 EACH | Refills: 0 | Status: SHIPPED | OUTPATIENT
Start: 2019-04-29 | End: 2019-07-09

## 2019-04-29 RX ORDER — DOXYCYCLINE HYCLATE 100 MG/1
100 CAPSULE ORAL 2 TIMES DAILY
Qty: 20 CAPSULE | Refills: 0 | Status: SHIPPED | OUTPATIENT
Start: 2019-04-29 | End: 2019-07-09

## 2019-04-29 RX ADMIN — CEFTRIAXONE 1 G: 1 INJECTION, POWDER, FOR SOLUTION INTRAMUSCULAR; INTRAVENOUS at 10:10

## 2019-04-30 RX ORDER — IPRATROPIUM BROMIDE AND ALBUTEROL SULFATE 2.5; .5 MG/3ML; MG/3ML
SOLUTION RESPIRATORY (INHALATION)
Qty: 10 VIAL | Refills: 0 | Status: SHIPPED | OUTPATIENT
Start: 2019-04-30 | End: 2019-05-01

## 2019-05-01 ENCOUNTER — OFFICE VISIT (OUTPATIENT)
Dept: PULMONOLOGY | Facility: CLINIC | Age: 64
End: 2019-05-01

## 2019-05-01 VITALS
HEART RATE: 93 BPM | DIASTOLIC BLOOD PRESSURE: 64 MMHG | HEIGHT: 69 IN | BODY MASS INDEX: 18.82 KG/M2 | SYSTOLIC BLOOD PRESSURE: 99 MMHG | WEIGHT: 127.1 LBS | OXYGEN SATURATION: 95 %

## 2019-05-01 DIAGNOSIS — J30.1 ALLERGIC RHINITIS DUE TO POLLEN, UNSPECIFIED SEASONALITY: ICD-10-CM

## 2019-05-01 DIAGNOSIS — J42 CHRONIC BRONCHITIS, UNSPECIFIED CHRONIC BRONCHITIS TYPE (HCC): Primary | ICD-10-CM

## 2019-05-01 PROCEDURE — 96372 THER/PROPH/DIAG INJ SC/IM: CPT | Performed by: INTERNAL MEDICINE

## 2019-05-01 PROCEDURE — 99213 OFFICE O/P EST LOW 20 MIN: CPT | Performed by: INTERNAL MEDICINE

## 2019-05-01 RX ORDER — METHYLPREDNISOLONE ACETATE 40 MG/ML
80 INJECTION, SUSPENSION INTRA-ARTICULAR; INTRALESIONAL; INTRAMUSCULAR; SOFT TISSUE ONCE
Status: COMPLETED | OUTPATIENT
Start: 2019-05-01 | End: 2019-05-01

## 2019-05-01 RX ADMIN — METHYLPREDNISOLONE ACETATE 80 MG: 40 INJECTION, SUSPENSION INTRA-ARTICULAR; INTRALESIONAL; INTRAMUSCULAR; SOFT TISSUE at 13:13

## 2019-05-01 NOTE — PROGRESS NOTES
"This gentleman has COPD with allergic rhinitis and complains of head congestion for several days with copious amounts of rhinorrhea.  He has an antibiotic to take.    ROS    Constitutional-no night sweats weight loss headaches  GI no abdominal pain nausea or diarrhea  Neuro no seizure or neurologic deficits  Musculoskeletal no deformity or joint pain   no dysuria or hematuria  Skin no rash or other lesions  All other systems reviewed and were negative except for the above.      Physical Exam  BP 99/64   Pulse 93   Ht 175.3 cm (69\")   Wt 57.7 kg (127 lb 1.6 oz)   SpO2 95%   BMI 18.77 kg/m²   Vital signs as above  Pupils equally round and reactive to light and accommodation, neck no JVD or adenopathy.  Cardiovascular regular rhythm and rate no murmur or gallop.  Abdomen soft no organomegaly tenderness.  Extremities no clubbing cyanosis or edema.  No cervical adenopathy.  No skin rash.  Neurologic good strength bilaterally without deficits  Lungs reveal diminished breath sounds, nose is congested    Impression rhinitis and COPD    Plan Depo-Medrol 2 cc IM, continue present medications, return in 3 months        This document has been produced with the assistance of Dragon dictation  This document has been electronically signed by Aba Lee MD on May 1, 2019 1:08 PM      "

## 2019-07-09 ENCOUNTER — OFFICE VISIT (OUTPATIENT)
Dept: FAMILY MEDICINE CLINIC | Facility: CLINIC | Age: 64
End: 2019-07-09

## 2019-07-09 VITALS
HEART RATE: 96 BPM | OXYGEN SATURATION: 95 % | WEIGHT: 135 LBS | TEMPERATURE: 97.7 F | SYSTOLIC BLOOD PRESSURE: 128 MMHG | RESPIRATION RATE: 18 BRPM | HEIGHT: 69 IN | DIASTOLIC BLOOD PRESSURE: 78 MMHG | BODY MASS INDEX: 19.99 KG/M2

## 2019-07-09 DIAGNOSIS — J44.1 COPD WITH ACUTE EXACERBATION (HCC): Primary | ICD-10-CM

## 2019-07-09 DIAGNOSIS — Z99.81 REQUIRES SUPPLEMENTAL OXYGEN: ICD-10-CM

## 2019-07-09 DIAGNOSIS — F17.200 TOBACCO DEPENDENCE SYNDROME: ICD-10-CM

## 2019-07-09 PROCEDURE — 99214 OFFICE O/P EST MOD 30 MIN: CPT | Performed by: PHYSICIAN ASSISTANT

## 2019-07-09 PROCEDURE — 96372 THER/PROPH/DIAG INJ SC/IM: CPT | Performed by: PHYSICIAN ASSISTANT

## 2019-07-09 RX ORDER — LEVOFLOXACIN 500 MG/1
500 TABLET, FILM COATED ORAL DAILY
Qty: 7 TABLET | Refills: 0 | Status: SHIPPED | OUTPATIENT
Start: 2019-07-09 | End: 2019-10-22

## 2019-07-09 RX ORDER — TAMSULOSIN HYDROCHLORIDE 0.4 MG/1
1 CAPSULE ORAL NIGHTLY
COMMUNITY
End: 2019-11-11 | Stop reason: SDUPTHER

## 2019-07-09 RX ORDER — METHYLPREDNISOLONE ACETATE 80 MG/ML
80 INJECTION, SUSPENSION INTRA-ARTICULAR; INTRALESIONAL; INTRAMUSCULAR; SOFT TISSUE ONCE
Status: COMPLETED | OUTPATIENT
Start: 2019-07-09 | End: 2019-07-09

## 2019-07-09 RX ORDER — GUAIFENESIN 1200 MG/1
1200 TABLET, EXTENDED RELEASE ORAL 2 TIMES DAILY
Qty: 60 EACH | Refills: 0 | Status: SHIPPED | OUTPATIENT
Start: 2019-07-09 | End: 2019-10-22

## 2019-07-09 RX ORDER — AMITRIPTYLINE HYDROCHLORIDE 50 MG/1
TABLET, FILM COATED ORAL
Refills: 5 | COMMUNITY
Start: 2019-06-28 | End: 2019-07-09 | Stop reason: SDUPTHER

## 2019-07-09 RX ORDER — METHYLPREDNISOLONE 4 MG/1
TABLET ORAL
Qty: 1 EACH | Refills: 0 | Status: SHIPPED | OUTPATIENT
Start: 2019-07-09 | End: 2019-07-15

## 2019-07-09 RX ADMIN — METHYLPREDNISOLONE ACETATE 80 MG: 80 INJECTION, SUSPENSION INTRA-ARTICULAR; INTRALESIONAL; INTRAMUSCULAR; SOFT TISSUE at 10:43

## 2019-07-09 NOTE — PROGRESS NOTES
Subjective   Kermit Corley is a 64 y.o. male.     Shortness of Breath   This is a chronic problem. The current episode started more than 1 year ago. The problem occurs constantly. The problem has been gradually worsening. Associated symptoms include ear pain (bilateral), headaches (frontal), orthopnea, rhinorrhea, a sore throat, sputum production and wheezing. Pertinent negatives include no abdominal pain, chest pain, claudication, coryza, fever, hemoptysis, leg pain, leg swelling, neck pain, PND, rash, swollen glands, syncope or vomiting. The symptoms are aggravated by any activity, lying flat and weather changes. Risk factors include smoking. He has tried beta agonist inhalers, ipratropium inhalers, leukotriene antagonists and steroid inhalers for the symptoms. The treatment provided mild relief.   COPD   This is a chronic problem. The current episode started more than 1 year ago. The problem occurs constantly. The problem has been gradually worsening. Associated symptoms include congestion, coughing, fatigue, headaches (frontal), a sore throat and weakness (generalized). Pertinent negatives include no abdominal pain, anorexia, arthralgias, change in bowel habit, chest pain, chills, diaphoresis, fever, joint swelling, myalgias, nausea, neck pain, numbness, rash, swollen glands, urinary symptoms, vertigo, visual change or vomiting. The symptoms are aggravated by exertion, smoking, stress and walking.      Pt presents today for a sick visit, feeling unwell x 4-5 days. Pt admits to increased shortness of breath from baseline, productive cough (purulent), rhinorrhea, pleuritic chest pain, bilateral ear pressure, frontal headache, wheezing worsened from baseline, increased orthopnea from baseline. Denies fever, body aches, chills. Pt reports using albuterol inhaler q 4 hours, advair inhaler bid, singulair, incruse ellipta with minimal improvement in sx. Pt reports using albuterol inhaler more than normal. Pt  states he is on continuous supplemental oxygen 2L. Admits to smoking hx of 45 years, smoking 1ppd. Rates sx 9/10 today.    The following portions of the patient's history were reviewed and updated as appropriate: allergies, current medications, past family history, past medical history, past social history, past surgical history and problem list.    Review of Systems   Constitutional: Positive for fatigue. Negative for activity change, appetite change, chills, diaphoresis, fever, unexpected weight gain and unexpected weight loss.   HENT: Positive for congestion, ear pain (bilateral), postnasal drip, rhinorrhea, sinus pressure and sore throat. Negative for dental problem, drooling, ear discharge, sneezing, tinnitus, trouble swallowing and voice change.    Eyes: Negative for blurred vision, double vision and visual disturbance.   Respiratory: Positive for cough, sputum production, shortness of breath and wheezing. Negative for apnea, hemoptysis, choking, chest tightness and stridor.    Cardiovascular: Positive for orthopnea. Negative for chest pain, palpitations, claudication, leg swelling, syncope and PND.   Gastrointestinal: Negative for abdominal distention, abdominal pain, anorexia, change in bowel habit, constipation, diarrhea, nausea, vomiting, GERD and indigestion.   Musculoskeletal: Negative for arthralgias, joint swelling, myalgias, neck pain and neck stiffness.   Skin: Negative.  Negative for rash.   Neurological: Positive for weakness (generalized) and headache (sinus). Negative for dizziness, vertigo, light-headedness, numbness and confusion.   Psychiatric/Behavioral: Negative.        Objective   Physical Exam   Constitutional: He is oriented to person, place, and time. Vital signs are normal. He appears well-developed and well-nourished. He is active and cooperative.  Non-toxic appearance. He has a sickly appearance. No distress.   Appears frail   HENT:   Head: Normocephalic and atraumatic.   Right Ear:  Tympanic membrane, external ear and ear canal normal. No drainage, swelling or tenderness. Tympanic membrane is not bulging. No middle ear effusion. Decreased hearing is noted. cerumen impaction is not present.  Left Ear: Tympanic membrane, external ear and ear canal normal. No drainage, swelling or tenderness. Tympanic membrane is not bulging.  No middle ear effusion. Decreased hearing is noted. An impacted cerumen is not present.  Nose: Mucosal edema, rhinorrhea and congestion present. Right sinus exhibits no maxillary sinus tenderness and no frontal sinus tenderness. Left sinus exhibits no maxillary sinus tenderness and no frontal sinus tenderness.   Mouth/Throat: Uvula is midline and mucous membranes are normal. Mucous membranes are not pale, not dry and not cyanotic. Posterior oropharyngeal erythema (mild) present. No oropharyngeal exudate, posterior oropharyngeal edema or tonsillar abscesses. Tonsils are 0 on the right. Tonsils are 0 on the left. No tonsillar exudate.   Eyes: Conjunctivae and EOM are normal. Pupils are equal, round, and reactive to light.   Neck: Normal range of motion. Neck supple.   Cardiovascular: Normal rate, regular rhythm, normal heart sounds and intact distal pulses. Exam reveals no gallop and no friction rub.   No murmur heard.  Pulmonary/Chest: No stridor. No apnea and no tachypnea. No respiratory distress. He has decreased breath sounds (diffuse, bilateral). He has wheezes (bilateral, diffuse). He has no rhonchi. He has no rales. He exhibits no tenderness.   Supplemental oxygen by nasal cannula in place.   Abdominal: Soft. Bowel sounds are normal. He exhibits no distension. There is no tenderness. There is no guarding.   Musculoskeletal:   Ambulating with cane   Lymphadenopathy:     He has no cervical adenopathy.   Neurological: He is alert and oriented to person, place, and time.   Skin: Skin is warm and dry. Capillary refill takes less than 2 seconds. No rash noted. He is not  diaphoretic. No erythema. No pallor.   Psychiatric: He has a normal mood and affect. His behavior is normal. Judgment and thought content normal.   Nursing note and vitals reviewed.    Vitals:    07/09/19 0952   BP: 128/78   Pulse: 96   Resp: 18   Temp: 97.7 °F (36.5 °C)   SpO2: 95%    PHQ-2 Depression Screening  Little interest or pleasure in doing things? 0   Feeling down, depressed, or hopeless? 0   PHQ-2 Total Score 0        Assessment/Plan   Kermit was seen today for shortness of breath.    Diagnoses and all orders for this visit:    COPD with acute exacerbation (CMS/[facility])  -     methylPREDNISolone acetate (DEPO-medrol) injection 80 mg  -     methylPREDNISolone (MEDROL, ELISE,) 4 MG tablet; Take as directed on package instructions.  -     guaiFENesin ER (MUCINEX MAXIMUM STRENGTH) 1200 MG tablet sustained-release 12 hour; Take 1,200 mg by mouth 2 (Two) Times a Day.  -     levoFLOXacin (LEVAQUIN) 500 MG tablet; Take 1 tablet by mouth Daily.      Acute copd exacerbation - continue albuterol inhaler q 4 hours, advair inhaler bid, singulair, incruse ellipta. Depo-medrol IM injection as above given to pt in office. Begin medrol dose elise, mucinex, and levaquin as above. Discussed black box warnings of levaquin in office with pt and increased risk for tendonitis/tendon rupture, neuropathy, and additional adverse side effects. Pt verbalized understanding. Advised pt to increase fluid intake & maintain good hand hygiene. Discussed sleeping propped up for improvement in dyspnea. Discussed compliance with wearing supplemental oxygen. Advised pt to f/u in 5-6 days for recheck.     I advised Kermit of the risks of continuing to use tobacco, and I provided him with tobacco cessation educational materials in the After Visit Summary.     Discussed concerning s/s, worsening sx in office with pt to go to ER for evaluation and treatment.     During this visit, I spent <3 minutes counseling the patient regarding tobacco cessation. Pt  declines interest in tobacco cessation.     Patient educated to follow up sooner than next scheduled appointment if symptoms worsen or do not improve. Patient stated understanding and has agreed with plan of care. After visit summary was printed and given to patient.         This document has been electronically signed by Anjali Reyes PA-C on July 9, 2019 1:03 PM,.

## 2019-07-15 ENCOUNTER — OFFICE VISIT (OUTPATIENT)
Dept: FAMILY MEDICINE CLINIC | Facility: CLINIC | Age: 64
End: 2019-07-15

## 2019-07-15 VITALS
BODY MASS INDEX: 19.99 KG/M2 | TEMPERATURE: 98.4 F | HEIGHT: 69 IN | WEIGHT: 135 LBS | SYSTOLIC BLOOD PRESSURE: 124 MMHG | DIASTOLIC BLOOD PRESSURE: 76 MMHG | RESPIRATION RATE: 18 BRPM | OXYGEN SATURATION: 99 % | HEART RATE: 75 BPM

## 2019-07-15 DIAGNOSIS — J44.9 COPD, SEVERE (HCC): Primary | ICD-10-CM

## 2019-07-15 DIAGNOSIS — Z13.29 SCREENING FOR THYROID DISORDER: ICD-10-CM

## 2019-07-15 DIAGNOSIS — E83.42 HYPOMAGNESEMIA: ICD-10-CM

## 2019-07-15 DIAGNOSIS — Z12.5 SCREENING FOR MALIGNANT NEOPLASM OF PROSTATE: ICD-10-CM

## 2019-07-15 DIAGNOSIS — I10 ESSENTIAL HYPERTENSION: ICD-10-CM

## 2019-07-15 DIAGNOSIS — F51.01 PRIMARY INSOMNIA: ICD-10-CM

## 2019-07-15 DIAGNOSIS — Z00.00 MEDICARE ANNUAL WELLNESS VISIT, SUBSEQUENT: ICD-10-CM

## 2019-07-15 DIAGNOSIS — Z11.59 NEED FOR HEPATITIS C SCREENING TEST: ICD-10-CM

## 2019-07-15 PROCEDURE — G0439 PPPS, SUBSEQ VISIT: HCPCS | Performed by: PHYSICIAN ASSISTANT

## 2019-07-15 PROCEDURE — 99213 OFFICE O/P EST LOW 20 MIN: CPT | Performed by: PHYSICIAN ASSISTANT

## 2019-07-15 RX ORDER — ASPIRIN 81 MG/1
81 TABLET ORAL DAILY
COMMUNITY

## 2019-07-15 RX ORDER — AMITRIPTYLINE HYDROCHLORIDE 100 MG/1
100 TABLET, FILM COATED ORAL NIGHTLY
Qty: 90 TABLET | Refills: 0 | Status: SHIPPED | OUTPATIENT
Start: 2019-07-15 | End: 2019-11-11 | Stop reason: SDUPTHER

## 2019-07-15 RX ORDER — MONTELUKAST SODIUM 10 MG/1
10 TABLET ORAL NIGHTLY
Qty: 30 TABLET | Refills: 5 | Status: SHIPPED | OUTPATIENT
Start: 2019-07-15 | End: 2019-11-04 | Stop reason: SDUPTHER

## 2019-07-15 NOTE — PROGRESS NOTES
Subjective   Kermit Corley is a 64 y.o. male.     History of Present Illness   Pt presents today for f/u on acute copd exacerbation. Pt admits to feeling significant improved. Admits to significant improvement in shortness of breath, cough. Admits to occasional cough (purulent), mild rhinorrhea, mild bilateral ear pressure. Admits to significant improvement in wheezing from previous visit. Denies pleuritic chest pain. Pt currently on continuous supplemental oxygen 2L. Admits to smoking hx of 45 years, smoking 1ppd. Rates sx 4/10 today. Admits to improvement in orthopnea. Pt reports using albuterol inhaler q 5 hours, advair inhaler bid, singulair, incruse ellipta.     COPD, chronic respiratory failure with hypercapnia - managed with albuterol, advair, singulair, incruse ellipta, 2L continuous supplemental oxygen. Followed by Dr. Lee pulmonology .     Tobacco dependence due to cigarettes - x 50 years, 1ppd.     Hx tachyarrhythmias, chest pain, htn - followed by Dr. Waite, Cardiology managed with bystolic, daily 81mg ASA, nitrostat prn.    Insomnia/anxiety - managed with elavil.    BPH - managed with flomax.    The following portions of the patient's history were reviewed and updated as appropriate: allergies, current medications, past family history, past medical history, past social history, past surgical history and problem list.    Review of Systems   Constitutional: Positive for fatigue. Negative for activity change, appetite change, chills, diaphoresis, fever, unexpected weight gain and unexpected weight loss.   HENT: Positive for sore throat (mild). Negative for congestion, dental problem, drooling, ear discharge, ear pain, facial swelling, hearing loss, mouth sores, nosebleeds, postnasal drip, rhinorrhea, sinus pressure, sneezing, tinnitus, trouble swallowing and voice change.    Eyes: Negative for blurred vision, double vision, pain and visual disturbance.   Respiratory: Positive for cough  (improving), shortness of breath (improving, slightly worse than baseline) and wheezing. Negative for apnea, choking, chest tightness and stridor.    Cardiovascular: Negative for chest pain, palpitations and leg swelling.   Gastrointestinal: Negative for abdominal distention, abdominal pain, constipation, diarrhea, nausea, vomiting, GERD and indigestion.   Endocrine: Negative.    Genitourinary: Negative for dysuria.   Musculoskeletal: Positive for arthralgias and gait problem (ambulates with cane). Negative for myalgias and neck pain.   Skin: Negative for color change, dry skin, pallor, rash, skin lesions and bruise.   Neurological: Negative for dizziness, weakness, light-headedness, headache and confusion.   Psychiatric/Behavioral: Negative.        Objective   Physical Exam   Constitutional: He is oriented to person, place, and time. Vital signs are normal. He appears well-developed and well-nourished. He appears cachectic. He is active and cooperative.  Non-toxic appearance. He does not have a sickly appearance. He does not appear ill. No distress.   Appears frail   HENT:   Head: Normocephalic and atraumatic.   Right Ear: Tympanic membrane, external ear and ear canal normal. No drainage, swelling or tenderness. Tympanic membrane is not bulging. No middle ear effusion. Decreased hearing is noted. cerumen impaction is present.  Left Ear: Tympanic membrane, external ear and ear canal normal. No drainage, swelling or tenderness. Tympanic membrane is not bulging.  No middle ear effusion. Decreased hearing is noted. An impacted cerumen is present.  Nose: No mucosal edema, rhinorrhea or congestion. Right sinus exhibits no maxillary sinus tenderness and no frontal sinus tenderness. Left sinus exhibits no maxillary sinus tenderness and no frontal sinus tenderness.   Mouth/Throat: Uvula is midline and mucous membranes are normal. Mucous membranes are not pale, not dry and not cyanotic. Posterior oropharyngeal erythema  (mild) present. No oropharyngeal exudate, posterior oropharyngeal edema or tonsillar abscesses. Tonsils are 0 on the right. Tonsils are 0 on the left. No tonsillar exudate.   Eyes: Conjunctivae and EOM are normal. Pupils are equal, round, and reactive to light.   Neck: Normal range of motion. Neck supple.   Cardiovascular: Normal rate, regular rhythm, normal heart sounds and intact distal pulses. Exam reveals no gallop and no friction rub.   No murmur heard.  Pulmonary/Chest: Effort normal. No accessory muscle usage or stridor. No apnea and no tachypnea. No respiratory distress. He has decreased breath sounds (diffuse, bilateral). He has wheezes (bilateral, diffuse). He has no rhonchi. He has no rales. He exhibits no tenderness.   Supplemental oxygen by nasal cannula in place.   Abdominal: Soft. Bowel sounds are normal. He exhibits no distension. There is no tenderness. There is no guarding.   Musculoskeletal:        Left elbow: He exhibits decreased range of motion. He exhibits no swelling, no effusion, no deformity and no laceration. Tenderness found.   Ambulating with cane   Lymphadenopathy:     He has no cervical adenopathy.   Neurological: He is alert and oriented to person, place, and time.   Skin: Skin is warm and dry. Capillary refill takes less than 2 seconds. No rash noted. He is not diaphoretic. No erythema. No pallor.   Psychiatric: He has a normal mood and affect. His behavior is normal. Judgment and thought content normal.   Nursing note and vitals reviewed.    Vitals:    07/15/19 0942   BP: 124/76   Pulse: 75   Resp: 18   Temp: 98.4 °F (36.9 °C)   SpO2: 99%   PHQ-2 Depression Screening  Little interest or pleasure in doing things? 0   Feeling down, depressed, or hopeless? 0   PHQ-2 Total Score 0         Assessment/Plan   Kermit was seen today for follow-up.    Diagnoses and all orders for this visit:    COPD, severe (CMS/HCC)  -     montelukast (SINGULAIR) 10 MG tablet; Take 1 tablet by mouth Every  Night.    Primary insomnia  -     amitriptyline (ELAVIL) 100 MG tablet; Take 1 tablet by mouth Every Night.    Need for hepatitis C screening test  -     Hepatitis panel, acute; Future    Hypomagnesemia  -     Magnesium; Future    Screening for malignant neoplasm of prostate  -     PSA Screen; Future    Screening for thyroid disorder  -     TSH; Future  -     T4, free; Future    Essential hypertension  -     CBC & Differential; Future  -     Comprehensive metabolic panel; Future  -     Lipid panel; Future    Baseline fasting labs as above ordered for evaluation and assessment of chronic conditions. Due. Advised pt to have labs performed at his earliest convenience.    COPD, chronic respiratory failure with hypercapnia - managed with albuterol, advair, singulair, incruse ellipta, 2L continuous supplemental oxygen. Followed by Dr. Lee pulmonology .  Refills on singulair as above sent to pharmacy.     Tobacco dependence due to cigarettes - x 50 years, 1ppd.     Hx tachyarrhythmias, chest pain, htn - followed by Dr. Waite, Cardiology managed with bystolic, daily 81mg ASA, nitrostat prn.    Insomnia/anxiety - well controlled, managed with elavil.    BPH - controlled, managed with flomax.    Medicare annual wellness performed today.     Patient educated to follow up in 6 months or sooner than next scheduled appointment if symptoms worsen or do not improve. Patient stated understanding and has agreed with plan of care. After visit summary was printed and given to patient.      This document has been electronically signed by Anjali Reyes PA-C on July 22, 2019 8:53 AM,.

## 2019-07-15 NOTE — PROGRESS NOTES
QUICK REFERENCE INFORMATION:  The ABCs of the Annual Wellness Visit    Subsequent Medicare Wellness Visit     HEALTH RISK ASSESSMENT    : 1955    Recent Hospitalizations:  Recently treated at the following:  Other: [facility].  [facility]      Current Medical Providers:  Patient Care Team:  Maribel Barclay MD as PCP - General (Family Medicine)  [place], Trey Paiz MD as PCP - Claims Attributed        Smoking Status:  Social History     Tobacco Use   Smoking Status Light Tobacco Smoker   • Packs/day: 2.00   • Years: 30.00   • Pack years: 60.00   • Types: Cigarettes   Tobacco Comment    2 ppd for 30 years       Alcohol Consumption:  Social History     Substance and Sexual Activity   Alcohol Use No       Depression Screen:   PHQ-2/PHQ-9 Depression Screening 7/15/2019   Little interest or pleasure in doing things 0   Feeling down, depressed, or hopeless 0   Trouble falling or staying asleep, or sleeping too much -   Feeling tired or having little energy -   Poor appetite or overeating -   Feeling bad about yourself - or that you are a failure or have let yourself or your family down -   Trouble concentrating on things, such as reading the newspaper or watching television -   Moving or speaking so slowly that other people could have noticed. Or the opposite - being so fidgety or restless that you have been moving around a lot more than usual -   Thoughts that you would be better off dead, or of hurting yourself in some way -   Total Score 0   If you checked off any problems, how difficult have these problems made it for you to do your work, take care of things at home, or get along with other people? -       Health Habits and Functional and Cognitive Screening:  No flowsheet data found.        Does the patient have evidence of cognitive impairment? No    Asiprin use counseling: Taking ASA appropriately as indicated      Recent Lab Results:    Lab Results   Component Value Date     (H) 2018        Lab Results    Component Value Date    TRIG 83 12/07/2016    HDL 47.3 12/07/2016           Age-appropriate Screening Schedule:  Refer to the list below for future screening recommendations based on patient's age, sex and/or medical conditions. Orders for these recommended tests are listed in the plan section. The patient has been provided with a written plan.    Health Maintenance   Topic Date Due   • ZOSTER VACCINE (1 of 2) 04/08/2003   • TDAP/TD VACCINES (2 - Td) 03/28/2016   • INFLUENZA VACCINE  08/01/2019   • PNEUMOCOCCAL VACCINES (65+ LOW/MEDIUM RISK) (2 of 2 - PCV13) 02/24/2020   • COLONOSCOPY  10/26/2022        Subjective   History of Present Illness    Kermit Corley is a 64 y.o. male who presents for an Annual Wellness Visit.    The following portions of the patient's history were reviewed and updated as appropriate: allergies, current medications, past family history, past medical history, past social history, past surgical history and problem list.    Outpatient Medications Prior to Visit   Medication Sig Dispense Refill   • aspirin 81 MG EC tablet Take 81 mg by mouth Daily.     • albuterol sulfate  (90 Base) MCG/ACT inhaler Inhale 2 puffs Every 6 (Six) Hours As Needed for Wheezing or Shortness of Air. 1 inhaler 11   • diclofenac (VOLTAREN) 1 % gel gel Apply 4 g topically to the appropriate area as directed 4 (Four) Times a Day. Elbow pain 100 g 0   • fluticasone-salmeterol (ADVAIR DISKUS) 500-50 MCG/DOSE DISKUS Inhale 1 puff 2 (Two) Times a Day.     • guaiFENesin ER (MUCINEX MAXIMUM STRENGTH) 1200 MG tablet sustained-release 12 hour Take 1,200 mg by mouth 2 (Two) Times a Day. 60 each 0   • ipratropium-albuterol (DUO-NEB) 0.5-2.5 mg/3 ml nebulizer Take 3 mL by nebulization 4 (Four) Times a Day. 360 mL 0   • levoFLOXacin (LEVAQUIN) 500 MG tablet Take 1 tablet by mouth Daily. 7 tablet 0   • nebivolol (BYSTOLIC) 2.5 MG tablet Take 2.5 mg by mouth Daily.     • nitroglycerin (NITROSTAT) 0.4 MG SL tablet  Place 0.4 mg under the tongue. 1 tablet under the tongue every 5 (five) minutes as needed for Chest pain     • tamsulosin (FLOMAX) 0.4 MG capsule 24 hr capsule Take 1 capsule by mouth Every Night.     • Umeclidinium Bromide 62.5 MCG/INH aerosol powder  Inhale 1 puff Daily. 1 each 5   • amitriptyline (ELAVIL) 100 MG tablet Take 1 tablet by mouth Every Night. 90 tablet 0   • methylPREDNISolone (MEDROL, ELISE,) 4 MG tablet Take as directed on package instructions. 1 each 0   • montelukast (SINGULAIR) 10 MG tablet      • mupirocin (BACTROBAN) 2 % ointment Apply  topically to the appropriate area as directed 3 (Three) Times a Day. 15 g 0     No facility-administered medications prior to visit.        Patient Active Problem List   Diagnosis   • Tobacco dependence syndrome   • Insomnia   • Bilateral edema of lower extremity   • COPD, severe (CMS/HCC)   • Asthma   • Chronic bronchitis with COPD (chronic obstructive pulmonary disease) (CMS/HCC)   • Chronic respiratory failure with hypercapnia (CMS/HCC)   • Hernia of abdominal cavity   • Hypomagnesemia   • Pulmonary nodule   • COPD (chronic obstructive pulmonary disease) (CMS/HCC)   • Asthma-chronic obstructive pulmonary disease overlap syndrome (CMS/HCC)   • Anxiety   • Grief   • Hypokalemia   • Requires supplemental oxygen       Advance Care Planning:  Patient does not have an advance directive - information provided to the patient today    Identification of Risk Factors:  Risk factors include: Abdominal Aortic Aneurysm Screening  Advance Directive Discussion  Cardiovascular risk  Immunizations Discussed/Encouraged (specific immunizations; Influenza, Pneumococcal 23, Prevnar and Shingrix ).    Review of Systems   Constitutional: Positive for fatigue (chronic). Negative for activity change, appetite change, chills, diaphoresis, fever and unexpected weight change.   Eyes: Negative for pain and visual disturbance.   Respiratory: Positive for cough (chronic), shortness of breath  (chronic, baseline) and wheezing (chronic, baseline). Negative for apnea, choking, chest tightness and stridor.    Cardiovascular: Negative for chest pain, palpitations and leg swelling.   Gastrointestinal: Negative for abdominal distention, abdominal pain, constipation, diarrhea, nausea and vomiting.   Endocrine: Negative.    Genitourinary: Negative.    Musculoskeletal: Positive for arthralgias and gait problem (ambulates with cane). Negative for back pain, joint swelling, myalgias, neck pain and neck stiffness.   Skin: Negative.    Psychiatric/Behavioral: Negative.        Compared to one year ago, the patient feels his physical health is the same.  Compared to one year ago, the patient feels his mental health is the same.    Objective     Physical Exam   Constitutional: He is oriented to person, place, and time. Vital signs are normal. He appears well-developed and well-nourished. He appears cachectic. He is active and cooperative.  Non-toxic appearance. No distress.   Appears frail.   HENT:   Head: Normocephalic and atraumatic.   Right Ear: Tympanic membrane, external ear and ear canal normal. Decreased hearing is noted.   Left Ear: Tympanic membrane, external ear and ear canal normal. Decreased hearing is noted.   Nose: Nose normal. No rhinorrhea.   Mouth/Throat: Uvula is midline and mucous membranes are normal. No oropharyngeal exudate or posterior oropharyngeal edema. Tonsils are 0 on the right. Tonsils are 0 on the left. No tonsillar exudate.   Eyes: Conjunctivae and EOM are normal. Pupils are equal, round, and reactive to light.   Neck: Normal range of motion. Neck supple.   Cardiovascular: Normal rate, regular rhythm and normal heart sounds. Exam reveals no gallop and no friction rub.   No murmur heard.  Pulmonary/Chest: Effort normal. No stridor. No respiratory distress. He has decreased breath sounds (bilaterally, diffuse). He has wheezes (bilateral, diffuse). He has no rhonchi. He has no rales. He  "exhibits no tenderness.   Abdominal: Soft. Bowel sounds are normal.   Musculoskeletal: Normal range of motion. He exhibits no edema or tenderness.   Ambulates with cane.   Lymphadenopathy:     He has no cervical adenopathy.   Neurological: He is alert and oriented to person, place, and time.   Skin: Skin is warm and dry. Capillary refill takes less than 2 seconds. No rash noted. He is not diaphoretic. No erythema. No pallor.   Psychiatric: He has a normal mood and affect. His behavior is normal. Judgment and thought content normal.   Nursing note and vitals reviewed.      Vitals:    07/15/19 0942   BP: 124/76   BP Location: Left arm   Patient Position: Sitting   Pulse: 75   Resp: 18   Temp: 98.4 °F (36.9 °C)   SpO2: 99%   Weight: 61.2 kg (135 lb)   Height: 175.3 cm (69.02\")   PainSc: 0-No pain     PHQ-2 Depression Screening  Little interest or pleasure in doing things? 0   Feeling down, depressed, or hopeless? 0   PHQ-2 Total Score 0        Patient's Body mass index is 19.93 kg/m². BMI is within normal parameters. No follow-up required..      Assessment/Plan   Patient Self-Management and Personalized Health Advice  The patient has been provided with information about: prevention of cardiac or vascular disease, tobacco cessation, fall prevention and designing advance directives and preventive services including:   · Advance directive, Diabetes screening, see lab orders, Fall Risk assessment done, Nutrition counseling provided.    Visit Diagnoses:    ICD-10-CM ICD-9-CM   1. COPD, severe (CMS/HCC) J44.9 496   2. Primary insomnia F51.01 307.42   3. Need for hepatitis C screening test Z11.59 V73.89   4. Hypomagnesemia E83.42 275.2   5. Screening for malignant neoplasm of prostate Z12.5 V76.44   6. Screening for thyroid disorder Z13.29 V77.0   7. Essential hypertension I10 401.9       Orders Placed This Encounter   Procedures   • Comprehensive metabolic panel     Standing Status:   Future     Standing Expiration Date:   " 7/15/2020   • Lipid panel     Standing Status:   Future     Standing Expiration Date:   7/15/2020   • TSH     Standing Status:   Future     Standing Expiration Date:   7/15/2020   • T4, free     Standing Status:   Future     Standing Expiration Date:   7/15/2020   • PSA Screen     Standing Status:   Future     Standing Expiration Date:   7/15/2020   • Magnesium     Standing Status:   Future     Standing Expiration Date:   7/15/2020   • Hepatitis panel, acute     Standing Status:   Future     Standing Expiration Date:   7/15/2020   • CBC & Differential     Standing Status:   Future     Standing Expiration Date:   7/15/2020     Order Specific Question:   Manual Differential     Answer:   No       Outpatient Encounter Medications as of 7/15/2019   Medication Sig Dispense Refill   • aspirin 81 MG EC tablet Take 81 mg by mouth Daily.     • albuterol sulfate  (90 Base) MCG/ACT inhaler Inhale 2 puffs Every 6 (Six) Hours As Needed for Wheezing or Shortness of Air. 1 inhaler 11   • amitriptyline (ELAVIL) 100 MG tablet Take 1 tablet by mouth Every Night. 90 tablet 0   • diclofenac (VOLTAREN) 1 % gel gel Apply 4 g topically to the appropriate area as directed 4 (Four) Times a Day. Elbow pain 100 g 0   • fluticasone-salmeterol (ADVAIR DISKUS) 500-50 MCG/DOSE DISKUS Inhale 1 puff 2 (Two) Times a Day.     • guaiFENesin ER (MUCINEX MAXIMUM STRENGTH) 1200 MG tablet sustained-release 12 hour Take 1,200 mg by mouth 2 (Two) Times a Day. 60 each 0   • ipratropium-albuterol (DUO-NEB) 0.5-2.5 mg/3 ml nebulizer Take 3 mL by nebulization 4 (Four) Times a Day. 360 mL 0   • levoFLOXacin (LEVAQUIN) 500 MG tablet Take 1 tablet by mouth Daily. 7 tablet 0   • montelukast (SINGULAIR) 10 MG tablet Take 1 tablet by mouth Every Night. 30 tablet 5   • nebivolol (BYSTOLIC) 2.5 MG tablet Take 2.5 mg by mouth Daily.     • nitroglycerin (NITROSTAT) 0.4 MG SL tablet Place 0.4 mg under the tongue. 1 tablet under the tongue every 5 (five) minutes as  needed for Chest pain     • tamsulosin (FLOMAX) 0.4 MG capsule 24 hr capsule Take 1 capsule by mouth Every Night.     • Umeclidinium Bromide 62.5 MCG/INH aerosol powder  Inhale 1 puff Daily. 1 each 5   • [DISCONTINUED] amitriptyline (ELAVIL) 100 MG tablet Take 1 tablet by mouth Every Night. 90 tablet 0   • [DISCONTINUED] methylPREDNISolone (MEDROL, ELISE,) 4 MG tablet Take as directed on package instructions. 1 each 0   • [DISCONTINUED] montelukast (SINGULAIR) 10 MG tablet      • [DISCONTINUED] mupirocin (BACTROBAN) 2 % ointment Apply  topically to the appropriate area as directed 3 (Three) Times a Day. 15 g 0     No facility-administered encounter medications on file as of 7/15/2019.        Reviewed use of high risk medication in the elderly: yes  Reviewed for potential of harmful drug interactions in the elderly: yes    Follow Up:  Return in about 6 months (around 1/15/2020).     An After Visit Summary and PPPS with all of these plans were given to the patient.

## 2019-07-23 ENCOUNTER — LAB (OUTPATIENT)
Dept: LAB | Facility: OTHER | Age: 64
End: 2019-07-23

## 2019-07-23 DIAGNOSIS — Z11.59 NEED FOR HEPATITIS C SCREENING TEST: ICD-10-CM

## 2019-07-23 DIAGNOSIS — E83.42 HYPOMAGNESEMIA: ICD-10-CM

## 2019-07-23 DIAGNOSIS — Z13.29 SCREENING FOR THYROID DISORDER: ICD-10-CM

## 2019-07-23 DIAGNOSIS — E78.00 ELEVATED LDL CHOLESTEROL LEVEL: ICD-10-CM

## 2019-07-23 DIAGNOSIS — Z12.5 SCREENING FOR MALIGNANT NEOPLASM OF PROSTATE: ICD-10-CM

## 2019-07-23 DIAGNOSIS — Z91.89 CANDIDATE FOR STATIN THERAPY DUE TO RISK OF FUTURE CARDIOVASCULAR EVENT: Primary | ICD-10-CM

## 2019-07-23 DIAGNOSIS — I10 ESSENTIAL HYPERTENSION: ICD-10-CM

## 2019-07-23 LAB
ALBUMIN SERPL-MCNC: 4.1 G/DL (ref 3.5–5)
ALBUMIN/GLOB SERPL: 1.5 G/DL (ref 1.1–1.8)
ALP SERPL-CCNC: 107 U/L (ref 38–126)
ALT SERPL W P-5'-P-CCNC: 18 U/L
ANION GAP SERPL CALCULATED.3IONS-SCNC: 11 MMOL/L (ref 5–15)
AST SERPL-CCNC: 21 U/L (ref 17–59)
BASOPHILS # BLD AUTO: 0.02 10*3/MM3 (ref 0–0.2)
BASOPHILS NFR BLD AUTO: 0.2 % (ref 0–1.5)
BILIRUB SERPL-MCNC: 0.2 MG/DL (ref 0.2–1.3)
BUN BLD-MCNC: 12 MG/DL (ref 7–23)
BUN/CREAT SERPL: 15.6 (ref 7–25)
CALCIUM SPEC-SCNC: 9.2 MG/DL (ref 8.4–10.2)
CHLORIDE SERPL-SCNC: 105 MMOL/L (ref 101–112)
CHOLEST SERPL-MCNC: 192 MG/DL (ref 150–200)
CO2 SERPL-SCNC: 29 MMOL/L (ref 22–30)
CREAT BLD-MCNC: 0.77 MG/DL (ref 0.7–1.3)
DEPRECATED RDW RBC AUTO: 44.4 FL (ref 37–54)
EOSINOPHIL # BLD AUTO: 0.17 10*3/MM3 (ref 0–0.4)
EOSINOPHIL NFR BLD AUTO: 1.9 % (ref 0.3–6.2)
ERYTHROCYTE [DISTWIDTH] IN BLOOD BY AUTOMATED COUNT: 13.5 % (ref 12.3–15.4)
GFR SERPL CREATININE-BSD FRML MDRD: 101 ML/MIN/1.73 (ref 49–113)
GLOBULIN UR ELPH-MCNC: 2.8 GM/DL (ref 2.3–3.5)
GLUCOSE BLD-MCNC: 85 MG/DL (ref 70–99)
HAV IGM SERPL QL IA: NORMAL
HBV CORE IGM SERPL QL IA: NORMAL
HBV SURFACE AG SERPL QL IA: NORMAL
HCT VFR BLD AUTO: 43.1 % (ref 37.5–51)
HCV AB SER DONR QL: NORMAL
HDLC SERPL-MCNC: 56 MG/DL (ref 40–59)
HGB BLD-MCNC: 14.1 G/DL (ref 13–17.7)
LDLC SERPL CALC-MCNC: 114 MG/DL
LDLC/HDLC SERPL: 2.03 {RATIO} (ref 0–3.55)
LYMPHOCYTES # BLD AUTO: 1.68 10*3/MM3 (ref 0.7–3.1)
LYMPHOCYTES NFR BLD AUTO: 19 % (ref 19.6–45.3)
MAGNESIUM SERPL-MCNC: 2.4 MG/DL (ref 1.6–2.3)
MCH RBC QN AUTO: 30.5 PG (ref 26.6–33)
MCHC RBC AUTO-ENTMCNC: 32.7 G/DL (ref 31.5–35.7)
MCV RBC AUTO: 93.1 FL (ref 79–97)
MONOCYTES # BLD AUTO: 0.79 10*3/MM3 (ref 0.1–0.9)
MONOCYTES NFR BLD AUTO: 9 % (ref 5–12)
NEUTROPHILS # BLD AUTO: 6.16 10*3/MM3 (ref 1.7–7)
NEUTROPHILS NFR BLD AUTO: 69.9 % (ref 42.7–76)
PLATELET # BLD AUTO: 254 10*3/MM3 (ref 140–450)
PMV BLD AUTO: 10 FL (ref 6–12)
POTASSIUM BLD-SCNC: 4.1 MMOL/L (ref 3.4–5)
PROT SERPL-MCNC: 6.9 G/DL (ref 6.3–8.6)
PSA SERPL-MCNC: 2.01 NG/ML (ref 0–4)
RBC # BLD AUTO: 4.63 10*6/MM3 (ref 4.14–5.8)
SODIUM BLD-SCNC: 145 MMOL/L (ref 137–145)
T4 FREE SERPL-MCNC: 1.02 NG/DL (ref 0.93–1.7)
TRIGL SERPL-MCNC: 112 MG/DL
TSH SERPL DL<=0.05 MIU/L-ACNC: 3.48 MIU/ML (ref 0.27–4.2)
VLDLC SERPL-MCNC: 22.4 MG/DL
WBC NRBC COR # BLD: 8.82 10*3/MM3 (ref 3.4–10.8)

## 2019-07-23 PROCEDURE — 80061 LIPID PANEL: CPT | Performed by: PHYSICIAN ASSISTANT

## 2019-07-23 PROCEDURE — 83735 ASSAY OF MAGNESIUM: CPT | Performed by: PHYSICIAN ASSISTANT

## 2019-07-23 PROCEDURE — 84443 ASSAY THYROID STIM HORMONE: CPT | Performed by: PHYSICIAN ASSISTANT

## 2019-07-23 PROCEDURE — 36415 COLL VENOUS BLD VENIPUNCTURE: CPT | Performed by: PHYSICIAN ASSISTANT

## 2019-07-23 PROCEDURE — 84439 ASSAY OF FREE THYROXINE: CPT | Performed by: PHYSICIAN ASSISTANT

## 2019-07-23 PROCEDURE — 80074 ACUTE HEPATITIS PANEL: CPT | Performed by: PHYSICIAN ASSISTANT

## 2019-07-23 PROCEDURE — G0103 PSA SCREENING: HCPCS | Performed by: PHYSICIAN ASSISTANT

## 2019-07-23 PROCEDURE — 80053 COMPREHEN METABOLIC PANEL: CPT | Performed by: PHYSICIAN ASSISTANT

## 2019-07-23 PROCEDURE — 85025 COMPLETE CBC W/AUTO DIFF WBC: CPT | Performed by: PHYSICIAN ASSISTANT

## 2019-07-23 RX ORDER — ATORVASTATIN CALCIUM 10 MG/1
10 TABLET, FILM COATED ORAL DAILY
Qty: 30 TABLET | Refills: 5 | Status: SHIPPED | OUTPATIENT
Start: 2019-07-23 | End: 2019-10-24 | Stop reason: SDUPTHER

## 2019-07-24 ENCOUNTER — TELEPHONE (OUTPATIENT)
Dept: FAMILY MEDICINE CLINIC | Facility: CLINIC | Age: 64
End: 2019-07-24

## 2019-07-30 DIAGNOSIS — J44.1 COPD WITH ACUTE EXACERBATION (HCC): ICD-10-CM

## 2019-08-05 DIAGNOSIS — F51.01 PRIMARY INSOMNIA: ICD-10-CM

## 2019-08-05 RX ORDER — AMITRIPTYLINE HYDROCHLORIDE 50 MG/1
TABLET, FILM COATED ORAL
Qty: 60 TABLET | Refills: 5 | OUTPATIENT
Start: 2019-08-05

## 2019-08-08 ENCOUNTER — OFFICE VISIT (OUTPATIENT)
Dept: PULMONOLOGY | Facility: CLINIC | Age: 64
End: 2019-08-08

## 2019-08-08 VITALS
OXYGEN SATURATION: 87 % | DIASTOLIC BLOOD PRESSURE: 64 MMHG | WEIGHT: 136.7 LBS | BODY MASS INDEX: 20.25 KG/M2 | HEART RATE: 80 BPM | SYSTOLIC BLOOD PRESSURE: 113 MMHG | HEIGHT: 69 IN

## 2019-08-08 DIAGNOSIS — J96.12 CHRONIC RESPIRATORY FAILURE WITH HYPERCAPNIA (HCC): Primary | ICD-10-CM

## 2019-08-08 DIAGNOSIS — J44.1 CHRONIC OBSTRUCTIVE PULMONARY DISEASE WITH ACUTE EXACERBATION (HCC): ICD-10-CM

## 2019-08-08 PROCEDURE — 96372 THER/PROPH/DIAG INJ SC/IM: CPT | Performed by: INTERNAL MEDICINE

## 2019-08-08 PROCEDURE — 99213 OFFICE O/P EST LOW 20 MIN: CPT | Performed by: INTERNAL MEDICINE

## 2019-08-08 RX ORDER — AZITHROMYCIN 250 MG/1
TABLET, FILM COATED ORAL
Qty: 6 TABLET | Refills: 1 | Status: SHIPPED | OUTPATIENT
Start: 2019-08-08 | End: 2019-10-22

## 2019-08-08 RX ORDER — METHYLPREDNISOLONE ACETATE 40 MG/ML
80 INJECTION, SUSPENSION INTRA-ARTICULAR; INTRALESIONAL; INTRAMUSCULAR; SOFT TISSUE ONCE
Status: COMPLETED | OUTPATIENT
Start: 2019-08-08 | End: 2019-08-08

## 2019-08-08 RX ADMIN — METHYLPREDNISOLONE ACETATE 80 MG: 40 INJECTION, SUSPENSION INTRA-ARTICULAR; INTRALESIONAL; INTRAMUSCULAR; SOFT TISSUE at 09:19

## 2019-08-08 NOTE — PROGRESS NOTES
"This 64-year-old gentleman has COPD with chronic bronchitis and an element of asthma and emphysema.  He also has chronic respiratory failure and uses oxygen 24 hours a day.  Recently he is developed increasing shortness of breath cough chest congestion and wheezing but no discolored sputum chills or fever.  He continues to take bronchodilators    ROS    Constitutional-no night sweats weight loss headaches  GI no abdominal pain nausea or diarrhea  Neuro no seizure or neurologic deficits  Musculoskeletal no deformity or joint pain   no dysuria or hematuria  Skin no rash or other lesions  All other systems reviewed and were negative except for the above.      Physical Exam  /64   Pulse 80   Ht 175.3 cm (69\")   Wt 62 kg (136 lb 11.2 oz)   SpO2 (!) 87%   BMI 20.19 kg/m²   Vital signs as above  Pupils equally round and reactive to light and accommodation, neck no JVD or adenopathy.  Cardiovascular regular rhythm and rate no murmur or gallop.  Abdomen soft no organomegaly tenderness.  Extremities no clubbing cyanosis or edema.  No cervical adenopathy.  No skin rash.  Neurologic good strength bilaterally without deficits  Lungs reveal diminished breath sounds prolonged expiration a few rhonchi but no wheeze    Impression COPD exacerbation    Plan Depo-Medrol, Zithromax if needed, continue all bronchodilators, refrain from smoking, return in 3 months         This document has been produced with the assistance of Dragon dictation  This document has been electronically signed by Aba Lee MD on August 8, 2019 9:17 AM      "

## 2019-10-22 ENCOUNTER — OFFICE VISIT (OUTPATIENT)
Dept: FAMILY MEDICINE CLINIC | Facility: CLINIC | Age: 64
End: 2019-10-22

## 2019-10-22 VITALS
WEIGHT: 138 LBS | OXYGEN SATURATION: 98 % | HEART RATE: 71 BPM | DIASTOLIC BLOOD PRESSURE: 64 MMHG | RESPIRATION RATE: 18 BRPM | TEMPERATURE: 96.7 F | BODY MASS INDEX: 20.44 KG/M2 | HEIGHT: 69 IN | SYSTOLIC BLOOD PRESSURE: 118 MMHG

## 2019-10-22 DIAGNOSIS — J44.1 COPD WITH ACUTE EXACERBATION (HCC): ICD-10-CM

## 2019-10-22 DIAGNOSIS — J96.12 CHRONIC RESPIRATORY FAILURE WITH HYPERCAPNIA (HCC): ICD-10-CM

## 2019-10-22 DIAGNOSIS — F17.200 TOBACCO DEPENDENCE SYNDROME: ICD-10-CM

## 2019-10-22 DIAGNOSIS — Z99.81 REQUIRES SUPPLEMENTAL OXYGEN: Primary | ICD-10-CM

## 2019-10-22 PROCEDURE — 96372 THER/PROPH/DIAG INJ SC/IM: CPT | Performed by: PHYSICIAN ASSISTANT

## 2019-10-22 PROCEDURE — 99214 OFFICE O/P EST MOD 30 MIN: CPT | Performed by: PHYSICIAN ASSISTANT

## 2019-10-22 RX ORDER — METHYLPREDNISOLONE ACETATE 80 MG/ML
80 INJECTION, SUSPENSION INTRA-ARTICULAR; INTRALESIONAL; INTRAMUSCULAR; SOFT TISSUE ONCE
Status: COMPLETED | OUTPATIENT
Start: 2019-10-22 | End: 2019-10-22

## 2019-10-22 RX ORDER — CEFTRIAXONE 1 G/1
1 INJECTION, POWDER, FOR SOLUTION INTRAMUSCULAR; INTRAVENOUS ONCE
Status: COMPLETED | OUTPATIENT
Start: 2019-10-22 | End: 2019-10-22

## 2019-10-22 RX ORDER — GUAIFENESIN 1200 MG/1
1200 TABLET, EXTENDED RELEASE ORAL 2 TIMES DAILY
Qty: 60 EACH | Refills: 0 | Status: SHIPPED | OUTPATIENT
Start: 2019-10-22 | End: 2021-09-09 | Stop reason: SDUPTHER

## 2019-10-22 RX ORDER — PREDNISONE 20 MG/1
TABLET ORAL
Qty: 8 TABLET | Refills: 0 | Status: SHIPPED | OUTPATIENT
Start: 2019-10-22 | End: 2019-11-04

## 2019-10-22 RX ORDER — DOXYCYCLINE HYCLATE 100 MG/1
100 CAPSULE ORAL 2 TIMES DAILY
Qty: 20 CAPSULE | Refills: 0 | Status: SHIPPED | OUTPATIENT
Start: 2019-10-22 | End: 2019-11-04

## 2019-10-22 RX ADMIN — CEFTRIAXONE 1 G: 1 INJECTION, POWDER, FOR SOLUTION INTRAMUSCULAR; INTRAVENOUS at 09:01

## 2019-10-22 RX ADMIN — METHYLPREDNISOLONE ACETATE 80 MG: 80 INJECTION, SUSPENSION INTRA-ARTICULAR; INTRALESIONAL; INTRAMUSCULAR; SOFT TISSUE at 09:01

## 2019-10-24 DIAGNOSIS — E78.00 ELEVATED LDL CHOLESTEROL LEVEL: ICD-10-CM

## 2019-10-24 DIAGNOSIS — Z91.89 CANDIDATE FOR STATIN THERAPY DUE TO RISK OF FUTURE CARDIOVASCULAR EVENT: ICD-10-CM

## 2019-10-24 RX ORDER — ATORVASTATIN CALCIUM 10 MG/1
10 TABLET, FILM COATED ORAL DAILY
Qty: 90 TABLET | Refills: 2 | Status: SHIPPED | OUTPATIENT
Start: 2019-10-24 | End: 2020-01-16 | Stop reason: SDUPTHER

## 2019-10-26 DIAGNOSIS — J44.9 COPD, SEVERE (HCC): Primary | ICD-10-CM

## 2019-10-28 ENCOUNTER — OFFICE VISIT (OUTPATIENT)
Dept: FAMILY MEDICINE CLINIC | Facility: CLINIC | Age: 64
End: 2019-10-28

## 2019-10-28 VITALS
OXYGEN SATURATION: 98 % | DIASTOLIC BLOOD PRESSURE: 68 MMHG | HEART RATE: 66 BPM | BODY MASS INDEX: 20.44 KG/M2 | TEMPERATURE: 97.2 F | HEIGHT: 69 IN | RESPIRATION RATE: 18 BRPM | WEIGHT: 138 LBS | SYSTOLIC BLOOD PRESSURE: 114 MMHG

## 2019-10-28 DIAGNOSIS — J44.1 COPD WITH ACUTE EXACERBATION (HCC): Primary | ICD-10-CM

## 2019-10-28 DIAGNOSIS — F17.200 TOBACCO DEPENDENCE SYNDROME: ICD-10-CM

## 2019-10-28 DIAGNOSIS — Z99.81 REQUIRES SUPPLEMENTAL OXYGEN: ICD-10-CM

## 2019-10-28 DIAGNOSIS — J44.9 COPD, SEVERE (HCC): ICD-10-CM

## 2019-10-28 DIAGNOSIS — J96.12 CHRONIC RESPIRATORY FAILURE WITH HYPERCAPNIA (HCC): ICD-10-CM

## 2019-10-28 PROCEDURE — 96372 THER/PROPH/DIAG INJ SC/IM: CPT | Performed by: PHYSICIAN ASSISTANT

## 2019-10-28 PROCEDURE — 99213 OFFICE O/P EST LOW 20 MIN: CPT | Performed by: PHYSICIAN ASSISTANT

## 2019-10-28 RX ORDER — IPRATROPIUM BROMIDE AND ALBUTEROL SULFATE 2.5; .5 MG/3ML; MG/3ML
SOLUTION RESPIRATORY (INHALATION)
Qty: 3 ML | Refills: 3 | Status: SHIPPED | OUTPATIENT
Start: 2019-10-28 | End: 2019-11-15 | Stop reason: SDUPTHER

## 2019-10-28 RX ORDER — CEFTRIAXONE 1 G/1
1 INJECTION, POWDER, FOR SOLUTION INTRAMUSCULAR; INTRAVENOUS ONCE
Status: COMPLETED | OUTPATIENT
Start: 2019-10-28 | End: 2019-10-28

## 2019-10-28 RX ADMIN — CEFTRIAXONE 1 G: 1 INJECTION, POWDER, FOR SOLUTION INTRAMUSCULAR; INTRAVENOUS at 09:46

## 2019-11-04 ENCOUNTER — OFFICE VISIT (OUTPATIENT)
Dept: FAMILY MEDICINE CLINIC | Facility: CLINIC | Age: 64
End: 2019-11-04

## 2019-11-04 VITALS
WEIGHT: 138 LBS | DIASTOLIC BLOOD PRESSURE: 74 MMHG | BODY MASS INDEX: 20.44 KG/M2 | OXYGEN SATURATION: 99 % | SYSTOLIC BLOOD PRESSURE: 120 MMHG | RESPIRATION RATE: 18 BRPM | HEART RATE: 74 BPM | TEMPERATURE: 98.2 F | HEIGHT: 69 IN

## 2019-11-04 DIAGNOSIS — J96.12 CHRONIC RESPIRATORY FAILURE WITH HYPERCAPNIA (HCC): ICD-10-CM

## 2019-11-04 DIAGNOSIS — J44.1 COPD WITH ACUTE EXACERBATION (HCC): Primary | ICD-10-CM

## 2019-11-04 DIAGNOSIS — Z99.81 REQUIRES SUPPLEMENTAL OXYGEN: ICD-10-CM

## 2019-11-04 DIAGNOSIS — J44.9 COPD, SEVERE (HCC): ICD-10-CM

## 2019-11-04 DIAGNOSIS — F17.200 TOBACCO DEPENDENCE SYNDROME: ICD-10-CM

## 2019-11-04 PROCEDURE — 99214 OFFICE O/P EST MOD 30 MIN: CPT | Performed by: PHYSICIAN ASSISTANT

## 2019-11-04 RX ORDER — LEVOFLOXACIN 500 MG/1
500 TABLET, FILM COATED ORAL DAILY
Qty: 7 TABLET | Refills: 0 | Status: SHIPPED | OUTPATIENT
Start: 2019-11-04 | End: 2019-11-11

## 2019-11-04 RX ORDER — MONTELUKAST SODIUM 10 MG/1
10 TABLET ORAL NIGHTLY
Qty: 30 TABLET | Refills: 5 | Status: SHIPPED | OUTPATIENT
Start: 2019-11-04 | End: 2020-04-16 | Stop reason: SDUPTHER

## 2019-11-04 RX ORDER — PREDNISONE 20 MG/1
TABLET ORAL
Qty: 8 TABLET | Refills: 0 | Status: SHIPPED | OUTPATIENT
Start: 2019-11-04 | End: 2019-11-20

## 2019-11-04 NOTE — PROGRESS NOTES
Subjective   Kermit Corley is a 64 y.o. male.     COPD   He complains of chest tightness, cough, shortness of breath, sputum production and wheezing. There is no difficulty breathing, frequent throat clearing, hemoptysis or hoarse voice. This is a new problem. The current episode started 1 to 4 weeks ago. The problem occurs constantly. The problem has been gradually improving. The cough is productive of sputum and productive. Associated symptoms include dyspnea on exertion, ear congestion, malaise/fatigue, nasal congestion, orthopnea, postnasal drip and rhinorrhea. Pertinent negatives include no appetite change, chest pain, ear pain, fever, headaches, heartburn, myalgias, PND, sneezing, sore throat, sweats, trouble swallowing or weight loss. His symptoms are aggravated by minimal activity, URI and exposure to smoke. His symptoms are alleviated by oral steroids, rest, steroid inhaler, leukotriene antagonist and beta-agonist. He reports moderate improvement on treatment. Risk factors for lung disease include smoking/tobacco exposure. His past medical history is significant for bronchitis, COPD, emphysema and pneumonia.      Pt presents today for a f/u on acute copd exacerbation. . Pt was seen on 10/22/19 and given doxycycline, prednisone taper, mucinex, and given rocephin IM 1g and depo medrol 80 mg IM injection in office. He was seen for f/u on 10/28/19 and continued on doxycycline, mucinex, prednisone taper, and given 2nd dose of rocephin IM 1g.     Pt admits to moderate improvement since 1 week f/u. Admits to chest tightness, productive cough (purulent), rhinorrhea, nasal congestion (clear), dyspnea (baseline). Reports he can walk 100ft without being short of breath. Reports he is using albuterol inhaler q 4 hours, advair as prescribed, umeclidium as prescribed, singulair as prescribed, continuous 2L O2. Admits to increased fatigue, generalized weakness. Denies fever, chills, myalgias.     Tobacco  dependence due to cigarettes - 5 cigarettes per day x 1 week. 1ppd x 45 years.    The following portions of the patient's history were reviewed and updated as appropriate: allergies, current medications, past family history, past medical history, past social history, past surgical history and problem list.    Review of Systems   Constitutional: Positive for fatigue and malaise/fatigue. Negative for activity change, appetite change, chills, diaphoresis, fever, unexpected weight gain and unexpected weight loss.   HENT: Positive for congestion, postnasal drip, rhinorrhea and sinus pressure. Negative for dental problem, drooling, ear discharge, ear pain, hoarse voice, sneezing, sore throat, swollen glands, tinnitus, trouble swallowing and voice change.    Eyes: Negative for blurred vision, double vision and visual disturbance.   Respiratory: Positive for cough, sputum production, shortness of breath and wheezing. Negative for apnea, hemoptysis, choking, chest tightness and stridor.    Cardiovascular: Positive for dyspnea on exertion. Negative for chest pain, palpitations, leg swelling and PND.   Gastrointestinal: Negative for abdominal distention, abdominal pain, constipation, diarrhea, nausea, vomiting, GERD and indigestion.   Genitourinary: Negative for dysuria.   Musculoskeletal: Negative for arthralgias, joint swelling, myalgias, neck pain and neck stiffness.   Skin: Negative.  Negative for rash.   Neurological: Positive for weakness (generalized). Negative for dizziness, light-headedness, numbness, headache and confusion.   Psychiatric/Behavioral: Negative.        Objective   Physical Exam   Constitutional: He is oriented to person, place, and time. Vital signs are normal. He appears well-developed and well-nourished. He is active and cooperative.  Non-toxic appearance. He has a sickly appearance. No distress.   Appears frail   HENT:   Head: Normocephalic and atraumatic.   Right Ear: Tympanic membrane, external ear  and ear canal normal. No drainage, swelling or tenderness. Tympanic membrane is not bulging. No middle ear effusion. Decreased hearing is noted. cerumen impaction is not present.  Left Ear: Tympanic membrane, external ear and ear canal normal. No drainage, swelling or tenderness. Tympanic membrane is not bulging.  No middle ear effusion. Decreased hearing is noted. An impacted cerumen is not present.  Nose: Mucosal edema, rhinorrhea and congestion present. Right sinus exhibits no maxillary sinus tenderness and no frontal sinus tenderness. Left sinus exhibits no maxillary sinus tenderness and no frontal sinus tenderness.   Mouth/Throat: Uvula is midline and mucous membranes are normal. Mucous membranes are not pale, not dry and not cyanotic. Posterior oropharyngeal erythema (mild) present. No oropharyngeal exudate, posterior oropharyngeal edema or tonsillar abscesses. Tonsils are 0 on the right. Tonsils are 0 on the left. No tonsillar exudate.   Eyes: Conjunctivae and EOM are normal. Pupils are equal, round, and reactive to light.   Neck: Normal range of motion. Neck supple.   Cardiovascular: Normal rate, regular rhythm, normal heart sounds and intact distal pulses. Exam reveals no gallop and no friction rub.   No murmur heard.  Pulmonary/Chest: No stridor. No tachypnea. No respiratory distress. He has decreased breath sounds (diffuse, bilateral). He has wheezes (bilateral, diffuse). He has no rhonchi. He has no rales. He exhibits no tenderness.   Supplemental oxygen by nasal cannula in place.   Abdominal: Soft. Bowel sounds are normal. He exhibits no distension. There is no tenderness. There is no guarding.   Musculoskeletal:   Ambulating with cane   Lymphadenopathy:     He has no cervical adenopathy.   Neurological: He is alert and oriented to person, place, and time.   Skin: Skin is warm and dry. Capillary refill takes less than 2 seconds. No rash noted. He is not diaphoretic. No erythema. No pallor.    Psychiatric: He has a normal mood and affect. His behavior is normal. Judgment and thought content normal.   Nursing note and vitals reviewed.    Vitals:    11/04/19 0959   BP: 120/74   Pulse: 74   Resp: 18   Temp: 98.2 °F (36.8 °C)   SpO2: 99%        Assessment/Plan   Kermit was seen today for copd.    Diagnoses and all orders for this visit:    COPD with acute exacerbation (CMS/HCC)  -     Discontinue: levoFLOXacin (LEVAQUIN) 500 MG tablet; Take 1 tablet by mouth Daily.  -     Discontinue: predniSONE (DELTASONE) 20 MG tablet; Take 1 tablet by mouth daily x 5 days then take 1/2 tablet by mouth daily x 5 days.  -     XR Chest 2 View; Future    COPD, severe (CMS/HCC)  -     montelukast (SINGULAIR) 10 MG tablet; Take 1 tablet by mouth Every Night.    Tobacco dependence syndrome    Requires supplemental oxygen    Chronic respiratory failure with hypercapnia (CMS/HCC)    Acute copd exacerbation - switch from doxycycline to levaquin as non-efficacious. Extend prednisone taper as above. Repeat cxr to rule out pneumonia. Continue albuterol, advair, umeclidium, singulair, mucinex, 2L O2 as prescribed. F/U in 1 week for recheck. Advised pt to increase fluid intake & maintain good hand hygiene. Discussed sleeping propped up for improvement in dyspnea. Discussed compliance with wearing supplemental oxygen. Advised pt to f/u in 5-6 days for recheck.      Discussed concerning s/s, worsening sx in office with pt to go to ER for evaluation and treatment.     During this visit, I spent <3 minutes counseling the patient regarding tobacco cessation. Pt declines interest in tobacco cessation. I advised Kermit of the risks of continuing to use tobacco.     Patient educated to follow up in 1 week for recheck or sooner than next scheduled appointment if symptoms worsen or do not improve. Patient stated understanding and has agreed with plan of care. After visit summary was printed and given to patient.            This document has been  electronically signed by Anjali Reyes PA-C on November 24, 2019 8:46 PM,.

## 2019-11-05 ENCOUNTER — TELEPHONE (OUTPATIENT)
Dept: FAMILY MEDICINE CLINIC | Facility: CLINIC | Age: 64
End: 2019-11-05

## 2019-11-06 ENCOUNTER — OFFICE VISIT (OUTPATIENT)
Dept: PULMONOLOGY | Facility: CLINIC | Age: 64
End: 2019-11-06

## 2019-11-06 VITALS
WEIGHT: 138.6 LBS | OXYGEN SATURATION: 84 % | BODY MASS INDEX: 20.53 KG/M2 | HEART RATE: 99 BPM | HEIGHT: 69 IN | SYSTOLIC BLOOD PRESSURE: 130 MMHG | DIASTOLIC BLOOD PRESSURE: 72 MMHG

## 2019-11-06 DIAGNOSIS — J44.9 CHRONIC BRONCHITIS WITH COPD (CHRONIC OBSTRUCTIVE PULMONARY DISEASE) (HCC): Primary | ICD-10-CM

## 2019-11-06 DIAGNOSIS — J44.1 CHRONIC OBSTRUCTIVE PULMONARY DISEASE WITH ACUTE EXACERBATION (HCC): ICD-10-CM

## 2019-11-06 PROCEDURE — 99213 OFFICE O/P EST LOW 20 MIN: CPT | Performed by: INTERNAL MEDICINE

## 2019-11-06 RX ORDER — PREDNISONE 10 MG/1
TABLET ORAL
Qty: 21 TABLET | Refills: 0 | Status: SHIPPED | OUTPATIENT
Start: 2019-11-06 | End: 2019-11-20

## 2019-11-06 NOTE — PROGRESS NOTES
"This gentleman has advanced COPD and tobacco use.  He has an other exacerbation with cough wheeze shortness of breath and purulent sputum.  He was started on an antibiotic and steroids.  His breathing remains labored and he uses oxygen    ROS    Constitutional-no night sweats weight loss headaches  GI no abdominal pain nausea or diarrhea  Neuro no seizure or neurologic deficits  Musculoskeletal no deformity or joint pain   no dysuria or hematuria  Skin no rash or other lesions  All other systems reviewed and were negative except for the above.      Physical Exam  /72   Pulse 99   Ht 175.3 cm (69\")   Wt 62.9 kg (138 lb 9.6 oz)   SpO2 (!) 84%   BMI 20.47 kg/m²   Vital signs as above  Pupils equally round and reactive to light and accommodation, neck no JVD or adenopathy.  Cardiovascular regular rhythm and rate no murmur or gallop.  Abdomen soft no organomegaly tenderness.  Extremities no clubbing cyanosis or edema.  No cervical adenopathy.  No skin rash.  Neurologic good strength bilaterally without deficits  Chronically ill-appearing dyspneic white male using oxygen.  Lungs reveal wheezes and rhonchi and diminished breath sounds and prolonged expiration    Impression emphysema with acute exacerbation, persistent tobacco use, hypoxemia    Plan continue present medications.  Refrain from smoking, will have another round of prednisone, return in 2 weeks        This document has been produced with the assistance of Dragon dictation  This document has been electronically signed by Aba Lee MD on November 6, 2019 9:46 AM      "

## 2019-11-11 ENCOUNTER — OFFICE VISIT (OUTPATIENT)
Dept: FAMILY MEDICINE CLINIC | Facility: CLINIC | Age: 64
End: 2019-11-11

## 2019-11-11 VITALS
DIASTOLIC BLOOD PRESSURE: 76 MMHG | RESPIRATION RATE: 18 BRPM | WEIGHT: 138 LBS | TEMPERATURE: 97 F | BODY MASS INDEX: 20.44 KG/M2 | OXYGEN SATURATION: 99 % | HEART RATE: 90 BPM | HEIGHT: 69 IN | SYSTOLIC BLOOD PRESSURE: 128 MMHG

## 2019-11-11 DIAGNOSIS — F17.200 TOBACCO DEPENDENCE SYNDROME: ICD-10-CM

## 2019-11-11 DIAGNOSIS — Z99.81 REQUIRES SUPPLEMENTAL OXYGEN: ICD-10-CM

## 2019-11-11 DIAGNOSIS — F51.01 PRIMARY INSOMNIA: ICD-10-CM

## 2019-11-11 DIAGNOSIS — J44.9 COPD, SEVERE (HCC): Primary | ICD-10-CM

## 2019-11-11 DIAGNOSIS — R35.0 BENIGN PROSTATIC HYPERPLASIA WITH URINARY FREQUENCY: ICD-10-CM

## 2019-11-11 DIAGNOSIS — N40.1 BENIGN PROSTATIC HYPERPLASIA WITH URINARY FREQUENCY: ICD-10-CM

## 2019-11-11 PROCEDURE — 99214 OFFICE O/P EST MOD 30 MIN: CPT | Performed by: PHYSICIAN ASSISTANT

## 2019-11-11 RX ORDER — TAMSULOSIN HYDROCHLORIDE 0.4 MG/1
1 CAPSULE ORAL NIGHTLY
Qty: 90 CAPSULE | Refills: 0 | Status: SHIPPED | OUTPATIENT
Start: 2019-11-11 | End: 2020-01-16 | Stop reason: SDUPTHER

## 2019-11-11 RX ORDER — AMITRIPTYLINE HYDROCHLORIDE 100 MG/1
100 TABLET, FILM COATED ORAL NIGHTLY
Qty: 90 TABLET | Refills: 0 | Status: SHIPPED | OUTPATIENT
Start: 2019-11-11 | End: 2020-01-16 | Stop reason: SDUPTHER

## 2019-11-15 DIAGNOSIS — J44.9 COPD, SEVERE (HCC): ICD-10-CM

## 2019-11-15 DIAGNOSIS — J44.9 CHRONIC BRONCHITIS WITH COPD (CHRONIC OBSTRUCTIVE PULMONARY DISEASE) (HCC): Primary | ICD-10-CM

## 2019-11-15 RX ORDER — IPRATROPIUM BROMIDE AND ALBUTEROL SULFATE 2.5; .5 MG/3ML; MG/3ML
3 SOLUTION RESPIRATORY (INHALATION) 4 TIMES DAILY PRN
Qty: 3 ML | Refills: 3 | Status: SHIPPED | OUTPATIENT
Start: 2019-11-15 | End: 2020-01-03

## 2019-11-20 ENCOUNTER — OFFICE VISIT (OUTPATIENT)
Dept: PULMONOLOGY | Facility: CLINIC | Age: 64
End: 2019-11-20

## 2019-11-20 VITALS
WEIGHT: 141.8 LBS | DIASTOLIC BLOOD PRESSURE: 69 MMHG | HEART RATE: 94 BPM | SYSTOLIC BLOOD PRESSURE: 131 MMHG | OXYGEN SATURATION: 80 % | BODY MASS INDEX: 21 KG/M2 | HEIGHT: 69 IN

## 2019-11-20 DIAGNOSIS — J96.12 CHRONIC RESPIRATORY FAILURE WITH HYPERCAPNIA (HCC): ICD-10-CM

## 2019-11-20 DIAGNOSIS — Z99.81 REQUIRES SUPPLEMENTAL OXYGEN: Primary | ICD-10-CM

## 2019-11-20 PROCEDURE — 99213 OFFICE O/P EST LOW 20 MIN: CPT | Performed by: INTERNAL MEDICINE

## 2019-11-20 NOTE — PROGRESS NOTES
"This gentleman has advanced COPD hypercarbic and hypoxemic respiratory failure.  He is here for O2 certification.  He remains short of breath and he still smokes    ROS    Constitutional-no night sweats weight loss headaches  GI no abdominal pain nausea or diarrhea  Neuro no seizure or neurologic deficits  Musculoskeletal no deformity or joint pain   no dysuria or hematuria  Skin no rash or other lesions  All other systems reviewed and were negative except for the above.      Physical Exam  /69   Pulse 94   Ht 175.3 cm (69\")   Wt 64.3 kg (141 lb 12.8 oz)   SpO2 (!) 80%   BMI 20.94 kg/m²   Vital signs as above  Pupils equally round and reactive to light and accommodation, neck no JVD or adenopathy.  Cardiovascular regular rhythm and rate no murmur or gallop.  Abdomen soft no organomegaly tenderness.  Extremities no clubbing cyanosis or edema.  No cervical adenopathy.  No skin rash.  Neurologic good strength bilaterally without deficits  He will Dyspneic bearded white male using oxygen.  Lungs reveal greatly diminished breath sounds without wheeze    O2 saturation 80% room air and exercise, O2 saturation 96% exercise on 2 L nasal    Impression advanced COPD with persistent tobacco use and hypoxemia    Recommendations cessation of smoking, renew O2 2 L nasal, continue breathing medications, return in 2 months            This document has been produced with the assistance of Dragon dictation  This document has been electronically signed by Aba Lee MD on November 20, 2019 10:11 AM      "

## 2020-01-02 DIAGNOSIS — J44.9 COPD, SEVERE (HCC): ICD-10-CM

## 2020-01-02 DIAGNOSIS — J44.9 CHRONIC BRONCHITIS WITH COPD (CHRONIC OBSTRUCTIVE PULMONARY DISEASE) (HCC): ICD-10-CM

## 2020-01-03 RX ORDER — IPRATROPIUM BROMIDE AND ALBUTEROL SULFATE 2.5; .5 MG/3ML; MG/3ML
SOLUTION RESPIRATORY (INHALATION)
Qty: 360 ML | Refills: 0 | Status: SHIPPED | OUTPATIENT
Start: 2020-01-03 | End: 2020-02-24

## 2020-01-16 ENCOUNTER — OFFICE VISIT (OUTPATIENT)
Dept: FAMILY MEDICINE CLINIC | Facility: CLINIC | Age: 65
End: 2020-01-16

## 2020-01-16 VITALS
HEIGHT: 69 IN | BODY MASS INDEX: 21.18 KG/M2 | WEIGHT: 143 LBS | RESPIRATION RATE: 18 BRPM | SYSTOLIC BLOOD PRESSURE: 118 MMHG | HEART RATE: 73 BPM | OXYGEN SATURATION: 99 % | DIASTOLIC BLOOD PRESSURE: 76 MMHG

## 2020-01-16 DIAGNOSIS — J44.1 COPD WITH ACUTE EXACERBATION (HCC): Primary | ICD-10-CM

## 2020-01-16 DIAGNOSIS — Z91.89 CANDIDATE FOR STATIN THERAPY DUE TO RISK OF FUTURE CARDIOVASCULAR EVENT: ICD-10-CM

## 2020-01-16 DIAGNOSIS — Z13.29 SCREENING FOR THYROID DISORDER: ICD-10-CM

## 2020-01-16 DIAGNOSIS — Z99.81 REQUIRES SUPPLEMENTAL OXYGEN: ICD-10-CM

## 2020-01-16 DIAGNOSIS — I10 ESSENTIAL HYPERTENSION: ICD-10-CM

## 2020-01-16 DIAGNOSIS — F51.01 PRIMARY INSOMNIA: ICD-10-CM

## 2020-01-16 DIAGNOSIS — F17.200 TOBACCO DEPENDENCE SYNDROME: ICD-10-CM

## 2020-01-16 DIAGNOSIS — R35.0 BENIGN PROSTATIC HYPERPLASIA WITH URINARY FREQUENCY: ICD-10-CM

## 2020-01-16 DIAGNOSIS — N40.1 BENIGN PROSTATIC HYPERPLASIA WITH URINARY FREQUENCY: ICD-10-CM

## 2020-01-16 DIAGNOSIS — E83.42 HYPOMAGNESEMIA: ICD-10-CM

## 2020-01-16 DIAGNOSIS — J44.9 COPD, SEVERE (HCC): ICD-10-CM

## 2020-01-16 DIAGNOSIS — E78.00 ELEVATED LDL CHOLESTEROL LEVEL: ICD-10-CM

## 2020-01-16 PROCEDURE — 99213 OFFICE O/P EST LOW 20 MIN: CPT | Performed by: PHYSICIAN ASSISTANT

## 2020-01-16 PROCEDURE — 94640 AIRWAY INHALATION TREATMENT: CPT | Performed by: PHYSICIAN ASSISTANT

## 2020-01-16 PROCEDURE — 96372 THER/PROPH/DIAG INJ SC/IM: CPT | Performed by: PHYSICIAN ASSISTANT

## 2020-01-16 RX ORDER — IPRATROPIUM BROMIDE AND ALBUTEROL SULFATE 2.5; .5 MG/3ML; MG/3ML
3 SOLUTION RESPIRATORY (INHALATION) ONCE
Status: COMPLETED | OUTPATIENT
Start: 2020-01-16 | End: 2020-01-16

## 2020-01-16 RX ORDER — CEFTRIAXONE 1 G/1
1 INJECTION, POWDER, FOR SOLUTION INTRAMUSCULAR; INTRAVENOUS ONCE
Status: COMPLETED | OUTPATIENT
Start: 2020-01-16 | End: 2020-01-16

## 2020-01-16 RX ORDER — AMITRIPTYLINE HYDROCHLORIDE 100 MG/1
100 TABLET, FILM COATED ORAL NIGHTLY
Qty: 90 TABLET | Refills: 1 | Status: SHIPPED | OUTPATIENT
Start: 2020-01-16 | End: 2020-04-16 | Stop reason: SDUPTHER

## 2020-01-16 RX ORDER — METHYLPREDNISOLONE 4 MG/1
TABLET ORAL
Qty: 1 EACH | Refills: 0 | Status: SHIPPED | OUTPATIENT
Start: 2020-01-16 | End: 2020-01-20

## 2020-01-16 RX ORDER — ATORVASTATIN CALCIUM 10 MG/1
10 TABLET, FILM COATED ORAL DAILY
Qty: 90 TABLET | Refills: 2 | Status: SHIPPED | OUTPATIENT
Start: 2020-01-16 | End: 2020-10-09 | Stop reason: SDUPTHER

## 2020-01-16 RX ORDER — DOXYCYCLINE HYCLATE 100 MG/1
100 CAPSULE ORAL 2 TIMES DAILY
Qty: 20 CAPSULE | Refills: 0 | Status: SHIPPED | OUTPATIENT
Start: 2020-01-16 | End: 2020-02-25

## 2020-01-16 RX ORDER — TAMSULOSIN HYDROCHLORIDE 0.4 MG/1
1 CAPSULE ORAL NIGHTLY
Qty: 90 CAPSULE | Refills: 1 | Status: SHIPPED | OUTPATIENT
Start: 2020-01-16 | End: 2020-04-16 | Stop reason: SDUPTHER

## 2020-01-16 RX ADMIN — CEFTRIAXONE 1 G: 1 INJECTION, POWDER, FOR SOLUTION INTRAMUSCULAR; INTRAVENOUS at 11:10

## 2020-01-16 RX ADMIN — IPRATROPIUM BROMIDE AND ALBUTEROL SULFATE 3 ML: 2.5; .5 SOLUTION RESPIRATORY (INHALATION) at 11:11

## 2020-01-20 ENCOUNTER — OFFICE VISIT (OUTPATIENT)
Dept: PULMONOLOGY | Facility: CLINIC | Age: 65
End: 2020-01-20

## 2020-01-20 VITALS
HEIGHT: 69 IN | HEART RATE: 95 BPM | WEIGHT: 143 LBS | DIASTOLIC BLOOD PRESSURE: 82 MMHG | OXYGEN SATURATION: 99 % | BODY MASS INDEX: 21.18 KG/M2 | SYSTOLIC BLOOD PRESSURE: 136 MMHG

## 2020-01-20 DIAGNOSIS — J96.11 HYPOXEMIC RESPIRATORY FAILURE, CHRONIC (HCC): ICD-10-CM

## 2020-01-20 DIAGNOSIS — J44.1 CHRONIC OBSTRUCTIVE PULMONARY DISEASE WITH ACUTE EXACERBATION (HCC): Primary | ICD-10-CM

## 2020-01-20 PROCEDURE — 99214 OFFICE O/P EST MOD 30 MIN: CPT | Performed by: INTERNAL MEDICINE

## 2020-01-20 PROCEDURE — 96372 THER/PROPH/DIAG INJ SC/IM: CPT | Performed by: INTERNAL MEDICINE

## 2020-01-20 RX ORDER — METHYLPREDNISOLONE ACETATE 80 MG/ML
80 INJECTION, SUSPENSION INTRA-ARTICULAR; INTRALESIONAL; INTRAMUSCULAR; SOFT TISSUE ONCE
Status: COMPLETED | OUTPATIENT
Start: 2020-01-20 | End: 2020-01-20

## 2020-01-20 RX ORDER — DOXYCYCLINE 100 MG/1
CAPSULE ORAL
Qty: 20 CAPSULE | Refills: 0 | Status: SHIPPED | OUTPATIENT
Start: 2020-01-20 | End: 2020-02-25

## 2020-01-20 RX ORDER — PREDNISONE 10 MG/1
TABLET ORAL
Qty: 21 TABLET | Refills: 0 | Status: SHIPPED | OUTPATIENT
Start: 2020-01-20 | End: 2020-02-25

## 2020-01-20 RX ADMIN — METHYLPREDNISOLONE ACETATE 80 MG: 80 INJECTION, SUSPENSION INTRA-ARTICULAR; INTRALESIONAL; INTRAMUSCULAR; SOFT TISSUE at 11:09

## 2020-01-20 NOTE — PROGRESS NOTES
"This gentleman has advanced COPD with chronic hypoxic respiratory failure.  He is also a cigarette smoker.  He uses oxygen.  His main complaint is a 1 week history of severe dyspnea cough wheeze purulent sputum.  He denies chills or fever chest pain or hemoptysis    The following portions of the patient's history were reviewed and updated as appropriate: current medications, past family history, past medical history, past social history, past surgical history and problem list.      ROS    Constitutional-no night sweats weight loss headaches  GI no abdominal pain nausea or diarrhea  Neuro no seizure or neurologic deficits  Musculoskeletal no deformity or joint pain   no dysuria or hematuria  Skin no rash or other lesions  All other systems reviewed and were negative except for the above.      Physical Exam  /82   Pulse 95   Ht 175.3 cm (69\")   Wt 64.9 kg (143 lb)   SpO2 99%   BMI 21.12 kg/m²   Vital signs as above  Pupils equally round and reactive to light and accommodation, neck no JVD or adenopathy.  Cardiovascular regular rhythm and rate no murmur or gallop.  Abdomen soft no organomegaly tenderness.  Extremities no clubbing cyanosis or edema.  No cervical adenopathy.  No skin rash.  Neurologic good strength bilaterally without deficits  Chronically ill-appearing dyspneic white male using oxygen, lungs reveal wheezes and rhonchi bilaterally, patient is in respiratory distress    Impression COPD exacerbation, chronic hypoxemia    Plan Depo-Medrol, prednisone, continue breathing medications, return next week        This document has been produced with the assistance of Dragon dictation  This document has been electronically signed by Aba Lee MD on January 20, 2020 11:05 AM      "

## 2020-01-27 ENCOUNTER — OFFICE VISIT (OUTPATIENT)
Dept: PULMONOLOGY | Facility: CLINIC | Age: 65
End: 2020-01-27

## 2020-01-27 VITALS
DIASTOLIC BLOOD PRESSURE: 70 MMHG | HEART RATE: 75 BPM | SYSTOLIC BLOOD PRESSURE: 130 MMHG | WEIGHT: 142 LBS | HEIGHT: 69 IN | BODY MASS INDEX: 21.03 KG/M2 | OXYGEN SATURATION: 98 %

## 2020-01-27 DIAGNOSIS — J44.1 CHRONIC OBSTRUCTIVE PULMONARY DISEASE WITH ACUTE EXACERBATION (HCC): Primary | ICD-10-CM

## 2020-01-27 PROCEDURE — 96372 THER/PROPH/DIAG INJ SC/IM: CPT | Performed by: INTERNAL MEDICINE

## 2020-01-27 PROCEDURE — 99213 OFFICE O/P EST LOW 20 MIN: CPT | Performed by: INTERNAL MEDICINE

## 2020-01-27 RX ORDER — BETAMETHASONE SODIUM PHOSPHATE AND BETAMETHASONE ACETATE 3; 3 MG/ML; MG/ML
6 INJECTION, SUSPENSION INTRA-ARTICULAR; INTRALESIONAL; INTRAMUSCULAR; SOFT TISSUE ONCE
Status: COMPLETED | OUTPATIENT
Start: 2020-01-27 | End: 2020-01-27

## 2020-01-27 RX ORDER — PREDNISONE 20 MG/1
20 TABLET ORAL DAILY
Qty: 30 TABLET | Refills: 0 | Status: SHIPPED | OUTPATIENT
Start: 2020-01-27 | End: 2020-04-16

## 2020-01-27 RX ADMIN — BETAMETHASONE SODIUM PHOSPHATE AND BETAMETHASONE ACETATE 6 MG: 3; 3 INJECTION, SUSPENSION INTRA-ARTICULAR; INTRALESIONAL; INTRAMUSCULAR; SOFT TISSUE at 10:07

## 2020-01-27 NOTE — PROGRESS NOTES
"This gentleman has chronic hypoxemic respiratory failure and persistent tobacco use.  He continues to be short of breath but denies chest pain or hemoptysis    ROS    Constitutional-no night sweats weight loss headaches  GI no abdominal pain nausea or diarrhea  Neuro no seizure or neurologic deficits  Musculoskeletal no deformity or joint pain   no dysuria or hematuria  Skin no rash or other lesions  All other systems reviewed and were negative except for the above.      Physical Exam  /70   Pulse 75   Ht 175.3 cm (69\")   Wt 64.4 kg (142 lb)   SpO2 98%   BMI 20.97 kg/m²   Vital signs as above  Pupils equally round and reactive to light and accommodation, neck no JVD or adenopathy.  Cardiovascular regular rhythm and rate no murmur or gallop.  Abdomen soft no organomegaly tenderness.  Extremities no clubbing cyanosis or edema.  No cervical adenopathy.  No skin rash.  Neurologic good strength bilaterally without deficits  Alert afebrile dyspneic white male lungs reveal prolonged expiration, patient is using oxygen    Impression COPD with prolonged exacerbation    Plan [facility]estone IM, prednisone 20 until improved, continue present medications, discouraged cigarette smoking, return in 1 months        This document has been produced with the assistance of Dragon dictation  This document has been electronically signed by Aba Lee MD on January 27, 2020 10:02 AM      "

## 2020-02-18 ENCOUNTER — OFFICE VISIT (OUTPATIENT)
Dept: PULMONOLOGY | Facility: CLINIC | Age: 65
End: 2020-02-18

## 2020-02-18 VITALS
SYSTOLIC BLOOD PRESSURE: 138 MMHG | OXYGEN SATURATION: 90 % | WEIGHT: 147.8 LBS | HEIGHT: 69 IN | HEART RATE: 86 BPM | BODY MASS INDEX: 21.89 KG/M2 | DIASTOLIC BLOOD PRESSURE: 72 MMHG

## 2020-02-18 DIAGNOSIS — J96.12 CHRONIC RESPIRATORY FAILURE WITH HYPERCAPNIA (HCC): Primary | ICD-10-CM

## 2020-02-18 PROCEDURE — 99213 OFFICE O/P EST LOW 20 MIN: CPT | Performed by: INTERNAL MEDICINE

## 2020-02-18 PROCEDURE — 96372 THER/PROPH/DIAG INJ SC/IM: CPT | Performed by: INTERNAL MEDICINE

## 2020-02-18 RX ORDER — METHYLPREDNISOLONE ACETATE 40 MG/ML
80 INJECTION, SUSPENSION INTRA-ARTICULAR; INTRALESIONAL; INTRAMUSCULAR; SOFT TISSUE ONCE
Status: COMPLETED | OUTPATIENT
Start: 2020-02-18 | End: 2020-02-18

## 2020-02-18 RX ADMIN — METHYLPREDNISOLONE ACETATE 80 MG: 40 INJECTION, SUSPENSION INTRA-ARTICULAR; INTRALESIONAL; INTRAMUSCULAR; SOFT TISSUE at 11:22

## 2020-02-18 NOTE — PROGRESS NOTES
"This gentleman has COPD with chronic respiratory failure and persistent tobacco use.  He complains of cough wheeze and shortness of breath    ROS    Constitutional-no night sweats weight loss headaches  GI no abdominal pain nausea or diarrhea  Neuro no seizure or neurologic deficits  Musculoskeletal no deformity or joint pain   no dysuria or hematuria  Skin no rash or other lesions  All other systems reviewed and were negative except for the above.      Physical Exam  /72 (BP Location: Left arm, Patient Position: Sitting, Cuff Size: Adult)   Pulse 86   Ht 175.3 cm (69\")   Wt 67 kg (147 lb 12.8 oz)   SpO2 90%   BMI 21.83 kg/m²   Vital signs as above  Pupils equally round and reactive to light and accommodation, neck no JVD or adenopathy.  Cardiovascular regular rhythm and rate no murmur or gallop.  Abdomen soft no organomegaly tenderness.  Extremities no clubbing cyanosis or edema.  No cervical adenopathy.  No skin rash.  Neurologic good strength bilaterally without deficits  Dyspneic white male on oxygen lungs reveal diminished breath sounds with prolonged expiration    Impression COPD recent exacerbation, tobacco use    Recommendations Depo-Medrol, refrain from smoking, continue present medications, return in 2 months        This document has been produced with the assistance of Dragon dictation  This document has been electronically signed by Aba Lee MD on February 18, 2020 11:20 AM      "

## 2020-02-21 DIAGNOSIS — J44.9 CHRONIC BRONCHITIS WITH COPD (CHRONIC OBSTRUCTIVE PULMONARY DISEASE) (HCC): ICD-10-CM

## 2020-02-21 DIAGNOSIS — J44.9 COPD, SEVERE (HCC): ICD-10-CM

## 2020-02-24 RX ORDER — IPRATROPIUM BROMIDE AND ALBUTEROL SULFATE 2.5; .5 MG/3ML; MG/3ML
SOLUTION RESPIRATORY (INHALATION)
Qty: 360 ML | Refills: 0 | Status: SHIPPED | OUTPATIENT
Start: 2020-02-24 | End: 2020-03-27

## 2020-02-25 ENCOUNTER — LAB (OUTPATIENT)
Dept: LAB | Facility: OTHER | Age: 65
End: 2020-02-25

## 2020-02-25 ENCOUNTER — OFFICE VISIT (OUTPATIENT)
Dept: FAMILY MEDICINE CLINIC | Facility: CLINIC | Age: 65
End: 2020-02-25

## 2020-02-25 VITALS
WEIGHT: 149.2 LBS | DIASTOLIC BLOOD PRESSURE: 82 MMHG | HEIGHT: 69 IN | SYSTOLIC BLOOD PRESSURE: 130 MMHG | HEART RATE: 83 BPM | BODY MASS INDEX: 22.1 KG/M2 | OXYGEN SATURATION: 97 % | RESPIRATION RATE: 18 BRPM | TEMPERATURE: 98.3 F

## 2020-02-25 DIAGNOSIS — I10 ESSENTIAL HYPERTENSION: ICD-10-CM

## 2020-02-25 DIAGNOSIS — J96.12 CHRONIC RESPIRATORY FAILURE WITH HYPERCAPNIA (HCC): ICD-10-CM

## 2020-02-25 DIAGNOSIS — J44.9 COPD, SEVERE (HCC): ICD-10-CM

## 2020-02-25 DIAGNOSIS — Z09 HOSPITAL DISCHARGE FOLLOW-UP: Primary | ICD-10-CM

## 2020-02-25 DIAGNOSIS — Z99.81 REQUIRES SUPPLEMENTAL OXYGEN: ICD-10-CM

## 2020-02-25 DIAGNOSIS — J15.1: ICD-10-CM

## 2020-02-25 DIAGNOSIS — E83.42 HYPOMAGNESEMIA: ICD-10-CM

## 2020-02-25 DIAGNOSIS — F17.200 TOBACCO DEPENDENCE SYNDROME: ICD-10-CM

## 2020-02-25 DIAGNOSIS — Z13.29 SCREENING FOR THYROID DISORDER: ICD-10-CM

## 2020-02-25 DIAGNOSIS — J44.1 ACUTE EXACERBATION OF CHRONIC OBSTRUCTIVE PULMONARY DISEASE (COPD) (HCC): ICD-10-CM

## 2020-02-25 LAB
ALBUMIN SERPL-MCNC: 4 G/DL (ref 3.5–5)
ALBUMIN/GLOB SERPL: 1.5 G/DL (ref 1.1–1.8)
ALP SERPL-CCNC: 83 U/L (ref 38–126)
ALT SERPL W P-5'-P-CCNC: 52 U/L
ANION GAP SERPL CALCULATED.3IONS-SCNC: 4 MMOL/L (ref 5–15)
AST SERPL-CCNC: 31 U/L (ref 17–59)
BILIRUB SERPL-MCNC: 0.4 MG/DL (ref 0.2–1.3)
BUN BLD-MCNC: 10 MG/DL (ref 7–23)
BUN/CREAT SERPL: 15.2 (ref 7–25)
CALCIUM SPEC-SCNC: 9.8 MG/DL (ref 8.4–10.2)
CHLORIDE SERPL-SCNC: 100 MMOL/L (ref 101–112)
CHOLEST SERPL-MCNC: 178 MG/DL (ref 150–200)
CO2 SERPL-SCNC: 35 MMOL/L (ref 22–30)
CREAT BLD-MCNC: 0.66 MG/DL (ref 0.7–1.3)
DEPRECATED RDW RBC AUTO: 51.1 FL (ref 37–54)
EOSINOPHIL # BLD MANUAL: 0.14 10*3/MM3 (ref 0–0.4)
EOSINOPHIL NFR BLD MANUAL: 1 % (ref 0.3–6.2)
ERYTHROCYTE [DISTWIDTH] IN BLOOD BY AUTOMATED COUNT: 14.8 % (ref 12.3–15.4)
GFR SERPL CREATININE-BSD FRML MDRD: 121 ML/MIN/1.73 (ref 49–113)
GLOBULIN UR ELPH-MCNC: 2.7 GM/DL (ref 2.3–3.5)
GLUCOSE BLD-MCNC: 111 MG/DL (ref 70–99)
HCT VFR BLD AUTO: 44.4 % (ref 37.5–51)
HDLC SERPL-MCNC: 88 MG/DL (ref 40–59)
HGB BLD-MCNC: 13.8 G/DL (ref 13–17.7)
LDLC SERPL CALC-MCNC: 73 MG/DL
LDLC/HDLC SERPL: 0.83 {RATIO} (ref 0–3.55)
LYMPHOCYTES # BLD MANUAL: 0.56 10*3/MM3 (ref 0.7–3.1)
LYMPHOCYTES NFR BLD MANUAL: 4 % (ref 19.6–45.3)
LYMPHOCYTES NFR BLD MANUAL: 4 % (ref 5–12)
MAGNESIUM SERPL-MCNC: 2.4 MG/DL (ref 1.6–2.3)
MCH RBC QN AUTO: 30.4 PG (ref 26.6–33)
MCHC RBC AUTO-ENTMCNC: 31.1 G/DL (ref 31.5–35.7)
MCV RBC AUTO: 97.8 FL (ref 79–97)
MONOCYTES # BLD AUTO: 0.56 10*3/MM3 (ref 0.1–0.9)
NEUTROPHILS # BLD AUTO: 12.82 10*3/MM3 (ref 1.7–7)
NEUTROPHILS NFR BLD MANUAL: 90 % (ref 42.7–76)
NEUTS BAND NFR BLD MANUAL: 1 % (ref 0–5)
PLATELET # BLD AUTO: 312 10*3/MM3 (ref 140–450)
PMV BLD AUTO: 9.9 FL (ref 6–12)
POTASSIUM BLD-SCNC: 4.7 MMOL/L (ref 3.4–5)
PROT SERPL-MCNC: 6.7 G/DL (ref 6.3–8.6)
RBC # BLD AUTO: 4.54 10*6/MM3 (ref 4.14–5.8)
RBC MORPH BLD: NORMAL
SMALL PLATELETS BLD QL SMEAR: ADEQUATE
SODIUM BLD-SCNC: 139 MMOL/L (ref 137–145)
TRIGL SERPL-MCNC: 84 MG/DL
VLDLC SERPL-MCNC: 16.8 MG/DL
WBC MORPH BLD: NORMAL
WBC NRBC COR # BLD: 14.09 10*3/MM3 (ref 3.4–10.8)

## 2020-02-25 PROCEDURE — 99214 OFFICE O/P EST MOD 30 MIN: CPT | Performed by: PHYSICIAN ASSISTANT

## 2020-02-25 PROCEDURE — 80053 COMPREHEN METABOLIC PANEL: CPT | Performed by: PHYSICIAN ASSISTANT

## 2020-02-25 PROCEDURE — 84443 ASSAY THYROID STIM HORMONE: CPT | Performed by: PHYSICIAN ASSISTANT

## 2020-02-25 PROCEDURE — 85025 COMPLETE CBC W/AUTO DIFF WBC: CPT | Performed by: PHYSICIAN ASSISTANT

## 2020-02-25 PROCEDURE — 36415 COLL VENOUS BLD VENIPUNCTURE: CPT | Performed by: PHYSICIAN ASSISTANT

## 2020-02-25 PROCEDURE — 80061 LIPID PANEL: CPT | Performed by: PHYSICIAN ASSISTANT

## 2020-02-25 PROCEDURE — 96372 THER/PROPH/DIAG INJ SC/IM: CPT | Performed by: PHYSICIAN ASSISTANT

## 2020-02-25 PROCEDURE — 83735 ASSAY OF MAGNESIUM: CPT | Performed by: PHYSICIAN ASSISTANT

## 2020-02-25 RX ORDER — LEVOFLOXACIN 500 MG/1
500 TABLET, FILM COATED ORAL DAILY
Qty: 7 TABLET | Refills: 0 | Status: SHIPPED | OUTPATIENT
Start: 2020-02-25 | End: 2020-03-10

## 2020-02-25 RX ORDER — CEFTRIAXONE 1 G/1
1 INJECTION, POWDER, FOR SOLUTION INTRAMUSCULAR; INTRAVENOUS ONCE
Status: COMPLETED | OUTPATIENT
Start: 2020-02-25 | End: 2020-02-25

## 2020-02-25 RX ORDER — METHYLPREDNISOLONE SODIUM SUCCINATE 40 MG/ML
40 INJECTION, POWDER, LYOPHILIZED, FOR SOLUTION INTRAMUSCULAR; INTRAVENOUS ONCE
Status: DISCONTINUED | OUTPATIENT
Start: 2020-02-25 | End: 2020-02-25

## 2020-02-25 RX ORDER — METHYLPREDNISOLONE SODIUM SUCCINATE 40 MG/ML
40 INJECTION, POWDER, LYOPHILIZED, FOR SOLUTION INTRAMUSCULAR; INTRAVENOUS ONCE
Status: COMPLETED | OUTPATIENT
Start: 2020-02-25 | End: 2020-02-25

## 2020-02-25 RX ADMIN — METHYLPREDNISOLONE SODIUM SUCCINATE 40 MG: 40 INJECTION, POWDER, LYOPHILIZED, FOR SOLUTION INTRAMUSCULAR; INTRAVENOUS at 10:11

## 2020-02-25 RX ADMIN — CEFTRIAXONE 1 G: 1 INJECTION, POWDER, FOR SOLUTION INTRAMUSCULAR; INTRAVENOUS at 10:09

## 2020-02-25 NOTE — PROGRESS NOTES
Subjective   Kermit Corley is a 64 y.o. male.     COPD   He complains of chest tightness, cough, frequent throat clearing, shortness of breath, sputum production and wheezing. There is no hemoptysis or hoarse voice. This is a chronic problem. The current episode started 1 to 4 weeks ago. The problem occurs constantly. The problem has been gradually worsening. The cough is productive of sputum, productive and productive of brown sputum. Associated symptoms include dyspnea on exertion, ear congestion, headaches, malaise/fatigue, nasal congestion, postnasal drip, rhinorrhea, sneezing and a sore throat. Pertinent negatives include no appetite change, chest pain, ear pain, fever, heartburn, myalgias, orthopnea, PND, sweats, trouble swallowing or weight loss. His symptoms are aggravated by any activity, climbing stairs, exercise, minimal activity, strenuous activity, exposure to smoke and URI.      Pt presents today for a hospital f/u. Pt reports he was seen at [facility] ER on 2/9/20 and admitted with pneumonia of left lower lobe, copd exacerbation.  CT chest performed on 2/10/20 revealing focal DOC opacity favoring infiltrate vs post inflammatory scarring.  Sputum culture performed at hospital revealing pseudomonas and yeast - tx with levaquin and diflucan respectively. CXR performed 2/12/20 revealing complete clearing of previously noted infiltrate. Pt was discharged home on 2/15/20 with prednisone taper, levaquin x 2 days, diflucan x 4 days.     2/18/20: f/u with pulmonology, Dr. Lee, pt reports he was not feeling any better and received depo-medrol IM injection and advised to continue prescribed medications.    2/23/20: Pt presented to ER with c/c of dyspnea. Dx with copd exacerbation. Repeat cxr performed on 2/23/20 - noted for no acute pleural parenchymal process in lung fields. Emphysematous changes bilaterally. Negative d-dimer. BNP wnl. CK wnl. Troponin negative. Magnesium wnl. Advised to continue  medications as prescribed at previous discharge.    2/25/20: Pt presents today with a c/c of dyspnea which has been worse than baseline x 3 weeks. Admits to productive cough of purulent/brown tinged sputum. Admits to rhinorrhea, sore throat, pleuritic chest tightness, frontal headache. Denies fever, chills, myalgias. Reports he has been wearing continuous o2 2L. Pt reports he has 5 remaining days of steroid taper. Pt reports he has completed antifungal and antibiotics as prescribed. Pt reports he continues to smoke 1/2 ppd. Pt reports he has been taking albuterol inhaler, advair inhaler, umeclidium inhaler, singulair, and duo-neb q 4 hours prn as prescribed.  O2 stable in office at 97%.     The following portions of the patient's history were reviewed and updated as appropriate: allergies, current medications, past family history, past medical history, past social history, past surgical history and problem list.    Review of Systems   Constitutional: Positive for activity change, fatigue and malaise/fatigue. Negative for appetite change, chills, diaphoresis, fever, unexpected weight gain and unexpected weight loss.   HENT: Positive for congestion, postnasal drip, rhinorrhea, sneezing and sore throat. Negative for dental problem, drooling, ear discharge, ear pain, facial swelling, hearing loss, hoarse voice, mouth sores, nosebleeds, sinus pressure, tinnitus, trouble swallowing and voice change.    Eyes: Negative for blurred vision, double vision, pain and visual disturbance.   Respiratory: Positive for cough, sputum production, chest tightness, shortness of breath and wheezing. Negative for apnea, hemoptysis, choking and stridor.    Cardiovascular: Positive for dyspnea on exertion. Negative for chest pain, palpitations, leg swelling and PND.   Gastrointestinal: Negative for abdominal distention, abdominal pain, constipation, diarrhea, nausea, vomiting, GERD and indigestion.   Endocrine: Negative.    Genitourinary:  Negative for dysuria.   Musculoskeletal: Negative for arthralgias, myalgias and neck pain.   Skin: Negative for rash.   Neurological: Positive for weakness and headache. Negative for dizziness, light-headedness and confusion.   Psychiatric/Behavioral: Negative.        Objective   Physical Exam   Constitutional: He is oriented to person, place, and time. He appears well-developed and well-nourished. He is active and cooperative. He has a sickly appearance. No distress.   Appears frail   HENT:   Head: Normocephalic and atraumatic.   Right Ear: External ear and ear canal normal. Decreased hearing is noted. cerumen impaction is present.  Left Ear: External ear and ear canal normal. Decreased hearing is noted. An impacted cerumen is present.  Nose: Mucosal edema, rhinorrhea and congestion present. Right sinus exhibits no maxillary sinus tenderness and no frontal sinus tenderness. Left sinus exhibits no maxillary sinus tenderness and no frontal sinus tenderness.   Mouth/Throat: Uvula is midline and mucous membranes are normal. Mucous membranes are not pale, not dry and not cyanotic. Posterior oropharyngeal erythema present. No oropharyngeal exudate, posterior oropharyngeal edema or tonsillar abscesses. Tonsils are 0 on the right. Tonsils are 0 on the left. No tonsillar exudate.   Eyes: Pupils are equal, round, and reactive to light. Conjunctivae and EOM are normal.   Neck: Normal range of motion. Neck supple.   Cardiovascular: Normal rate, regular rhythm, normal heart sounds and intact distal pulses. Exam reveals no gallop and no friction rub.   No murmur heard.  Pulmonary/Chest: Effort normal. No accessory muscle usage or stridor. No apnea, no tachypnea and no bradypnea. No respiratory distress. He has decreased breath sounds (diffuse, bilateral). He has wheezes (bilateral, diffuse). He has no rhonchi. He has no rales. He exhibits no tenderness.   Supplemental oxygen by nasal cannula in place.   Abdominal: Soft. Bowel  sounds are normal. He exhibits no distension. There is no tenderness. There is no guarding.   Lymphadenopathy:     He has no cervical adenopathy.   Neurological: He is alert and oriented to person, place, and time.   Skin: Skin is warm and dry. Capillary refill takes less than 2 seconds. No rash noted. He is not diaphoretic. No erythema. No pallor.   Psychiatric: He has a normal mood and affect. His behavior is normal. Judgment and thought content normal.   Nursing note and vitals reviewed.    Vitals:    02/25/20 0857   BP: 130/82   Pulse: 83   Resp: 18   Temp: 98.3 °F (36.8 °C)   SpO2: 97%        Assessment/Plan   Kermit was seen today for follow-up.    Diagnoses and all orders for this visit:    Hospital discharge follow-up    Acute exacerbation of chronic obstructive pulmonary disease (COPD) (CMS/[facility])  -     cefTRIAXone (ROCEPHIN) injection 1 g  -     Discontinue: methylPREDNISolone sodium succinate (SOLU-Medrol) injection 40 mg  -     levoFLOXacin (LEVAQUIN) 500 MG tablet; Take 1 tablet by mouth Daily.  -     CBC & Differential; Future  -     Comprehensive metabolic panel; Future  -     Respiratory Culture - Sputum, Cough; Future  -     methylPREDNISolone sodium succinate (SOLU-Medrol) injection 40 mg    Tobacco dependence syndrome    Requires supplemental oxygen    Chronic respiratory failure with hypercapnia (CMS/[facility])    COPD, severe (CMS/[facility])      Hospital discharge f/u, acute copd, pneumonia left lobe - CXR performed 2/12/20 revealing complete clearing of previously noted infiltrate. Repeat cxr performed on 2/23/20 - noted for no acute pleural parenchymal process in lung fields. Emphysematous changes bilaterally. No perceived improvement per pt since discharge from hospital. Plan to repeat cbc, cmp, and repeat sputum culture today to determine if clearing of pseudomonal and fungal infection. Advised to continue prednisone taper as prescribed until completion. Rocephin IM and solu-medrol IM injection as above  given to pt in office. Advised to continue bronchodilators as prescribed. Continue 2L O2 continuous oxygen supplementation. Advised to increase fluid intake & maintain good hand hygiene. Continue mucinex otc as directed. Advised to alternate tylenol/ibuprofen otc q 4-6 hours prn for mild pain/fever. Discussed black box warnings of fluoroquinolones including increased risk for tendinitis/tendon rupture, peripheral neuropathy, & potential side effects of medication in office with pt. Pt verbalized understanding and is agreeable to plan of care.    Advised pt to immediately go to ER if he develops worsening shortness of breath, wheezing, fever, chills, dehydration, coughing up blood, and discussed other concerning s/s.     Advised pt to sleep with head of bed raised. Advised pt to quit smoking. I advised Kermit of the risks of continuing to use tobacco. During this visit, I spent <3 minutes counseling the patient regarding tobacco cessation. Pt declines interest in tobacco cessation.     Patient educated to follow up in 3 days or sooner than next scheduled appointment if symptoms worsen or do not improve. Patient stated understanding and has agreed with plan of care. After visit summary was printed and given to patient.      This document has been electronically signed by Anjali Reyes PA-C on February 25, 2020 10:20 AM,.

## 2020-02-26 ENCOUNTER — LAB (OUTPATIENT)
Dept: LAB | Facility: OTHER | Age: 65
End: 2020-02-26

## 2020-02-26 ENCOUNTER — TELEPHONE (OUTPATIENT)
Dept: FAMILY MEDICINE CLINIC | Facility: CLINIC | Age: 65
End: 2020-02-26

## 2020-02-26 DIAGNOSIS — J44.1 ACUTE EXACERBATION OF CHRONIC OBSTRUCTIVE PULMONARY DISEASE (COPD) (HCC): ICD-10-CM

## 2020-02-26 LAB — TSH SERPL DL<=0.05 MIU/L-ACNC: 0.75 UIU/ML (ref 0.27–4.2)

## 2020-02-26 PROCEDURE — 87070 CULTURE OTHR SPECIMN AEROBIC: CPT | Performed by: PHYSICIAN ASSISTANT

## 2020-02-26 PROCEDURE — 87205 SMEAR GRAM STAIN: CPT | Performed by: PHYSICIAN ASSISTANT

## 2020-02-28 ENCOUNTER — LAB (OUTPATIENT)
Dept: LAB | Facility: OTHER | Age: 65
End: 2020-02-28

## 2020-02-28 ENCOUNTER — OFFICE VISIT (OUTPATIENT)
Dept: FAMILY MEDICINE CLINIC | Facility: CLINIC | Age: 65
End: 2020-02-28

## 2020-02-28 VITALS
HEIGHT: 69 IN | BODY MASS INDEX: 21.77 KG/M2 | HEART RATE: 85 BPM | SYSTOLIC BLOOD PRESSURE: 132 MMHG | WEIGHT: 147 LBS | OXYGEN SATURATION: 96 % | TEMPERATURE: 99.3 F | DIASTOLIC BLOOD PRESSURE: 82 MMHG | RESPIRATION RATE: 18 BRPM

## 2020-02-28 DIAGNOSIS — J44.1 ACUTE EXACERBATION OF CHRONIC OBSTRUCTIVE PULMONARY DISEASE (COPD) (HCC): Primary | ICD-10-CM

## 2020-02-28 DIAGNOSIS — R06.02 SOB (SHORTNESS OF BREATH): ICD-10-CM

## 2020-02-28 DIAGNOSIS — R60.0 PEDAL EDEMA: ICD-10-CM

## 2020-02-28 LAB
ANION GAP SERPL CALCULATED.3IONS-SCNC: 7 MMOL/L (ref 5–15)
BACTERIA SPEC RESP CULT: NORMAL
BASOPHILS # BLD AUTO: 0.06 10*3/MM3 (ref 0–0.2)
BASOPHILS NFR BLD AUTO: 0.5 % (ref 0–1.5)
BNP SERPL-MCNC: 9.7 PG/ML (ref 0–100)
BUN BLD-MCNC: 13 MG/DL (ref 7–23)
BUN/CREAT SERPL: 19.4 (ref 7–25)
CALCIUM SPEC-SCNC: 9.3 MG/DL (ref 8.4–10.2)
CHLORIDE SERPL-SCNC: 98 MMOL/L (ref 101–112)
CO2 SERPL-SCNC: 34 MMOL/L (ref 22–30)
CREAT BLD-MCNC: 0.67 MG/DL (ref 0.7–1.3)
D-DIMER, QUANTITATIVE (MAD,POW, STR): <100 NG/ML (FEU)
DEPRECATED RDW RBC AUTO: 49.8 FL (ref 37–54)
EOSINOPHIL # BLD AUTO: 0 10*3/MM3 (ref 0–0.4)
EOSINOPHIL NFR BLD AUTO: 0 % (ref 0.3–6.2)
ERYTHROCYTE [DISTWIDTH] IN BLOOD BY AUTOMATED COUNT: 14.6 % (ref 12.3–15.4)
GFR SERPL CREATININE-BSD FRML MDRD: 119 ML/MIN/1.73 (ref 49–113)
GLUCOSE BLD-MCNC: 105 MG/DL (ref 70–99)
GRAM STN SPEC: NORMAL
HCT VFR BLD AUTO: 42.7 % (ref 37.5–51)
HGB BLD-MCNC: 13.3 G/DL (ref 13–17.7)
LYMPHOCYTES # BLD AUTO: 0.63 10*3/MM3 (ref 0.7–3.1)
LYMPHOCYTES NFR BLD AUTO: 5.4 % (ref 19.6–45.3)
MCH RBC QN AUTO: 30.2 PG (ref 26.6–33)
MCHC RBC AUTO-ENTMCNC: 31.1 G/DL (ref 31.5–35.7)
MCV RBC AUTO: 96.8 FL (ref 79–97)
MONOCYTES # BLD AUTO: 0.6 10*3/MM3 (ref 0.1–0.9)
MONOCYTES NFR BLD AUTO: 5.2 % (ref 5–12)
NEUTROPHILS # BLD AUTO: 10.27 10*3/MM3 (ref 1.7–7)
NEUTROPHILS NFR BLD AUTO: 88.9 % (ref 42.7–76)
PLATELET # BLD AUTO: 274 10*3/MM3 (ref 140–450)
PMV BLD AUTO: 9.5 FL (ref 6–12)
POTASSIUM BLD-SCNC: 4.2 MMOL/L (ref 3.4–5)
RBC # BLD AUTO: 4.41 10*6/MM3 (ref 4.14–5.8)
SODIUM BLD-SCNC: 139 MMOL/L (ref 137–145)
WBC NRBC COR # BLD: 11.56 10*3/MM3 (ref 3.4–10.8)

## 2020-02-28 PROCEDURE — 85379 FIBRIN DEGRADATION QUANT: CPT | Performed by: PHYSICIAN ASSISTANT

## 2020-02-28 PROCEDURE — 85025 COMPLETE CBC W/AUTO DIFF WBC: CPT | Performed by: PHYSICIAN ASSISTANT

## 2020-02-28 PROCEDURE — 99213 OFFICE O/P EST LOW 20 MIN: CPT | Performed by: PHYSICIAN ASSISTANT

## 2020-02-28 PROCEDURE — 80048 BASIC METABOLIC PNL TOTAL CA: CPT | Performed by: PHYSICIAN ASSISTANT

## 2020-02-28 PROCEDURE — 36415 COLL VENOUS BLD VENIPUNCTURE: CPT | Performed by: PHYSICIAN ASSISTANT

## 2020-02-28 PROCEDURE — 83880 ASSAY OF NATRIURETIC PEPTIDE: CPT | Performed by: PHYSICIAN ASSISTANT

## 2020-02-28 RX ORDER — FUROSEMIDE 40 MG/1
TABLET ORAL
Qty: 10 TABLET | Refills: 0 | Status: SHIPPED | OUTPATIENT
Start: 2020-02-28 | End: 2020-03-02 | Stop reason: SDUPTHER

## 2020-02-28 RX ORDER — POTASSIUM CHLORIDE 20 MEQ/1
TABLET, EXTENDED RELEASE ORAL
Qty: 10 TABLET | Refills: 0 | Status: SHIPPED | OUTPATIENT
Start: 2020-02-28 | End: 2020-03-02 | Stop reason: SDUPTHER

## 2020-03-02 ENCOUNTER — OFFICE VISIT (OUTPATIENT)
Dept: FAMILY MEDICINE CLINIC | Facility: CLINIC | Age: 65
End: 2020-03-02

## 2020-03-02 VITALS
SYSTOLIC BLOOD PRESSURE: 130 MMHG | OXYGEN SATURATION: 97 % | DIASTOLIC BLOOD PRESSURE: 80 MMHG | TEMPERATURE: 98.7 F | BODY MASS INDEX: 21.77 KG/M2 | WEIGHT: 147 LBS | HEART RATE: 98 BPM | HEIGHT: 69 IN

## 2020-03-02 DIAGNOSIS — J44.1 ACUTE EXACERBATION OF CHRONIC OBSTRUCTIVE PULMONARY DISEASE (COPD) (HCC): Primary | ICD-10-CM

## 2020-03-02 DIAGNOSIS — F17.200 TOBACCO DEPENDENCE SYNDROME: ICD-10-CM

## 2020-03-02 DIAGNOSIS — R60.0 PEDAL EDEMA: ICD-10-CM

## 2020-03-02 DIAGNOSIS — J96.12 CHRONIC RESPIRATORY FAILURE WITH HYPERCAPNIA (HCC): ICD-10-CM

## 2020-03-02 DIAGNOSIS — J44.9 CHRONIC BRONCHITIS WITH COPD (CHRONIC OBSTRUCTIVE PULMONARY DISEASE) (HCC): ICD-10-CM

## 2020-03-02 DIAGNOSIS — Z99.81 REQUIRES SUPPLEMENTAL OXYGEN: ICD-10-CM

## 2020-03-02 PROCEDURE — 99213 OFFICE O/P EST LOW 20 MIN: CPT | Performed by: PHYSICIAN ASSISTANT

## 2020-03-02 RX ORDER — POTASSIUM CHLORIDE 20 MEQ/1
TABLET, EXTENDED RELEASE ORAL
Qty: 21 TABLET | Refills: 0 | Status: SHIPPED | OUTPATIENT
Start: 2020-03-02 | End: 2020-04-16 | Stop reason: SDUPTHER

## 2020-03-02 RX ORDER — FUROSEMIDE 40 MG/1
TABLET ORAL
Qty: 21 TABLET | Refills: 0 | Status: SHIPPED | OUTPATIENT
Start: 2020-03-02 | End: 2020-04-16 | Stop reason: SDUPTHER

## 2020-03-02 NOTE — PROGRESS NOTES
Subjective   Kermit Corley is a 64 y.o. male.     History of Present Illness   Patient presents for a follow up. Overall reports feeling 30% better.     Peripheral edema: Patient was seen on 2/28/20 with pedal edema. Reports dyspnea is a little better since last visit. Furosemide was increased last visit, and is now taking daily - started today. Reports much improvement with furosemide. D-dimer was obtained - wnl. BNP was also obtained - 9.7, wnl. Denies any calf pain/tenderness. Reports elevating legs and wearing bilateral compression stockings with improvement in bilateral edema. RLE has significantly improved and occupied with 3 fluid blisters to right foot. LLE has improved; however, more edema than the RLE. Patient reports being able to wear shoes now. Denies numbness to BLE.     Levaquin course will be completed tomorrow. Still taking Prednisone. Pt admits to productive cough of purulent sputum. Reports sore throat has significantly improved. Still admits to congestion and rhinorrhea but improved since last visit - mostly in the morning. Still doing albuterol inhaler Q4H for dyspnea. Admits to smoking 1/2 ppd. Sputum culture on 2/26/10 noted for normal respiratory ashia.      Has not had echo yet r/t awaiting to be scheduled. Followed by Dr. Waite, cardiology.     The following portions of the patient's history were reviewed and updated as appropriate: allergies, current medications, past family history, past medical history, past social history, past surgical history and problem list.    Review of Systems   Constitutional: Negative for activity change, appetite change, chills, diaphoresis, fatigue, fever, unexpected weight gain and unexpected weight loss.   HENT: Positive for congestion, rhinorrhea and sinus pressure. Negative for dental problem, drooling, ear discharge, ear pain, facial swelling, hearing loss, mouth sores, nosebleeds, postnasal drip, sneezing, sore throat, swollen glands,  tinnitus, trouble swallowing and voice change.    Eyes: Negative for blurred vision, double vision and visual disturbance.   Respiratory: Positive for cough (purulent sputum) and shortness of breath. Negative for apnea, choking, chest tightness, wheezing and stridor.    Cardiovascular: Negative for chest pain, palpitations and leg swelling.   Gastrointestinal: Negative for abdominal distention, abdominal pain, constipation, diarrhea, nausea, vomiting, GERD and indigestion.   Endocrine: Negative.    Skin: Negative.    Neurological: Negative for dizziness, weakness, light-headedness, numbness and confusion.   Psychiatric/Behavioral: Negative.        Objective   Physical Exam   Constitutional: He is oriented to person, place, and time. He appears well-developed and well-nourished. He is active. He does not appear ill. No distress.   Appears frail   HENT:   Head: Normocephalic and atraumatic.   Right Ear: External ear normal. cerumen impaction is present.  Left Ear: External ear normal. An impacted cerumen is present.  Nose: Nose normal.   Mouth/Throat: Posterior oropharyngeal edema present. No oropharyngeal exudate.   Eyes: Pupils are equal, round, and reactive to light. Conjunctivae and EOM are normal.   Neck: Normal range of motion. Neck supple.   Cardiovascular: Normal rate, regular rhythm, normal heart sounds and intact distal pulses. Exam reveals no gallop and no friction rub.   No murmur heard.  Pulses:       Dorsalis pedis pulses are 2+ on the right side, and 2+ on the left side.        Posterior tibial pulses are 2+ on the right side, and 2+ on the left side.   Pulmonary/Chest: Effort normal. No stridor. No respiratory distress. He has wheezes in the right upper field, the right middle field, the left upper field and the left middle field. He has no rales. He exhibits no tenderness.   Abdominal: Soft. Bowel sounds are normal. He exhibits no distension and no mass. There is no tenderness. There is no rebound and  no guarding. No hernia.   Musculoskeletal: Normal range of motion. He exhibits edema (1+ RLE, 2+ LLE).        Right ankle: He exhibits swelling.        Left ankle: He exhibits swelling.   Negative rome's sign BLE. DP 2+ ble, PT 2+ ble.   Neurological: He is alert and oriented to person, place, and time.   Skin: Skin is warm and dry. Capillary refill takes less than 2 seconds. No rash noted. He is not diaphoretic. No erythema. No pallor.        Psychiatric: He has a normal mood and affect. His behavior is normal. Judgment and thought content normal.   Nursing note and vitals reviewed.    Vitals:    03/02/20 1422   BP: 130/80   Pulse: 98   Temp: 98.7 °F (37.1 °C)   SpO2: 97%        Assessment/Plan      Kermit was seen today for follow-up, copd and leg swelling.    Diagnoses and all orders for this visit:    Acute exacerbation of chronic obstructive pulmonary disease (COPD) (CMS/HCC)    Pedal edema  -     furosemide (LASIX) 40 MG tablet; Take 1 tablet bid x 7 days. Week 2 take 1 tablet daily.  -     potassium chloride (K-DUR,KLOR-CON) 20 MEQ CR tablet; Take 1 tablet bid x 7 days. Week 2 take 1 tablet daily.  -     Basic metabolic panel; Future    Chronic respiratory failure with hypercapnia (CMS/HCC)    Chronic bronchitis with COPD (chronic obstructive pulmonary disease) (CMS/HCC)    Requires supplemental oxygen    Tobacco dependence syndrome      Acute copd exacerbation/dyspnea - improving, Continue levaquin as prescribed until completion. Will plan to obtain echo as above for further evaluation of dyspnea to r/o chf. Advised to continue prednisone taper as prescribed until completion. Advised to continue bronchodilators as prescribed. Continue 2L O2 continuous oxygen supplementation. Continue mucinex otc as directed. Advised to alternate tylenol/ibuprofen otc q 4-6 hours prn for mild pain/fever. Sputum culture on 2/26/10 noted for normal respiratory ashia. cxr performed on 2/23/20 - noted for no acute pleural  parenchymal process in lung fields Pt verbalized understanding and is agreeable to plan of care. Advised pt to sleep with head of bed raised.     Tobacco dependence syndrome - 1/2 ppd x 40 years. Advised pt to quit smoking. I advised Kermit of the risks of continuing to use tobacco. During this visit, I spent <3 minutes counseling the patient regarding tobacco cessation. Pt declines interest in tobacco cessation.     Pedal edema - improving with lasix. Continue lasix and potassium as prescribed. stat labs 2/28/20 cbc improving from 14 to 11.5 today, bnp wnl, d-dimer wnl, bmp co2 elevated to 34, otherwise grossly normal. Advised to keep lower extremities elevated while at rest, begin bilateral compression stockings, limit sodium intake to <2g daily. Obtain echo as above for further evaluation due to dyspnea and peripheral edema to r/o chf. Advised do not squeeze/pop fluid filled blisters, keep covered with thick bandage, avoid friction.      Patient educated to follow up in 1 week for recheck or sooner than next scheduled appointment if symptoms worsen or do not improve. Patient stated understanding and has agreed with plan of care. After visit summary was printed and given to patient.      This document has been electronically signed by Anjali Reyes PA-C on March 19, 2020 15:22,.

## 2020-03-10 ENCOUNTER — LAB (OUTPATIENT)
Dept: LAB | Facility: OTHER | Age: 65
End: 2020-03-10

## 2020-03-10 ENCOUNTER — OFFICE VISIT (OUTPATIENT)
Dept: FAMILY MEDICINE CLINIC | Facility: CLINIC | Age: 65
End: 2020-03-10

## 2020-03-10 VITALS
HEART RATE: 77 BPM | WEIGHT: 138.4 LBS | SYSTOLIC BLOOD PRESSURE: 128 MMHG | BODY MASS INDEX: 20.5 KG/M2 | DIASTOLIC BLOOD PRESSURE: 84 MMHG | HEIGHT: 69 IN | OXYGEN SATURATION: 97 % | RESPIRATION RATE: 16 BRPM | TEMPERATURE: 98.6 F

## 2020-03-10 DIAGNOSIS — R60.0 PEDAL EDEMA: ICD-10-CM

## 2020-03-10 DIAGNOSIS — L98.491 SUPERFICIAL ULCER OF SKIN (HCC): ICD-10-CM

## 2020-03-10 DIAGNOSIS — J44.9 CHRONIC BRONCHITIS WITH COPD (CHRONIC OBSTRUCTIVE PULMONARY DISEASE) (HCC): ICD-10-CM

## 2020-03-10 DIAGNOSIS — Z99.81 REQUIRES SUPPLEMENTAL OXYGEN: ICD-10-CM

## 2020-03-10 DIAGNOSIS — J44.1 ACUTE EXACERBATION OF CHRONIC OBSTRUCTIVE PULMONARY DISEASE (COPD) (HCC): ICD-10-CM

## 2020-03-10 DIAGNOSIS — J96.12 CHRONIC RESPIRATORY FAILURE WITH HYPERCAPNIA (HCC): ICD-10-CM

## 2020-03-10 DIAGNOSIS — R07.81 RIB PAIN: Primary | ICD-10-CM

## 2020-03-10 DIAGNOSIS — F17.200 TOBACCO DEPENDENCE SYNDROME: ICD-10-CM

## 2020-03-10 LAB
ANION GAP SERPL CALCULATED.3IONS-SCNC: 12 MMOL/L (ref 5–15)
BUN BLD-MCNC: 20 MG/DL (ref 7–23)
BUN/CREAT SERPL: 24.1 (ref 7–25)
CALCIUM SPEC-SCNC: 9.8 MG/DL (ref 8.4–10.2)
CHLORIDE SERPL-SCNC: 96 MMOL/L (ref 101–112)
CO2 SERPL-SCNC: 32 MMOL/L (ref 22–30)
CREAT BLD-MCNC: 0.83 MG/DL (ref 0.7–1.3)
GFR SERPL CREATININE-BSD FRML MDRD: 93 ML/MIN/1.73 (ref 49–113)
GLUCOSE BLD-MCNC: 139 MG/DL (ref 70–99)
POTASSIUM BLD-SCNC: 3.9 MMOL/L (ref 3.4–5)
SODIUM BLD-SCNC: 140 MMOL/L (ref 137–145)

## 2020-03-10 PROCEDURE — 36415 COLL VENOUS BLD VENIPUNCTURE: CPT | Performed by: PHYSICIAN ASSISTANT

## 2020-03-10 PROCEDURE — 80048 BASIC METABOLIC PNL TOTAL CA: CPT | Performed by: PHYSICIAN ASSISTANT

## 2020-03-10 PROCEDURE — 99214 OFFICE O/P EST MOD 30 MIN: CPT | Performed by: PHYSICIAN ASSISTANT

## 2020-03-10 NOTE — PROGRESS NOTES
Subjective   Kermit Corley is a 64 y.o. male.     History of Present Illness     Patient present for a follow on pedal edema. Reports significant improve in BLE. Patient completed Lasix 40mg BID today, then starts 40mg daily tomorrow. Reports elevating legs and wearing bilateral compression stockings with improvement in bilateral edema. Denies any calf pain/tenderness, skin color changes. Echocardiogram performed 3/9/20 noted for moderate AVR and mild MVR, EF in 56-60% range with normal LV contractility.    Clear fluid filled vesicles x3 has ruptured that were located to right foot. Reports has been applying Neosporin a couple of times a day. Denies erythema, discharge, ttp, skin color changes to affected area.     Admits to 40-50% improvement in dyspnea. C/o right sided rib pain with coughing today. Per pt rib pain only occurs with coughing. Admits to cough at baseline with clear sputum production. Admits to minimal rhinorrhea.Sputum culture on 2/26/10 noted for normal respiratory ashia. Denies fever, chills, myalgias. His symptoms are aggravated by any activity, climbing stairs, exercise, minimal activity, strenuous activity, exposure to smoke and URI. Admits to smoking 1/2 ppd. Pt reports using bronchodilators as prescribed. Reports he has since completed levaquin and has a couple days of prednisone left due to missed doses. Nasal canula in place.    The following portions of the patient's history were reviewed and updated as appropriate: allergies, current medications, past family history, past medical history, past social history, past surgical history and problem list.    Review of Systems   Constitutional: Negative for activity change, appetite change, chills, diaphoresis, fatigue, fever, unexpected weight gain and unexpected weight loss.   HENT: Negative for congestion, dental problem, drooling, ear discharge, ear pain, facial swelling, hearing loss, mouth sores, nosebleeds, postnasal drip,  rhinorrhea, sinus pressure, sneezing, sore throat, swollen glands, tinnitus, trouble swallowing and voice change.    Eyes: Negative for blurred vision, double vision and visual disturbance.   Respiratory: Positive for cough (improving) and shortness of breath (improving). Negative for apnea, choking, chest tightness, wheezing and stridor.    Cardiovascular: Positive for leg swelling (significantly improved). Negative for chest pain and palpitations.   Gastrointestinal: Negative for abdominal distention, abdominal pain, constipation, diarrhea, nausea, vomiting, GERD and indigestion.   Endocrine: Negative.    Musculoskeletal: Negative for arthralgias, back pain, gait problem, joint swelling, myalgias, neck pain, neck stiffness and bursitis.   Skin: Positive for skin lesions (Right foot).   Neurological: Negative for dizziness, weakness, light-headedness, headache and confusion.   Psychiatric/Behavioral: Negative.        Objective   Physical Exam   Constitutional: He is oriented to person, place, and time. He appears well-developed and well-nourished. No distress.   HENT:   Head: Normocephalic and atraumatic.   Right Ear: External ear normal.   Left Ear: External ear normal.   Nose: Nose normal.   Mouth/Throat: Oropharynx is clear and moist. No oropharyngeal exudate.   Eyes: Pupils are equal, round, and reactive to light. Conjunctivae and EOM are normal.   Neck: Normal range of motion. Neck supple.   Cardiovascular: Normal rate, regular rhythm, normal heart sounds and intact distal pulses. Exam reveals no gallop and no friction rub.   No murmur heard.  Pulmonary/Chest: Effort normal. No stridor. No respiratory distress. He has decreased breath sounds (chronic lung changes). He has no wheezes. He has no rhonchi. He has no rales. He exhibits no tenderness.   Supplemental oxygen by nasal cannula in place.    Abdominal: Soft. Bowel sounds are normal. He exhibits no distension and no mass. There is no tenderness. There is  no rebound and no guarding. No hernia.   Musculoskeletal: Normal range of motion. He exhibits edema (trace ble).   Ambulating with cane   Neurological: He is alert and oriented to person, place, and time.   Skin: Skin is warm and dry. Capillary refill takes less than 2 seconds. No rash noted. He is not diaphoretic. No erythema. No pallor.   Open 1mm round ruptured vesicles x 3 to top of right foot - well healing.        Psychiatric: He has a normal mood and affect. His behavior is normal. Judgment and thought content normal.   Nursing note and vitals reviewed.    Vitals:    03/10/20 1321   BP: 128/84   Pulse: 77   Resp: 16   Temp: 98.6 °F (37 °C)   SpO2: 97%        Assessment/Plan   Kermit was seen today for edema.    Diagnoses and all orders for this visit:    Rib pain  -     XR Ribs Right With PA Chest; Future    Superficial ulcer of skin (CMS/HCC)  -     mupirocin (BACTROBAN) 2 % ointment; Apply  topically to the appropriate area as directed 2 (Two) Times a Day. Apply to right foot    Chronic bronchitis with COPD (chronic obstructive pulmonary disease) (CMS/HCC)    Requires supplemental oxygen    Chronic respiratory failure with hypercapnia (CMS/HCC)    Pedal edema    Tobacco dependence syndrome    Acute exacerbation of chronic obstructive pulmonary disease (COPD) (CMS/HCC)      Acute copd exacerbation/dyspnea - continued improvement. Advised to continue prednisone taper as prescribed until completion. Echocardiogram obtained due to dyspnea, noted for moderate AVR and mild MVR, EF in 56-60% range with normal LV contractility. Encouraged to f/u with cardiology as he is established with Dr. Waite for monitoring of moderate AVR. EF wnl and LV normal contractility. Advised to continue bronchodilators as prescribed. Continue 2L O2 continuous oxygen supplementation. Continue mucinex otc as directed. Advised to alternate tylenol/ibuprofen otc q 4-6 hours prn for mild pain/fever. Sputum culture on 2/26/10 noted for  normal respiratory ashia. Advised pt to sleep with head of bed raised. F/u appt with pulmonology scheduled 4/20/20.    Due to rib pain will plan to obtain xr right ribs with pa chest to evaluate for rib fx - unremarkable right rib detail, no fx or acute abnormality seen. Pt advised if no improvement or worsening of sx to rtc in 1-2 days and will consider ct chest to r/o rib fx and additional acute processes.    Tobacco dependence syndrome - 1/2 ppd x 40 years. Advised pt to quit smoking. I advised Kermit of the risks of continuing to use tobacco. During this visit, I spent <3 minutes counseling the patient regarding tobacco cessation. Pt declines interest in tobacco cessation    Pedal edema - significant improvement with lasix. Continue as prescribed. Advised to keep lower extremities elevated while at rest, begin bilateral compression stockings, limit sodium intake to <2g daily. Repeat bnp on 3/10/20 with potassium wnl.     Superficial ulcer of skin - right dorsal foot. Cleaned with hibiclens in office and dressed with mupirocin. Cushion foam absorbent bandage applied to area. Advised to keep affected area clean and dry, monitor any s/s of infection or poorly healing wound. Advised to have area covered with foam bandage to prevent friction when wearing shoes. Advised to wear well fitting shoes and always wear socks with shoes.     Patient educated to follow up in 1 month or sooner than next scheduled appointment if symptoms worsen or do not improve. Patient stated understanding and has agreed with plan of care. After visit summary was printed and given to patient.      This document has been electronically signed by Anjali Reyes PA-C on March 31, 2020 14:09,.

## 2020-03-12 ENCOUNTER — TELEPHONE (OUTPATIENT)
Dept: FAMILY MEDICINE CLINIC | Facility: CLINIC | Age: 65
End: 2020-03-12

## 2020-03-13 ENCOUNTER — TELEPHONE (OUTPATIENT)
Dept: FAMILY MEDICINE CLINIC | Facility: CLINIC | Age: 65
End: 2020-03-13

## 2020-03-27 DIAGNOSIS — J44.9 COPD, SEVERE (HCC): ICD-10-CM

## 2020-03-27 DIAGNOSIS — J44.9 CHRONIC BRONCHITIS WITH COPD (CHRONIC OBSTRUCTIVE PULMONARY DISEASE) (HCC): ICD-10-CM

## 2020-03-27 RX ORDER — IPRATROPIUM BROMIDE AND ALBUTEROL SULFATE 2.5; .5 MG/3ML; MG/3ML
SOLUTION RESPIRATORY (INHALATION)
Qty: 360 ML | Refills: 0 | Status: SHIPPED | OUTPATIENT
Start: 2020-03-27 | End: 2020-04-27

## 2020-04-16 ENCOUNTER — TELEMEDICINE (OUTPATIENT)
Dept: FAMILY MEDICINE CLINIC | Facility: CLINIC | Age: 65
End: 2020-04-16

## 2020-04-16 DIAGNOSIS — Z91.89 CANDIDATE FOR STATIN THERAPY DUE TO RISK OF FUTURE CARDIOVASCULAR EVENT: ICD-10-CM

## 2020-04-16 DIAGNOSIS — E83.42 HYPOMAGNESEMIA: ICD-10-CM

## 2020-04-16 DIAGNOSIS — J96.12 CHRONIC RESPIRATORY FAILURE WITH HYPERCAPNIA (HCC): ICD-10-CM

## 2020-04-16 DIAGNOSIS — E78.00 ELEVATED LDL CHOLESTEROL LEVEL: ICD-10-CM

## 2020-04-16 DIAGNOSIS — Z99.81 REQUIRES SUPPLEMENTAL OXYGEN: ICD-10-CM

## 2020-04-16 DIAGNOSIS — J18.9 PNEUMONIA OF RIGHT LOWER LOBE DUE TO INFECTIOUS ORGANISM: ICD-10-CM

## 2020-04-16 DIAGNOSIS — I10 ESSENTIAL HYPERTENSION: ICD-10-CM

## 2020-04-16 DIAGNOSIS — J44.9 COPD, SEVERE (HCC): ICD-10-CM

## 2020-04-16 DIAGNOSIS — R60.0 PEDAL EDEMA: ICD-10-CM

## 2020-04-16 DIAGNOSIS — Z12.5 SCREENING FOR MALIGNANT NEOPLASM OF PROSTATE: ICD-10-CM

## 2020-04-16 DIAGNOSIS — F51.01 PRIMARY INSOMNIA: ICD-10-CM

## 2020-04-16 DIAGNOSIS — N40.1 BENIGN PROSTATIC HYPERPLASIA WITH URINARY FREQUENCY: ICD-10-CM

## 2020-04-16 DIAGNOSIS — Z87.891 PERSONAL HISTORY OF NICOTINE DEPENDENCE: ICD-10-CM

## 2020-04-16 DIAGNOSIS — Z13.29 SCREENING FOR THYROID DISORDER: ICD-10-CM

## 2020-04-16 DIAGNOSIS — Z12.2 ENCOUNTER FOR SCREENING FOR LUNG CANCER: ICD-10-CM

## 2020-04-16 DIAGNOSIS — R35.0 BENIGN PROSTATIC HYPERPLASIA WITH URINARY FREQUENCY: ICD-10-CM

## 2020-04-16 DIAGNOSIS — J44.1 COPD WITH ACUTE EXACERBATION (HCC): ICD-10-CM

## 2020-04-16 DIAGNOSIS — F17.200 TOBACCO DEPENDENCE SYNDROME: ICD-10-CM

## 2020-04-16 DIAGNOSIS — J44.1 ACUTE EXACERBATION OF CHRONIC OBSTRUCTIVE PULMONARY DISEASE (COPD) (HCC): Primary | ICD-10-CM

## 2020-04-16 PROCEDURE — 99214 OFFICE O/P EST MOD 30 MIN: CPT | Performed by: PHYSICIAN ASSISTANT

## 2020-04-16 RX ORDER — MONTELUKAST SODIUM 10 MG/1
10 TABLET ORAL NIGHTLY
Qty: 30 TABLET | Refills: 5 | Status: SHIPPED | OUTPATIENT
Start: 2020-04-16 | End: 2020-10-09 | Stop reason: SDUPTHER

## 2020-04-16 RX ORDER — PREDNISONE 10 MG/1
TABLET ORAL
Qty: 21 TABLET | Refills: 0 | Status: SHIPPED | OUTPATIENT
Start: 2020-04-16 | End: 2020-05-21

## 2020-04-16 RX ORDER — TAMSULOSIN HYDROCHLORIDE 0.4 MG/1
1 CAPSULE ORAL NIGHTLY
Qty: 90 CAPSULE | Refills: 1 | Status: SHIPPED | OUTPATIENT
Start: 2020-04-16 | End: 2020-10-09 | Stop reason: SDUPTHER

## 2020-04-16 RX ORDER — ALBUTEROL SULFATE 90 UG/1
2 AEROSOL, METERED RESPIRATORY (INHALATION) EVERY 6 HOURS PRN
Qty: 1 INHALER | Refills: 11 | Status: SHIPPED | OUTPATIENT
Start: 2020-04-16 | End: 2021-01-28 | Stop reason: SDUPTHER

## 2020-04-16 RX ORDER — FUROSEMIDE 40 MG/1
40 TABLET ORAL DAILY PRN
Qty: 30 TABLET | Refills: 0 | Status: SHIPPED | OUTPATIENT
Start: 2020-04-16 | End: 2021-08-30 | Stop reason: SDUPTHER

## 2020-04-16 RX ORDER — POTASSIUM CHLORIDE 20 MEQ/1
TABLET, EXTENDED RELEASE ORAL
Qty: 30 TABLET | Refills: 0 | Status: SHIPPED | OUTPATIENT
Start: 2020-04-16 | End: 2021-08-30 | Stop reason: SDUPTHER

## 2020-04-16 RX ORDER — AMITRIPTYLINE HYDROCHLORIDE 100 MG/1
100 TABLET, FILM COATED ORAL NIGHTLY
Qty: 90 TABLET | Refills: 1 | Status: SHIPPED | OUTPATIENT
Start: 2020-04-16 | End: 2020-10-09 | Stop reason: SDUPTHER

## 2020-04-16 RX ORDER — DOXYCYCLINE HYCLATE 100 MG/1
100 CAPSULE ORAL 2 TIMES DAILY
Qty: 20 CAPSULE | Refills: 0 | Status: SHIPPED | OUTPATIENT
Start: 2020-04-16 | End: 2020-05-21

## 2020-04-16 NOTE — PROGRESS NOTES
Subjective   Kermit Corley is a 65 y.o. male.   You have chosen to receive care through a telehealth visit.  Do you consent to use a video/audio connection for your medical care today? Yes   History of Present Illness     Pt presents today via video encounter for a 3 month f/u on chronic conditions.     Tobacco dependence due to cigarettes - decreased to 1/4 ppd x 1 week. Hx 1ppd x 45 years. Last ct low dose lung cancer screen 4/17/19 - noted for no suspicious nodules.     COPD, chronic respiratory failure - chronic, managed with albuterol, advair, singulair, incruse ellipta, 2L continuous supplemental oxygen. Followed by Dr. Lee, pulmonology.     Reports acute worsening of copd with flare x 7-10 days. Reports gradually worsening dyspnea, productive cough of purulent sputum, worsening of wheezing, pleuritic chest tightness. Reports he presented to [facility] ER on 4/4/20 where he was dx with acute copd exacerbation - cxr performed noted for no acute process. COVID-19 testing performed at that time with negative results. Influenza swab - negative. BNP wnl, troponin normal, ck normal. abg without evidence of respiratory acidosis. ekg nsr without ischemic changes. Pt was given iv solu-medrol and d/c home with zpak. Pt reports his sx are consistent with previous flare of copd and reports he typically experiences copd flare around this time of year. Pt reports he has been taking otc mucinex with minimal improvement.     Hx tachyarrhythmias, chest pain, htn - followed by Dr. Waite, Cardiology managed with bystolic, daily 81mg ASA, nitrostat prn.    BPH - managed with flomax     Insomnia - controlled with amitriptyline.      Hyperlipidemia - managed with atorvastatin.     Peripheral edema - chronic, managed with lasix daily prn. Reports having to take medication 3-4 days of the month. Echo performed 3/9/20 noted for normal EF (56-60%), normal lef ventricular wall size, thickness, and contractility. Mild MRV,  moderate AVR.     The following portions of the patient's history were reviewed and updated as appropriate: allergies, current medications, past family history, past medical history, past social history, past surgical history and problem list.    Review of Systems   Constitutional: Positive for fatigue. Negative for activity change, appetite change, chills, diaphoresis, fever, unexpected weight gain and unexpected weight loss.   HENT: Positive for congestion, postnasal drip and rhinorrhea. Negative for dental problem, drooling, ear discharge, ear pain, facial swelling, hearing loss, mouth sores, nosebleeds, sinus pressure, sneezing, sore throat, swollen glands, tinnitus, trouble swallowing and voice change.    Eyes: Negative for blurred vision, double vision and visual disturbance.   Respiratory: Positive for cough, chest tightness, shortness of breath and wheezing (worse than baseline). Negative for apnea, choking and stridor.    Cardiovascular: Negative for chest pain, palpitations and leg swelling.   Gastrointestinal: Negative for abdominal distention, abdominal pain, constipation, diarrhea, nausea, vomiting, GERD and indigestion.   Endocrine: Negative.    Genitourinary: Negative for dysuria.   Musculoskeletal: Negative for myalgias and neck pain.   Skin: Negative.  Negative for rash.   Neurological: Negative for dizziness, weakness, headache and confusion.   Psychiatric/Behavioral: Negative.        Objective   Physical Exam   Constitutional: He is oriented to person, place, and time. He appears well-developed and well-nourished. He is active and cooperative. He has a sickly appearance. No distress.   Appears frail   HENT:   Head: Normocephalic and atraumatic.   Right Ear: External ear normal.   Left Ear: Hearing and external ear normal.   Nose: Rhinorrhea and congestion present. No mucosal edema. Right sinus exhibits no maxillary sinus tenderness and no frontal sinus tenderness. Left sinus exhibits no maxillary  sinus tenderness and no frontal sinus tenderness.   Mouth/Throat: Uvula is midline and mucous membranes are normal. Mucous membranes are not pale, not dry and not cyanotic. Posterior oropharyngeal erythema present. No oropharyngeal exudate, posterior oropharyngeal edema or tonsillar abscesses. Tonsils are 0 on the right. Tonsils are 0 on the left. No tonsillar exudate.   Eyes: Pupils are equal, round, and reactive to light. Conjunctivae and EOM are normal.   Neck: Normal range of motion. Neck supple.   Pulmonary/Chest: No accessory muscle usage or stridor. No tachypnea and no bradypnea. No respiratory distress. Decreased breath sounds:   He has wheezes. He has no rhonchi.   Supplemental oxygen by nasal cannula in place.   Musculoskeletal: Normal range of motion. He exhibits no edema.   Lymphadenopathy:     He has no cervical adenopathy.   Neurological: He is alert and oriented to person, place, and time.   Skin: Skin is warm and dry. No rash noted. He is not diaphoretic. No erythema. No pallor.   Psychiatric: He has a normal mood and affect. His behavior is normal. Judgment and thought content normal.     Physical exam limited due to telehealth encounter.    Assessment/Plan   Diagnoses and all orders for this visit:    Acute exacerbation of chronic obstructive pulmonary disease (COPD) (CMS/[facility])  -     predniSONE (DELTASONE) 10 MG (48) tablet pack; 2 tablets by mouth daily for 1 week, then 1 tablet daily for 1 week  -     doxycycline (VIBRAMYCIN) 100 MG capsule; Take 1 capsule by mouth 2 (Two) Times a Day.  -     albuterol sulfate  (90 Base) MCG/ACT inhaler; Inhale 2 puffs Every 6 (Six) Hours As Needed for Wheezing or Shortness of Air.    Tobacco dependence syndrome  -     CT Chest Low Dose Wo; Future    COPD, severe (CMS/[facility])  -     montelukast (SINGULAIR) 10 MG tablet; Take 1 tablet by mouth Every Night.    Requires supplemental oxygen    Pedal edema  -     furosemide (Lasix) 40 MG tablet; Take 1 tablet by  mouth Daily As Needed (peripheral edema).  -     potassium chloride (K-DUR,KLOR-CON) 20 MEQ CR tablet; Take 1 tablet PO with lasix daily prn.    Chronic respiratory failure with hypercapnia (CMS/HCC)  -     CBC & Differential; Future  -     Comprehensive metabolic panel; Future    Benign prostatic hyperplasia with urinary frequency  -     tamsulosin (FLOMAX) 0.4 MG capsule 24 hr capsule; Take 1 capsule by mouth Every Night.    Primary insomnia  -     amitriptyline (ELAVIL) 100 MG tablet; Take 1 tablet by mouth Every Night.    Candidate for statin therapy due to risk of future cardiovascular event  -     Lipid panel; Future    Encounter for screening for lung cancer  -     CT Chest Low Dose Wo; Future    Personal history of nicotine dependence   -     CT Chest Low Dose Wo; Future    Pneumonia of right lower lobe due to infectious organism (CMS/HCC)  -     albuterol sulfate  (90 Base) MCG/ACT inhaler; Inhale 2 puffs Every 6 (Six) Hours As Needed for Wheezing or Shortness of Air.    COPD with acute exacerbation (CMS/HCC)  -     Umeclidinium Bromide (INCRUSE ELLIPTA) 62.5 MCG/INH aerosol powder ; Inhale 1 puff Daily.    Screening for thyroid disorder  -     TSH; Future  -     T4, free; Future    Hypomagnesemia  -     Magnesium; Future    Screening for malignant neoplasm of prostate  -     PSA Screen; Future    Elevated LDL cholesterol level  -     Lipid panel; Future    Essential hypertension  -     Lipid panel; Future      Baseline labs ordered as above for further evaluation and assessment of chronic conditions. Will notify pt of results when received and address appropriately    Acute copd exacerbation - cxr on 4/4/20 noted for no acute process performed externally at [facility] [place] er. Pt verbalizes sx are comparable with previous copd exacerbations in the past. Negative covid-19 testing 4/4/20. Begin doxycycline, prednisone taper as above. Continue otc mucinex as directed. Continue bronchodilators as prescribed.  Advised pt to maintain good I&O & maintain good hand hygiene. Discussed sleeping propped up for improvement in dyspnea. Discussed compliance with wearing supplemental oxygen. Advised if no improvement in 3-4 days to f/u in office. Discussed concerning s/s to immediately rtc for recheck or go to ER for evaluation and tx including but not limited to chest pain, dyspnea, fever, chills, decreased I&O, AMS. Pt verbalized understanding.     COPD, chronic respiratory failure with hypercapnia - managed with albuterol, advair, singulair, incruse ellipta, 2L continuous supplemental oxygen. Followed by Dr. Lee pulmonology .    Tobacco dependence due to cigarettes - decreased to 1/4ppd x 1 week. Hx of 1ppd x 45 years. Congratulated on cutting down on tobacco use. Declined interest in tobacco cessation. I advised Kermit of the risks of continuing to use tobacco. Due ldct screen for lung cancer - ordered as above.     Hx tachyarrhythmias, chest pain, htn - followed by Dr. Waite, Cardiology managed with bystolic, daily 81mg ASA, nitrostat prn.     BPH - managed with flomax     Insomnia - controlled with amitriptyline.      Hyperlipidemia - managed with atorvastatin.       Patient educated to follow up in 1 week or sooner than next scheduled appointment if symptoms worsen or do not improve. Patient stated understanding and has agreed with plan of care. After visit summary was printed and given to patient.      This document has been electronically signed by Anjali Reyes PA-C on April 16, 2020 10:26,.

## 2020-04-27 DIAGNOSIS — J44.9 CHRONIC BRONCHITIS WITH COPD (CHRONIC OBSTRUCTIVE PULMONARY DISEASE) (HCC): ICD-10-CM

## 2020-04-27 DIAGNOSIS — J44.9 COPD, SEVERE (HCC): ICD-10-CM

## 2020-04-27 RX ORDER — IPRATROPIUM BROMIDE AND ALBUTEROL SULFATE 2.5; .5 MG/3ML; MG/3ML
SOLUTION RESPIRATORY (INHALATION)
Qty: 360 ML | Refills: 0 | Status: SHIPPED | OUTPATIENT
Start: 2020-04-27 | End: 2020-05-28

## 2020-05-21 ENCOUNTER — OFFICE VISIT (OUTPATIENT)
Dept: FAMILY MEDICINE CLINIC | Facility: CLINIC | Age: 65
End: 2020-05-21

## 2020-05-21 DIAGNOSIS — J44.9 COPD, SEVERE (HCC): ICD-10-CM

## 2020-05-21 DIAGNOSIS — J44.0 ACUTE BRONCHITIS WITH COPD (HCC): Primary | ICD-10-CM

## 2020-05-21 DIAGNOSIS — F17.200 TOBACCO DEPENDENCE SYNDROME: ICD-10-CM

## 2020-05-21 DIAGNOSIS — J20.9 ACUTE BRONCHITIS WITH COPD (HCC): Primary | ICD-10-CM

## 2020-05-21 PROCEDURE — 99213 OFFICE O/P EST LOW 20 MIN: CPT | Performed by: PHYSICIAN ASSISTANT

## 2020-05-21 RX ORDER — LEVOFLOXACIN 500 MG/1
500 TABLET, FILM COATED ORAL DAILY
Qty: 7 TABLET | Refills: 0 | Status: SHIPPED | OUTPATIENT
Start: 2020-05-21 | End: 2020-08-10

## 2020-05-21 RX ORDER — METHYLPREDNISOLONE 4 MG/1
TABLET ORAL
Qty: 1 EACH | Refills: 0 | Status: SHIPPED | OUTPATIENT
Start: 2020-05-21 | End: 2020-08-10

## 2020-05-28 DIAGNOSIS — J44.9 COPD, SEVERE (HCC): ICD-10-CM

## 2020-05-28 DIAGNOSIS — J44.9 CHRONIC BRONCHITIS WITH COPD (CHRONIC OBSTRUCTIVE PULMONARY DISEASE) (HCC): ICD-10-CM

## 2020-05-28 RX ORDER — IPRATROPIUM BROMIDE AND ALBUTEROL SULFATE 2.5; .5 MG/3ML; MG/3ML
SOLUTION RESPIRATORY (INHALATION)
Qty: 360 ML | Refills: 0 | Status: SHIPPED | OUTPATIENT
Start: 2020-05-28 | End: 2020-07-08

## 2020-05-29 ENCOUNTER — OFFICE VISIT (OUTPATIENT)
Dept: PULMONOLOGY | Facility: CLINIC | Age: 65
End: 2020-05-29

## 2020-05-29 VITALS
HEART RATE: 77 BPM | SYSTOLIC BLOOD PRESSURE: 120 MMHG | OXYGEN SATURATION: 97 % | HEIGHT: 69 IN | WEIGHT: 147.2 LBS | DIASTOLIC BLOOD PRESSURE: 70 MMHG | BODY MASS INDEX: 21.8 KG/M2

## 2020-05-29 DIAGNOSIS — J43.1 PANLOBULAR EMPHYSEMA (HCC): Primary | ICD-10-CM

## 2020-05-29 DIAGNOSIS — J44.1 CHRONIC OBSTRUCTIVE PULMONARY DISEASE WITH ACUTE EXACERBATION (HCC): ICD-10-CM

## 2020-05-29 PROCEDURE — 96372 THER/PROPH/DIAG INJ SC/IM: CPT | Performed by: INTERNAL MEDICINE

## 2020-05-29 PROCEDURE — 99214 OFFICE O/P EST MOD 30 MIN: CPT | Performed by: INTERNAL MEDICINE

## 2020-05-29 RX ORDER — METHYLPREDNISOLONE ACETATE 40 MG/ML
80 INJECTION, SUSPENSION INTRA-ARTICULAR; INTRALESIONAL; INTRAMUSCULAR; SOFT TISSUE ONCE
Status: COMPLETED | OUTPATIENT
Start: 2020-05-29 | End: 2020-05-29

## 2020-05-29 RX ORDER — PREDNISONE 10 MG/1
TABLET ORAL
Qty: 21 TABLET | Refills: 0 | Status: SHIPPED | OUTPATIENT
Start: 2020-05-29 | End: 2020-08-10

## 2020-05-29 RX ORDER — AMOXICILLIN 500 MG/1
CAPSULE ORAL
Qty: 30 CAPSULE | Refills: 0 | Status: SHIPPED | OUTPATIENT
Start: 2020-05-29 | End: 2020-08-10

## 2020-05-29 RX ADMIN — METHYLPREDNISOLONE ACETATE 80 MG: 40 INJECTION, SUSPENSION INTRA-ARTICULAR; INTRALESIONAL; INTRAMUSCULAR; SOFT TISSUE at 10:50

## 2020-05-29 NOTE — PROGRESS NOTES
"This gentleman has COPD with chronic hypoxemic and hypercarbic respiratory failure.  He complains of cough purulent sputum and dyspnea on exertion.  He denies chest pain or hemoptysis      The following portions of the patient's history were reviewed and updated as appropriate: current medications, past family history, past medical history, past social history, past surgical history and problem list.        ROS    Constitutional-no night sweats weight loss headaches  GI no abdominal pain nausea or diarrhea  Neuro no seizure or neurologic deficits  Musculoskeletal no deformity or joint pain   no dysuria or hematuria  Skin no rash or other lesions  All other systems reviewed and were negative except for the above.      Physical Exam  /70   Pulse 77   Ht 175.3 cm (69\")   Wt 66.8 kg (147 lb 3.2 oz)   SpO2 97%   BMI 21.74 kg/m²   Vital signs as above  Pupils equally round and reactive to light and accommodation, neck no JVD or adenopathy.  Cardiovascular regular rhythm and rate no murmur or gallop.  Abdomen soft no organomegaly tenderness.  Extremities no clubbing cyanosis or edema.  No cervical adenopathy.  No skin rash.  Neurologic good strength bilaterally without deficits  Chronically ill appearing dyspneic white male on oxygen lungs reveal diminished breath sounds with scattered wheezes    Impression COPD exacerbation, chronic respiratory failure    Plan Depo-Medrol, prednisone, amoxicillin, continue breathing medications, return in 2 weeks        This document has been produced with the assistance of Dragon dictation  This document has been electronically signed by Aba Lee MD on May 29, 2020 10:46      "

## 2020-06-12 ENCOUNTER — OFFICE VISIT (OUTPATIENT)
Dept: PULMONOLOGY | Facility: CLINIC | Age: 65
End: 2020-06-12

## 2020-06-12 VITALS
OXYGEN SATURATION: 95 % | BODY MASS INDEX: 20.88 KG/M2 | SYSTOLIC BLOOD PRESSURE: 120 MMHG | HEART RATE: 93 BPM | WEIGHT: 141 LBS | HEIGHT: 69 IN | DIASTOLIC BLOOD PRESSURE: 65 MMHG

## 2020-06-12 DIAGNOSIS — J43.1 PANLOBULAR EMPHYSEMA (HCC): Primary | ICD-10-CM

## 2020-06-12 PROCEDURE — 99213 OFFICE O/P EST LOW 20 MIN: CPT | Performed by: INTERNAL MEDICINE

## 2020-06-12 RX ORDER — PREDNISONE 20 MG/1
20 TABLET ORAL DAILY
Qty: 30 TABLET | Refills: 0 | Status: SHIPPED | OUTPATIENT
Start: 2020-06-12 | End: 2020-08-10

## 2020-06-12 NOTE — PROGRESS NOTES
"This gentleman has emphysema chronic respiratory failure with frequent exacerbations.  He had an exacerbation recently and is still short of breath.  He is not coughing purulent material    ROS    Constitutional-no night sweats weight loss headaches  GI no abdominal pain nausea or diarrhea  Neuro no seizure or neurologic deficits  Musculoskeletal no deformity or joint pain   no dysuria or hematuria  Skin no rash or other lesions  All other systems reviewed and were negative except for the above.      Physical Exam  /65   Pulse 93   Ht 175.3 cm (69\")   Wt 64 kg (141 lb)   SpO2 95%   BMI 20.82 kg/m²   Vital signs as above  Pupils equally round and reactive to light and accommodation, neck no JVD or adenopathy.  Cardiovascular regular rhythm and rate no murmur or gallop.  Abdomen soft no organomegaly tenderness.  Extremities no clubbing cyanosis or edema.  No cervical adenopathy.  No skin rash.  Neurologic good strength bilaterally without deficits  Lungs reveal diminished breath sounds and no wheeze    Impression COPD with hypoxemia with recurring exacerbations    Plan refrain from smoking, will have prednisone 20 mg to use as needed, return in 1 month        This document has been produced with the assistance of Dragon dictation  This document has been electronically signed by Aba Lee MD on June 12, 2020 11:14      "

## 2020-07-08 DIAGNOSIS — J44.9 COPD, SEVERE (HCC): ICD-10-CM

## 2020-07-08 DIAGNOSIS — J44.9 CHRONIC BRONCHITIS WITH COPD (CHRONIC OBSTRUCTIVE PULMONARY DISEASE) (HCC): ICD-10-CM

## 2020-07-08 RX ORDER — IPRATROPIUM BROMIDE AND ALBUTEROL SULFATE 2.5; .5 MG/3ML; MG/3ML
SOLUTION RESPIRATORY (INHALATION)
Qty: 360 ML | Refills: 5 | Status: SHIPPED | OUTPATIENT
Start: 2020-07-08 | End: 2020-12-31 | Stop reason: SDUPTHER

## 2020-07-23 DIAGNOSIS — Z99.81 REQUIRES SUPPLEMENTAL OXYGEN: ICD-10-CM

## 2020-07-23 DIAGNOSIS — J96.12 CHRONIC RESPIRATORY FAILURE WITH HYPERCAPNIA (HCC): ICD-10-CM

## 2020-07-23 DIAGNOSIS — J44.9 COPD, SEVERE (HCC): Primary | ICD-10-CM

## 2020-08-10 ENCOUNTER — OFFICE VISIT (OUTPATIENT)
Dept: FAMILY MEDICINE CLINIC | Facility: CLINIC | Age: 65
End: 2020-08-10

## 2020-08-10 ENCOUNTER — RESULTS ENCOUNTER (OUTPATIENT)
Dept: FAMILY MEDICINE CLINIC | Facility: CLINIC | Age: 65
End: 2020-08-10

## 2020-08-10 DIAGNOSIS — Z99.81 REQUIRES SUPPLEMENTAL OXYGEN: ICD-10-CM

## 2020-08-10 DIAGNOSIS — J96.12 CHRONIC RESPIRATORY FAILURE WITH HYPERCAPNIA (HCC): ICD-10-CM

## 2020-08-10 DIAGNOSIS — F17.200 TOBACCO DEPENDENCE SYNDROME: ICD-10-CM

## 2020-08-10 DIAGNOSIS — J44.9 COPD, SEVERE (HCC): ICD-10-CM

## 2020-08-10 DIAGNOSIS — Z09 HOSPITAL DISCHARGE FOLLOW-UP: ICD-10-CM

## 2020-08-10 DIAGNOSIS — J44.1 ACUTE EXACERBATION OF CHRONIC OBSTRUCTIVE PULMONARY DISEASE (COPD) (HCC): Primary | ICD-10-CM

## 2020-08-10 DIAGNOSIS — J44.1 ACUTE EXACERBATION OF CHRONIC OBSTRUCTIVE PULMONARY DISEASE (COPD) (HCC): ICD-10-CM

## 2020-08-10 PROCEDURE — 99442 PR PHYS/QHP TELEPHONE EVALUATION 11-20 MIN: CPT | Performed by: PHYSICIAN ASSISTANT

## 2020-08-10 RX ORDER — LEVOFLOXACIN 500 MG/1
500 TABLET, FILM COATED ORAL DAILY
Qty: 7 TABLET | Refills: 0 | Status: SHIPPED | OUTPATIENT
Start: 2020-08-10 | End: 2020-10-09

## 2020-08-10 RX ORDER — PREDNISONE 20 MG/1
TABLET ORAL
Qty: 37 TABLET | Refills: 0 | Status: SHIPPED | OUTPATIENT
Start: 2020-08-10 | End: 2020-10-09

## 2020-08-10 NOTE — PROGRESS NOTES
Subjective   Kermit Corley is a 65 y.o. male.     You have chosen to receive care through a telephone visit. Do you consent to use a telephone visit for your medical care today? Yes This visit has been rescheduled as a phone visit to comply with patient safety concerns in accordance with CDC recommendations. Total time of discussion was 20 minutes.       History of Present Illness     Pt presents today via telephone encounter for a f/u on acute copd exacerbation and hospital admission. Admission date: 7/20/20. Discharge date: 7/26/20. Reports he was having dyspnea, productive cough of purulent sputum. Admission dx: acute copd exacerbation, pleuritic chest pain. Discharge dx: AE copd, acute on chronic hypoxemic hypercapneic respiratory failure, chest pain 2/2 cough. Reports he was treated with levaquin at the hospital and iv steroids (solu-medrol). He had a sputum culture performed which grew pseudomonas aeruginosa. He was discharged with an additional 5 days of levaquin and medrol dose john.    Pt reports he has since completed levaquin and medrol dose john with minimal improvement over a week ago. Admits to productive cough of purulent sputum, dyspnea worse than baseline, pleuritic chest tightness. Denies fever, chills, nausea, vomiting. Reports he has been using bronchodilators as prescribed as well as supplemental oxygen 2.5L daily (increased from 2L daily).  Reports utilizing mucinex at home with minimal improvement.     The following portions of the patient's history were reviewed and updated as appropriate: allergies, current medications, past family history, past medical history, past social history, past surgical history and problem list.    Review of Systems   Constitutional: Positive for activity change and fatigue. Negative for appetite change, chills, diaphoresis, fever, unexpected weight gain and unexpected weight loss.   HENT: Positive for congestion, postnasal drip, rhinorrhea, sneezing and  sore throat. Negative for dental problem, drooling, ear discharge, ear pain, facial swelling, hearing loss, mouth sores, nosebleeds, sinus pressure, tinnitus, trouble swallowing and voice change.    Eyes: Negative for blurred vision, double vision, pain and visual disturbance.   Respiratory: Positive for cough, chest tightness, shortness of breath and wheezing. Negative for apnea, choking and stridor.    Cardiovascular: Negative for chest pain, palpitations and leg swelling.   Gastrointestinal: Negative for abdominal distention, abdominal pain, constipation, diarrhea, nausea, vomiting, GERD and indigestion.   Endocrine: Negative.    Genitourinary: Negative for dysuria.   Musculoskeletal: Negative for arthralgias, myalgias and neck pain.   Skin: Negative for rash.   Neurological: Positive for weakness and headache. Negative for dizziness, light-headedness and confusion.   Psychiatric/Behavioral: Negative.        Objective   Physical Exam   Constitutional: He is oriented to person, place, and time. No distress.   Cardiovascular: Normal pulse (patient directed exam).      Pulmonary/Chest: No stridor.  No respiratory distress.Use of oxygen by  nasal cannula noted. He Audible wheeze noted...He exhibits no tenderness.   Abdominal: Soft. He exhibits no distension. There is no tenderness.   Musculoskeletal:         General: No edema.   Neurological: He is alert and oriented to person, place, and time.   Skin: Skin is warm and dry. No rash noted. He is not diaphoretic. No erythema. No pallor.   Psychiatric: He has a normal mood and affect.      PE limited d/t telehealth encounter.     Assessment/Plan   Diagnoses and all orders for this visit:    Acute exacerbation of chronic obstructive pulmonary disease (COPD) (CMS/[facility])  -     XR Chest 2 View; Future  -     COVID-19,[facility], NP SWAB IN TRANSPORT MEDIA 48-72 HR TAT - Swab, Nasopharynx; Future  -     predniSONE (DELTASONE) 20 MG tablet; Start 8/10/20 Take 4  tablets PO daily and wean 1/2 tablet every other day  -     Respiratory Culture - Sputum, Cough; Future  -     levoFLOXacin (LEVAQUIN) 500 MG tablet; Take 1 tablet by mouth Daily.    COPD, severe (CMS/HCC)    Requires supplemental oxygen    Chronic respiratory failure with hypercapnia (CMS/HCC)    Tobacco dependence syndrome    Hospital discharge follow-up      Acute copd exacerbation - Pt verbalizes sx are comparable with previous copd exacerbations in the past. Covid-19 testing faxed to [facility] will notify pt of results when received. Advised to self-isolate until results returned. Begin levaquin and prednisone taper as above. Continue otc mucinex as directed.  Advised to continue bronchodilators as prescribed. Continue 2.5L O2 continuous oxygen supplementation.  Advised to take tylenol otc q 4-6 hours prn for mild pain/fever. Advised pt to maintain good I&O & maintain good hand hygiene. Discussed sleeping propped up for improvement in dyspnea. Discussed compliance with wearing supplemental oxygen. Discussed concerning s/s to immediately rtc for recheck or go to ER for evaluation and tx including but not limited to chest pain, worsening dyspnea, hemoptysis, fever, chills, decreased I&O, AMS. Pt verbalized understanding.     Repeat cxr to r/o interval development of pneumonia. Repeat sputum culture to evaluate for clearance of previous pseudomonas infection. Discussed black box warnings of fluoroquinolones including increased risk for tendinitis/tendon rupture, peripheral neuropathy, & potential side effects of medication in office with pt. Pt verbalized understanding and is agreeable to plan of care.    COPD, chronic respiratory failure with hypercapnia - managed with albuterol, advair, singulair, incruse ellipta, 2L continuous supplemental oxygen. Was previously followed by Dr. Lee, pulmonology, who has recently retired. Awaiting appt with pulmonology at [employer].     Tobacco dependence due to  cigarettes - decreased to 1/2ppd x 1 week. Hx of 1ppd x 45 years. Congratulated on cutting down on tobacco use. Declined interest in tobacco cessation. I advised Kermit of the risks of continuing to use tobacco.       Patient educated to follow up in 1 week or sooner than next scheduled appointment if symptoms worsen or do not improve. Patient stated understanding and has agreed with plan of care. After visit summary was printed and given to patient.      This document has been electronically signed by Anjali Reyes PA-C on August 10, 2020 12:10,.

## 2020-08-17 ENCOUNTER — OFFICE VISIT (OUTPATIENT)
Dept: FAMILY MEDICINE CLINIC | Facility: CLINIC | Age: 65
End: 2020-08-17

## 2020-08-17 DIAGNOSIS — F17.200 TOBACCO DEPENDENCE SYNDROME: ICD-10-CM

## 2020-08-17 DIAGNOSIS — J44.9 COPD, SEVERE (HCC): ICD-10-CM

## 2020-08-17 DIAGNOSIS — Z00.00 MEDICARE ANNUAL WELLNESS VISIT, SUBSEQUENT: ICD-10-CM

## 2020-08-17 DIAGNOSIS — J44.1 ACUTE EXACERBATION OF CHRONIC OBSTRUCTIVE PULMONARY DISEASE (COPD) (HCC): Primary | ICD-10-CM

## 2020-08-17 DIAGNOSIS — J96.12 CHRONIC RESPIRATORY FAILURE WITH HYPERCAPNIA (HCC): ICD-10-CM

## 2020-08-17 DIAGNOSIS — Z99.81 REQUIRES SUPPLEMENTAL OXYGEN: ICD-10-CM

## 2020-08-17 DIAGNOSIS — B37.0 THRUSH: ICD-10-CM

## 2020-08-17 PROCEDURE — 99442 PR PHYS/QHP TELEPHONE EVALUATION 11-20 MIN: CPT | Performed by: PHYSICIAN ASSISTANT

## 2020-08-17 NOTE — PROGRESS NOTES
The ABCs of the Annual Wellness Visit  Subsequent Medicare Wellness Visit    No chief complaint on file.    You have chosen to receive care through a telephone visit. Do you consent to use a telephone visit for your medical care today? Yes This visit has been rescheduled as a phone visit to comply with patient safety concerns in accordance with CDC recommendations. Total time of discussion was 20 minutes.       Subjective   History of Present Illness:  Kermit Corley is a 65 y.o. male who presents for a Subsequent Medicare Wellness Visit via telephone encounter.    HEALTH RISK ASSESSMENT    Recent Hospitalizations:  Recently treated at the following:  Other: [facility]    Current Medical Providers:  Patient Care Team:  Anjali Reyes PA-C as PCP - General (Family Medicine)    Smoking Status:  Social History     Tobacco Use   Smoking Status Heavy Tobacco Smoker   • Packs/day: 0.50   • Years: 30.00   • Pack years: 15.00   • Types: Cigarettes   • Start date: 1/1/1967   Smokeless Tobacco Never Used   Tobacco Comment    2 ppd for 30 years       Alcohol Consumption:  Social History     Substance and Sexual Activity   Alcohol Use No       Depression Screen:   PHQ-2/PHQ-9 Depression Screening 8/17/2020   Little interest or pleasure in doing things 0   Feeling down, depressed, or hopeless 0   Trouble falling or staying asleep, or sleeping too much 0   Feeling tired or having little energy 0   Poor appetite or overeating 0   Feeling bad about yourself - or that you are a failure or have let yourself or your family down 0   Trouble concentrating on things, such as reading the newspaper or watching television 0   Moving or speaking so slowly that other people could have noticed. Or the opposite - being so fidgety or restless that you have been moving around a lot more than usual 0   Thoughts that you would be better off dead, or of hurting yourself in some way 0   Total Score 0   If you checked off any problems, how  difficult have these problems made it for you to do your work, take care of things at home, or get along with other people? -       Fall Risk Screen:  TSERING Fall Risk Assessment was completed, and patient is at LOW risk for falls.Assessment completed on:8/17/2020    Health Habits and Functional and Cognitive Screening:  Functional & Cognitive Status 8/17/2020   Do you have difficulty preparing food and eating? No   Do you have difficulty bathing yourself, getting dressed or grooming yourself? No   Do you have difficulty using the toilet? No   Do you have difficulty moving around from place to place? No   Do you have trouble with steps or getting out of a bed or a chair? No   Current Diet Well Balanced Diet   Dental Exam Not up to date   Eye Exam Up to date   Exercise (times per week) 2 times per week   Current Exercise Activities Include Walking   Do you need help using the phone?  No   Are you deaf or do you have serious difficulty hearing?  No   Do you need help with transportation? No   Do you need help shopping? No   Do you need help preparing meals?  No   Do you need help with housework?  No   Do you need help with laundry? No   Do you need help taking your medications? No   Do you need help managing money? No   Do you ever drive or ride in a car without wearing a seat belt? No   Have you felt unusual stress, anger or loneliness in the last month? No   Who do you live with? Alone   If you need help, do you have trouble finding someone available to you? No   Have you been bothered in the last four weeks by sexual problems? No   Do you have difficulty concentrating, remembering or making decisions? No         Does the patient have evidence of cognitive impairment? No    Asprin use counseling:Taking ASA appropriately as indicated    Age-appropriate Screening Schedule:  Refer to the list below for future screening recommendations based on patient's age, sex and/or medical conditions. Orders for these recommended  tests are listed in the plan section. The patient has been provided with a written plan.    Health Maintenance   Topic Date Due   • ZOSTER VACCINE (1 of 2) 04/08/2003   • TDAP/TD VACCINES (2 - Td) 03/28/2016   • INFLUENZA VACCINE  08/01/2020   • COLONOSCOPY  10/26/2022          The following portions of the patient's history were reviewed and updated as appropriate: allergies, current medications, past family history, past medical history, past social history, past surgical history and problem list.    Outpatient Medications Prior to Visit   Medication Sig Dispense Refill   • albuterol sulfate  (90 Base) MCG/ACT inhaler Inhale 2 puffs Every 6 (Six) Hours As Needed for Wheezing or Shortness of Air. 1 inhaler 11   • amitriptyline (ELAVIL) 100 MG tablet Take 1 tablet by mouth Every Night. 90 tablet 1   • aspirin 81 MG EC tablet Take 81 mg by mouth Daily.     • atorvastatin (LIPITOR) 10 MG tablet Take 1 tablet by mouth Daily. 90 tablet 2   • fluticasone-salmeterol (Advair Diskus) 500-50 MCG/DOSE DISKUS Inhale 1 puff 2 (Two) Times a Day. 60 each 5   • furosemide (Lasix) 40 MG tablet Take 1 tablet by mouth Daily As Needed (peripheral edema). 30 tablet 0   • guaiFENesin ER (MUCINEX MAXIMUM STRENGTH) 1200 MG tablet sustained-release 12 hour Take 1,200 mg by mouth 2 (Two) Times a Day. 60 each 0   • ipratropium-albuterol (DUO-NEB) 0.5-2.5 mg/3 ml nebulizer USE 1 AMPULE IN NEBULIZER 4 TIMES DAILY AS NEEDED 360 mL 5   • levoFLOXacin (LEVAQUIN) 500 MG tablet Take 1 tablet by mouth Daily. 7 tablet 0   • montelukast (SINGULAIR) 10 MG tablet Take 1 tablet by mouth Every Night. 30 tablet 5   • mupirocin (BACTROBAN) 2 % ointment Apply  topically to the appropriate area as directed 2 (Two) Times a Day. Apply to right foot 15 g 1   • nebivolol (BYSTOLIC) 2.5 MG tablet Take 2.5 mg by mouth Daily.     • nitroglycerin (NITROSTAT) 0.4 MG SL tablet Place 0.4 mg under the tongue. 1 tablet under the tongue every 5 (five) minutes as  needed for Chest pain     • potassium chloride (K-DUR,KLOR-CON) 20 MEQ CR tablet Take 1 tablet PO with lasix daily prn. 30 tablet 0   • predniSONE (DELTASONE) 20 MG tablet Start 8/10/20 Take 4 tablets PO daily and wean 1/2 tablet every other day 37 tablet 0   • tamsulosin (FLOMAX) 0.4 MG capsule 24 hr capsule Take 1 capsule by mouth Every Night. 90 capsule 1   • Umeclidinium Bromide (INCRUSE ELLIPTA) 62.5 MCG/INH aerosol powder  Inhale 1 puff Daily. 1 each 5     No facility-administered medications prior to visit.        Patient Active Problem List   Diagnosis   • Tobacco dependence syndrome   • Insomnia   • Bilateral edema of lower extremity   • COPD, severe (CMS/HCC)   • Asthma   • Chronic bronchitis with COPD (chronic obstructive pulmonary disease) (CMS/HCC)   • Chronic respiratory failure with hypercapnia (CMS/HCC)   • Hernia of abdominal cavity   • Hypomagnesemia   • Pulmonary nodule   • COPD (chronic obstructive pulmonary disease) (CMS/HCC)   • Asthma-chronic obstructive pulmonary disease overlap syndrome (CMS/HCC)   • Anxiety   • Grief   • Hypokalemia   • Requires supplemental oxygen       Advanced Care Planning:  ACP discussion was held with the patient during this visit. Patient has an advance directive (not in EMR), copy requested.    Review of Systems   Constitutional: Positive for fatigue (chronic). Negative for activity change, appetite change, chills, diaphoresis, fever and unexpected weight change.   HENT: Negative.    Eyes: Negative for pain and visual disturbance.   Respiratory: Positive for cough (chronic), shortness of breath (chronic, baseline) and wheezing (chronic, baseline). Negative for apnea, choking, chest tightness and stridor.    Cardiovascular: Negative for chest pain, palpitations and leg swelling.   Gastrointestinal: Negative for abdominal distention, abdominal pain, constipation, diarrhea, nausea and vomiting.   Endocrine: Negative.    Genitourinary: Negative.    Musculoskeletal:  Positive for arthralgias and gait problem (ambulates with cane). Negative for back pain, joint swelling, myalgias, neck pain and neck stiffness.   Skin: Negative.    Psychiatric/Behavioral: Negative.        Compared to one year ago, the patient feels his physical health is worse.  Compared to one year ago, the patient feels his mental health is the same.    Reviewed chart for potential of high risk medication in the elderly: yes  Reviewed chart for potential of harmful drug interactions in the elderly:yes    Objective       Discussed the patient's BMI with him. The BMI is in the acceptable range.    Physical Exam   Constitutional: He is oriented to person, place, and time. No distress.   Cardiovascular: Normal pulse (patient directed exam).      Pulmonary/Chest: No stridor.  No respiratory distress.Use of oxygen by  nasal cannula noted. He no audible wheeze...He exhibits no tenderness.   Abdominal: Soft. He exhibits no distension. There is no tenderness.   Musculoskeletal:         General: No edema.   Lymphadenopathy:     He has no cervical adenopathy.   Neurological: He is alert and oriented to person, place, and time.   Skin: Skin is warm and dry. No rash noted. He is not diaphoretic. No erythema. No pallor.   Psychiatric: He has a normal mood and affect.      PE limited d/t telehealth encounter.        Assessment/Plan   Medicare Risks and Personalized Health Plan  CMS Preventative Services Quick Reference  Advance Directive Discussion  Cardiovascular risk  Chronic Pain   Colon Cancer Screening  Dementia/Memory   Depression/Dysphoria  Diabetic Lab Screening   Fall Risk  Immunizations Discussed/Encouraged (specific immunizations; Td, Influenza, Pneumococcal 23, Prevnar and Shingrix )  Lung Cancer Risk  Prostate Cancer Screening   Tobacco Use/Dependance (use dotphrase .tobaccocessation for documentation)    The above risks/problems have been discussed with the patient.  Pertinent information has been shared with the  patient in the After Visit Summary.  Follow up plans and orders are seen below in the Assessment/Plan Section.    Diagnoses and all orders for this visit:    1. Acute exacerbation of chronic obstructive pulmonary disease (COPD) (CMS/[facility]) (Primary)    2. Thrush  -     nystatin (MYCOSTATIN) 817651 UNIT/ML suspension; Swish and swallow 5 mL 4 (Four) Times a Day for 10 days. May use an additional four days if needed.  Dispense: 473 mL; Refill: 0    3. Tobacco dependence syndrome    4. COPD, severe (CMS/[facility])    5. Requires supplemental oxygen    6. Chronic respiratory failure with hypercapnia (CMS/[facility])    7. Medicare annual wellness visit, subsequent      Medicare wellness visit completed.     Advance care planning discussion held. Pt reports he has completed an advanced directive. Asked for him to bring a copy to clinic for us to have on file.   Colon cancer screening utd.  PHQ screening negative.   LDCT lung cancer screen utd.  Encouraged to complete baseline labs as ordered at previous encounter for evaluation of chronic conditions. Will notify pt of results when received.   Encouraged tobacco cessation. Declined per pt. Encouraged gradual taper to d/c use of cigarettes, pt declined.        Follow Up:  No follow-ups on file.     An After Visit Summary and PPPS were given to the patient.

## 2020-08-17 NOTE — PROGRESS NOTES
Subjective   Kermit Corley is a 65 y.o. male.     You have chosen to receive care through a telephone visit. Do you consent to use a telephone visit for your medical care today? Yes This visit has been rescheduled as a phone visit to comply with patient safety concerns in accordance with CDC recommendations. Total time of discussion was 20 minutes.    History of Present Illness     Pt presents today via telephone encounter for a 1 week f/u on acute copd exacerbation treated with levaquin and prednisone taper. Reports he has since completed levaquin (last day today). Reports significant improvement in dyspnea, chest tightness. Reports occasional cough with clear sputum. Denies chest pain, fever, chills, nausea, vomiting. Reports good appetite. Reports he has been using bronchodilators as prescribed as well as supplemental oxygen 2L daily. Reports using mucinex otc with significant improvement. CXR 8/12/20 noted for moderate/severe copd changes without old congestion or infiltrate. Covid testing negative 8/11/20. Reports he continues to smoke 1/2 ppd, hx of 1ppd x 45 years.     Admits to white plaque to tongue x the past 3 days. Reports mild tenderness to tongue with eating. Reports he has similar sx in the past with thrush and requesting treatment for this today. Denies dysphagia.    The following portions of the patient's history were reviewed and updated as appropriate: allergies, current medications, past family history, past medical history, past social history, past surgical history and problem list.    Review of Systems   Constitutional: Positive for fatigue. Negative for activity change, appetite change, chills, diaphoresis, fever, unexpected weight gain and unexpected weight loss.   HENT: Positive for congestion. Negative for dental problem, drooling, ear discharge, ear pain, postnasal drip, rhinorrhea, sinus pressure, sneezing, sore throat, swollen glands, tinnitus, trouble swallowing and voice  change.    Eyes: Negative for blurred vision, double vision and visual disturbance.   Respiratory: Positive for cough (chronic), shortness of breath (baseline) and wheezing (baseline). Negative for apnea, choking, chest tightness and stridor.    Cardiovascular: Negative for chest pain, palpitations and leg swelling.   Gastrointestinal: Negative for abdominal distention, abdominal pain, constipation, diarrhea, nausea, vomiting, GERD and indigestion.   Musculoskeletal: Negative for myalgias, neck pain and neck stiffness.   Skin: Negative.  Negative for rash.   Neurological: Negative for dizziness, weakness, light-headedness, headache and confusion.   Psychiatric/Behavioral: Negative.        Objective   Physical Exam   Constitutional: He is oriented to person, place, and time. No distress.   Cardiovascular: Normal pulse (patient directed exam).      Pulmonary/Chest: No stridor.  No respiratory distress.Use of oxygen by  nasal cannula noted. He no audible wheeze...He exhibits no tenderness.   Abdominal: Soft. He exhibits no distension. There is no tenderness.   Musculoskeletal:         General: No edema.   Lymphadenopathy:     He has no cervical adenopathy.   Neurological: He is alert and oriented to person, place, and time.   Skin: Skin is warm and dry. No rash noted. He is not diaphoretic. No erythema. No pallor.   Psychiatric: He has a normal mood and affect.      PE limited d/t telehealth encounter.    Assessment/Plan   Diagnoses and all orders for this visit:    Acute exacerbation of chronic obstructive pulmonary disease (COPD) (CMS/HCC)    Thrush  -     nystatin (MYCOSTATIN) 544160 UNIT/ML suspension; Swish and swallow 5 mL 4 (Four) Times a Day for 10 days. May use an additional four days if needed.    Tobacco dependence syndrome    COPD, severe (CMS/HCC)    Requires supplemental oxygen    Chronic respiratory failure with hypercapnia (CMS/[facility])      Acute copd exacerbation - resolved, Continue prednisone as  prescribed until completion. . Advised to continue bronchodilators as prescribed. Continue 2L O2 continuous oxygen supplementation. Continue mucinex otc as directed. Advised to alternate tylenol/ibuprofen otc q 4-6 hours prn for mild pain/fever. CXR 8/12/20 noted for moderate/severe copd changes without old congestion or infiltrate. Covid testing negative 8/11/20.    Tobacco dependence syndrome - 1/2 ppd, hx of 1ppd x 45 years. Advised pt to quit smoking. I advised Kermit of the risks of continuing to use tobacco. During this visit, I spent <3 minutes counseling the patient regarding tobacco cessation. Pt declines interest in tobacco cessation. Encouraged gradual taper to discontinuation of cigarettes.     Thrush - new. Begin nystatin suspension as above. Discussed importance of rinsing mouth with water after inhaler use.     Medicare annual wellness visit completed.     Patient educated to follow up sooner than next scheduled appointment if needed. Patient stated understanding and has agreed with plan of care. After visit summary was printed and given to patient.      This document has been electronically signed by Anjali Reyes PA-C on August 17, 2020 12:05,.

## 2020-08-19 DIAGNOSIS — J44.1 ACUTE EXACERBATION OF CHRONIC OBSTRUCTIVE PULMONARY DISEASE (COPD) (HCC): ICD-10-CM

## 2020-10-09 ENCOUNTER — OFFICE VISIT (OUTPATIENT)
Dept: FAMILY MEDICINE CLINIC | Facility: CLINIC | Age: 65
End: 2020-10-09

## 2020-10-09 DIAGNOSIS — Z91.89 CANDIDATE FOR STATIN THERAPY DUE TO RISK OF FUTURE CARDIOVASCULAR EVENT: ICD-10-CM

## 2020-10-09 DIAGNOSIS — F51.01 PRIMARY INSOMNIA: ICD-10-CM

## 2020-10-09 DIAGNOSIS — E78.00 ELEVATED LDL CHOLESTEROL LEVEL: ICD-10-CM

## 2020-10-09 DIAGNOSIS — F17.200 TOBACCO DEPENDENCE SYNDROME: ICD-10-CM

## 2020-10-09 DIAGNOSIS — J44.9 CHRONIC BRONCHITIS WITH COPD (CHRONIC OBSTRUCTIVE PULMONARY DISEASE) (HCC): ICD-10-CM

## 2020-10-09 DIAGNOSIS — J44.1 COPD WITH ACUTE EXACERBATION (HCC): ICD-10-CM

## 2020-10-09 DIAGNOSIS — R60.0 BILATERAL EDEMA OF LOWER EXTREMITY: ICD-10-CM

## 2020-10-09 DIAGNOSIS — R35.0 BENIGN PROSTATIC HYPERPLASIA WITH URINARY FREQUENCY: ICD-10-CM

## 2020-10-09 DIAGNOSIS — Z99.81 REQUIRES SUPPLEMENTAL OXYGEN: ICD-10-CM

## 2020-10-09 DIAGNOSIS — N40.1 BENIGN PROSTATIC HYPERPLASIA WITH URINARY FREQUENCY: ICD-10-CM

## 2020-10-09 DIAGNOSIS — J44.9 COPD, SEVERE (HCC): Primary | ICD-10-CM

## 2020-10-09 DIAGNOSIS — J96.12 CHRONIC RESPIRATORY FAILURE WITH HYPERCAPNIA (HCC): ICD-10-CM

## 2020-10-09 PROCEDURE — 99442 PR PHYS/QHP TELEPHONE EVALUATION 11-20 MIN: CPT | Performed by: PHYSICIAN ASSISTANT

## 2020-10-09 RX ORDER — METHYLPREDNISOLONE 4 MG/1
TABLET ORAL
Qty: 1 EACH | Refills: 0 | Status: SHIPPED | OUTPATIENT
Start: 2020-10-09 | End: 2020-10-21

## 2020-10-09 RX ORDER — ATORVASTATIN CALCIUM 10 MG/1
10 TABLET, FILM COATED ORAL DAILY
Qty: 90 TABLET | Refills: 1 | Status: SHIPPED | OUTPATIENT
Start: 2020-10-09 | End: 2021-05-21

## 2020-10-09 RX ORDER — TAMSULOSIN HYDROCHLORIDE 0.4 MG/1
1 CAPSULE ORAL NIGHTLY
Qty: 90 CAPSULE | Refills: 1 | Status: SHIPPED | OUTPATIENT
Start: 2020-10-09 | End: 2021-08-30 | Stop reason: SDUPTHER

## 2020-10-09 RX ORDER — AMITRIPTYLINE HYDROCHLORIDE 100 MG/1
100 TABLET, FILM COATED ORAL NIGHTLY
Qty: 90 TABLET | Refills: 1 | Status: SHIPPED | OUTPATIENT
Start: 2020-10-09 | End: 2021-05-05

## 2020-10-09 RX ORDER — MONTELUKAST SODIUM 10 MG/1
10 TABLET ORAL NIGHTLY
Qty: 30 TABLET | Refills: 5 | Status: SHIPPED | OUTPATIENT
Start: 2020-10-09 | End: 2021-04-29

## 2020-10-09 RX ORDER — DOXYCYCLINE HYCLATE 100 MG/1
100 CAPSULE ORAL 2 TIMES DAILY
Qty: 20 CAPSULE | Refills: 0 | Status: SHIPPED | OUTPATIENT
Start: 2020-10-09 | End: 2020-10-21

## 2020-10-09 NOTE — PROGRESS NOTES
Subjective   Kermit Corley is a 65 y.o. male.   You have chosen to receive care through a telehealth visit.  Do you consent to use a video/audio connection for your medical care today? Yes This visit has been rescheduled as a phone visit to comply with patient safety concerns in accordance with CDC recommendations. Total time of discussion was 20 minutes.     History of Present Illness     Pt presents today via video encounter for a 3 month f/u on chronic conditions.     Tobacco dependence due to cigarettes - decreased to 1/4 ppd x 1 week. Hx 1ppd x 45 years. Last ct low dose lung cancer screen 5/19/20 - noted for no suspicious nodules.     COPD, chronic respiratory failure - chronic, managed with albuterol, advair, singulair, incruse ellipta, 2L continuous supplemental oxygen. Was previously referred to [place] pulmonology at previous [facility] but was lost to f/u.     Reports acute worsening of copd with flare x 4 days. Reports gradually worsening dyspnea, productive cough of purulent sputum, worsening of wheezing, pleuritic chest tightness.  Pt reports his sx are consistent with previous flare of copd and reports he typically experiences copd flare around this time of year. Pt reports he has been taking otc mucinex with minimal improvement. Denies fever, chills, myalgias. Admits to headache, rhinorrhea, nasal congestion, bilateral ear pressure. Denies known sick contacts. Denies known contact/exposure to covid-19.    Hx tachyarrhythmias, chest pain, htn - followed by Dr. Waite, Cardiology managed with bystolic, daily 81mg ASA, nitrostat prn. Denies chest pain, palpitations, dizziness, lightheadedness, syncope/presyncope.    BPH -well controlled,  managed with flomax. Denies urinary sx.      Insomnia - chronic, well controlled with amitriptyline.      Hyperlipidemia - managed with atorvastatin.     Peripheral edema - chronic, well controlled, managed with lasix daily prn. Reports having to take medication 3-4 days  of the month. Echo performed 3/9/20 noted for normal EF (56-60%), normal lef ventricular wall size, thickness, and contractility. Mild MRV, moderate AVR.     The following portions of the patient's history were reviewed and updated as appropriate: allergies, current medications, past family history, past medical history, past social history, past surgical history and problem list.    Review of Systems   Constitutional: Positive for fatigue. Negative for activity change, appetite change, chills, diaphoresis, fever, unexpected weight gain and unexpected weight loss.   HENT: Positive for congestion, postnasal drip and rhinorrhea. Negative for dental problem, drooling, ear discharge, ear pain, facial swelling, hearing loss, mouth sores, nosebleeds, sinus pressure, sneezing, sore throat, swollen glands, tinnitus, trouble swallowing and voice change.    Eyes: Negative for blurred vision, double vision and visual disturbance.   Respiratory: Positive for cough, chest tightness, shortness of breath and wheezing (worse than baseline). Negative for apnea, choking and stridor.    Cardiovascular: Negative for chest pain, palpitations and leg swelling.   Gastrointestinal: Negative for abdominal distention, abdominal pain, constipation, diarrhea, nausea, vomiting, GERD and indigestion.   Endocrine: Negative.    Genitourinary: Negative for dysuria.   Musculoskeletal: Negative for myalgias and neck pain.   Skin: Negative.  Negative for rash.   Neurological: Negative for dizziness, weakness, headache and confusion.   Psychiatric/Behavioral: Negative.        Objective   Physical Exam   Constitutional: He is oriented to person, place, and time. He appears well-developed and well-nourished. He is active and cooperative. He has a sickly appearance. No distress.   Appears frail   HENT:   Head: Normocephalic and atraumatic.   Right Ear: External ear normal.   Left Ear: External ear normal.   Nose: Rhinorrhea and congestion present. No  mucosal edema. Right sinus exhibits no maxillary sinus tenderness and no frontal sinus tenderness. Left sinus exhibits no maxillary sinus tenderness and no frontal sinus tenderness.   Mouth/Throat: Uvula is midline and mucous membranes are normal. Mucous membranes are not pale, not dry and not cyanotic. Posterior oropharyngeal erythema present. No oropharyngeal exudate, posterior oropharyngeal edema or tonsillar abscesses. Tonsils are 0 on the right. Tonsils are 0 on the left. No tonsillar exudate.   Neck: Normal range of motion. Neck supple.   Pulmonary/Chest: No accessory muscle usage or stridor. No apnea, no tachypnea and no bradypnea. No respiratory distress. He has wheezes.   Supplemental oxygen by nasal cannula in place.   Abdominal: Soft. Bowel sounds are normal. He exhibits no distension. There is no abdominal tenderness.   Musculoskeletal: Normal range of motion.      Right lower leg: No edema.      Left lower leg: No edema.   Lymphadenopathy:     He has no cervical adenopathy.   Neurological: He is alert and oriented to person, place, and time.   Skin: Skin is warm and dry. No rash noted. He is not diaphoretic. No erythema. No pallor.   Psychiatric: His behavior is normal. Mood, judgment and thought content normal.     Physical exam limited due to telehealth encounter.    Assessment/Plan   Diagnoses and all orders for this visit:    COPD, severe (CMS/[facility])  -     fluticasone-salmeterol (Advair Diskus) 500-50 MCG/DOSE DISKUS; Inhale 1 puff 2 (Two) Times a Day.  -     montelukast (SINGULAIR) 10 MG tablet; Take 1 tablet by mouth Every Night.    Chronic bronchitis with COPD (chronic obstructive pulmonary disease) (CMS/[facility])  -     Umeclidinium Bromide (INCRUSE ELLIPTA) 62.5 MCG/INH aerosol powder ; Inhale 1 puff Daily.    Chronic respiratory failure with hypercapnia (CMS/[facility])  -     Umeclidinium Bromide (INCRUSE ELLIPTA) 62.5 MCG/INH aerosol powder ; Inhale 1 puff Daily.    Requires supplemental  oxygen    Tobacco dependence syndrome    Primary insomnia  -     amitriptyline (ELAVIL) 100 MG tablet; Take 1 tablet by mouth Every Night.    Bilateral edema of lower extremity    Candidate for statin therapy due to risk of future cardiovascular event  -     atorvastatin (LIPITOR) 10 MG tablet; Take 1 tablet by mouth Daily.    Elevated LDL cholesterol level  -     atorvastatin (LIPITOR) 10 MG tablet; Take 1 tablet by mouth Daily.    Benign prostatic hyperplasia with urinary frequency  -     tamsulosin (FLOMAX) 0.4 MG capsule 24 hr capsule; Take 1 capsule by mouth Every Night.    COPD with acute exacerbation (CMS/HCC)  -     doxycycline (VIBRAMYCIN) 100 MG capsule; Take 1 capsule by mouth 2 (Two) Times a Day.  -     methylPREDNISolone (MEDROL) 4 MG dose pack; Take as directed on package instructions.      Baseline labs ordered as above for further evaluation and assessment of chronic conditions. Will notify pt of results when received and address appropriately    Acute copd exacerbation - Pt verbalizes sx are comparable with previous copd exacerbations in the past. Begin doxycycline, medrol dose john as above. Continue otc mucinex as directed. Continue bronchodilators as prescribed. Advised pt to maintain good I&O & maintain good hand hygiene. Discussed sleeping propped up for improvement in dyspnea. Discussed compliance with wearing supplemental oxygen. Advised if no improvement in 3-4 days to f/u in office. Discussed concerning s/s to immediately rtc for recheck or go to ER for evaluation and tx including but not limited to chest pain, dyspnea, fever, chills, decreased I&O, AMS. Pt verbalized understanding.     COPD, chronic respiratory failure with hypercapnia - managed with albuterol, advair, singulair, incruse ellipta, 2L continuous supplemental oxygen.    Tobacco dependence due to cigarettes - decreased to 1/4ppd x 1 week. Hx of 1ppd x 45 years. Congratulated on cutting down on tobacco use. Declined interest in  tobacco cessation. I advised Kermit of the risks of continuing to use tobacco.     Hx tachyarrhythmias, chest pain, htn - followed by Dr. Waite, Cardiology managed with bystolic, daily 81mg ASA, nitrostat prn.     BPH - managed with flomax     Insomnia - controlled with amitriptyline.      Hyperlipidemia - managed with atorvastatin.       Patient educated to follow up in 1 week or sooner than next scheduled appointment if symptoms worsen or do not improve. Patient stated understanding and has agreed with plan of care. After visit summary was printed and given to patient.      This document has been electronically signed by Anjali Reyes PA-C on October 9, 2020 08:35 CDT,.

## 2020-10-21 ENCOUNTER — RESULTS ENCOUNTER (OUTPATIENT)
Dept: FAMILY MEDICINE CLINIC | Facility: CLINIC | Age: 65
End: 2020-10-21

## 2020-10-21 ENCOUNTER — OFFICE VISIT (OUTPATIENT)
Dept: FAMILY MEDICINE CLINIC | Facility: CLINIC | Age: 65
End: 2020-10-21

## 2020-10-21 DIAGNOSIS — F17.200 TOBACCO DEPENDENCE SYNDROME: ICD-10-CM

## 2020-10-21 DIAGNOSIS — Z99.81 REQUIRES SUPPLEMENTAL OXYGEN: ICD-10-CM

## 2020-10-21 DIAGNOSIS — J44.9 COPD, SEVERE (HCC): ICD-10-CM

## 2020-10-21 DIAGNOSIS — J44.1 ACUTE EXACERBATION OF CHRONIC OBSTRUCTIVE PULMONARY DISEASE (COPD) (HCC): ICD-10-CM

## 2020-10-21 DIAGNOSIS — J44.1 ACUTE EXACERBATION OF CHRONIC OBSTRUCTIVE PULMONARY DISEASE (COPD) (HCC): Primary | ICD-10-CM

## 2020-10-21 DIAGNOSIS — J96.12 CHRONIC RESPIRATORY FAILURE WITH HYPERCAPNIA (HCC): ICD-10-CM

## 2020-10-21 PROCEDURE — 99442 PR PHYS/QHP TELEPHONE EVALUATION 11-20 MIN: CPT | Performed by: PHYSICIAN ASSISTANT

## 2020-10-21 RX ORDER — PREDNISONE 10 MG/1
TABLET ORAL
Qty: 7 TABLET | Refills: 0 | Status: SHIPPED | OUTPATIENT
Start: 2020-10-21 | End: 2020-10-28 | Stop reason: SDUPTHER

## 2020-10-21 RX ORDER — LEVOFLOXACIN 500 MG/1
500 TABLET, FILM COATED ORAL DAILY
Qty: 7 TABLET | Refills: 0 | Status: SHIPPED | OUTPATIENT
Start: 2020-10-21 | End: 2020-10-28

## 2020-10-21 NOTE — PROGRESS NOTES
Subjective   Kermit Corley is a 65 y.o. male.   You have chosen to receive care through a telephone visit. Do you consent to use a telephone visit for your medical care today? Yes This visit has been rescheduled as a phone visit to comply with patient safety concerns in accordance with CDC recommendations. Total time of discussion was 15 minutes.     History of Present Illness     Pt presents today for a 1 week f/u on copd exacerbation.    Reports mild improvement in copd flare. Reports mild improvement in dyspnea, mild improvement in productive cough (white). Admits to wheezing worse than baseline. Admits to mild improvement in pleuritic chest tightness.  Pt reports his sx are consistent with previous flare of copd and reports he typically experiences copd flare around this time of year. Reports he has since completed doxycycline 100mg bid x 10 days and medrol dose john. Pt reports he has been taking otc mucinex with minimal improvement. Denies fever, chills, myalgias, sore throat, bilateral ear pain/pressure. Admits to headache, rhinorrhea, nasal congestion. Denies known sick contacts. Denies known exposure to covid-19. Reports using bronchodilators as prescribed, using albuterol inhaler q 4 hours, incruse, and advair inhaler. Utilizing supplemental oxygen 2.5L. Reports was unable to f/u appt with pulmonology d/t being sick.    The following portions of the patient's history were reviewed and updated as appropriate: allergies, current medications, past family history, past medical history, past social history, past surgical history and problem list.    Review of Systems   Constitutional: Positive for fatigue. Negative for activity change, appetite change, chills, diaphoresis, fever, unexpected weight gain and unexpected weight loss.   HENT: Positive for congestion, postnasal drip and rhinorrhea. Negative for dental problem, drooling, ear discharge, ear pain, facial swelling, hearing loss, mouth sores,  nosebleeds, sinus pressure, sneezing, sore throat, swollen glands, tinnitus, trouble swallowing and voice change.    Eyes: Negative for blurred vision, double vision and visual disturbance.   Respiratory: Positive for cough, chest tightness (improving), shortness of breath and wheezing (worse than baseline). Negative for apnea, choking and stridor.    Cardiovascular: Negative for chest pain, palpitations and leg swelling.   Gastrointestinal: Negative for abdominal distention, abdominal pain, constipation, diarrhea, nausea, vomiting, GERD and indigestion.   Endocrine: Negative.    Genitourinary: Negative for dysuria.   Musculoskeletal: Negative for myalgias and neck pain.   Skin: Negative.  Negative for rash.   Neurological: Negative for dizziness, weakness, headache and confusion.   Psychiatric/Behavioral: Negative.        Objective   Physical Exam   Constitutional: He is oriented to person, place, and time. No distress.   Cardiovascular: Normal pulse (patient directed exam).      Pulmonary/Chest: No stridor.  No respiratory distress.Use of oxygen by  nasal cannula noted. He no audible wheeze...He exhibits no tenderness.   Abdominal: Soft. He exhibits no distension. There is no abdominal tenderness.   Musculoskeletal:         General: No edema.   Lymphadenopathy:     He has no cervical adenopathy.   Neurological: He is alert and oriented to person, place, and time.   Skin: Skin is warm and dry. No rash noted. He is not diaphoretic. No erythema. No pallor.   Psychiatric: He has a normal mood and affect.      PE limited d/t telehealth encounter.     Assessment/Plan   Diagnoses and all orders for this visit:    1. Acute exacerbation of chronic obstructive pulmonary disease (COPD) (CMS/[facility]) (Primary)  -     COVID-19,[facility], NP SWAB IN TRANSPORT MEDIA 48-72 HR TAT - Swab, Nasopharynx; Future  -     levoFLOXacin (Levaquin) 500 MG tablet; Take 1 tablet by mouth Daily for 7 days.  Dispense: 7 tablet; Refill:  0  -     predniSONE (DELTASONE) 10 MG tablet; Take 2 tablets for 2 days, take 1 tablet for 2 days, take 1/2 tablet for 2 days.  Dispense: 7 tablet; Refill: 0    2. COPD, severe (CMS/HCC)    3. Chronic respiratory failure with hypercapnia (CMS/HCC)    4. Requires supplemental oxygen    5. Tobacco dependence syndrome      Acute copd exacerbation - Pt verbalizes sx are comparable with previous copd exacerbations in the past. Covid-19 testing faxed to [facility] will notify pt of results when received. Advised to self-isolate until results returned. Begin levaquin and prednisone taper as above. Continue otc mucinex as directed.  Advised to continue bronchodilators as prescribed. Continue 2.5L O2 continuous oxygen supplementation.  Advised to take tylenol otc q 4-6 hours prn for mild pain/fever. Advised pt to maintain good I&O & maintain good hand hygiene. Discussed sleeping propped up for improvement in dyspnea. Discussed compliance with wearing supplemental oxygen. Discussed concerning s/s to immediately rtc for recheck or go to ER for evaluation and tx including but not limited to chest pain, worsening dyspnea, hemoptysis, fever, chills, decreased I&O, AMS. Pt verbalized understanding.     COPD, chronic respiratory failure with hypercapnia - managed with albuterol, advair, singulair, incruse ellipta, 2L continuous supplemental oxygen. Was previously followed by Dr. Lee, pulmonology, who has recently retired. Awaiting appt with pulmonology at [facility]s [facility].      Tobacco dependence due to cigarettes - decreased to 1/2ppd x 1 week. Hx of 1ppd x 45 years. Congratulated on cutting down on tobacco use. Declined interest in tobacco cessation. I advised Kermit of the risks of continuing to use tobacco.      Patient educated to follow up in 1 week or sooner than next scheduled appointment if symptoms worsen or do not improve. Patient stated understanding and has agreed with plan of care. After visit summary was printed and  given to patient.       This document has been electronically signed by Anjali Reyes PA-C on October 21, 2020 08:59 CDT,.

## 2020-10-28 ENCOUNTER — OFFICE VISIT (OUTPATIENT)
Dept: FAMILY MEDICINE CLINIC | Facility: CLINIC | Age: 65
End: 2020-10-28

## 2020-10-28 DIAGNOSIS — F17.200 TOBACCO DEPENDENCE SYNDROME: ICD-10-CM

## 2020-10-28 DIAGNOSIS — J44.9 COPD, SEVERE (HCC): ICD-10-CM

## 2020-10-28 DIAGNOSIS — Z99.81 REQUIRES SUPPLEMENTAL OXYGEN: ICD-10-CM

## 2020-10-28 DIAGNOSIS — J44.1 ACUTE EXACERBATION OF CHRONIC OBSTRUCTIVE PULMONARY DISEASE (COPD) (HCC): Primary | ICD-10-CM

## 2020-10-28 DIAGNOSIS — J96.12 CHRONIC RESPIRATORY FAILURE WITH HYPERCAPNIA (HCC): ICD-10-CM

## 2020-10-28 PROCEDURE — 99442 PR PHYS/QHP TELEPHONE EVALUATION 11-20 MIN: CPT | Performed by: PHYSICIAN ASSISTANT

## 2020-10-28 RX ORDER — PREDNISONE 10 MG/1
TABLET ORAL
Qty: 7 TABLET | Refills: 0 | Status: SHIPPED | OUTPATIENT
Start: 2020-10-28 | End: 2021-01-11

## 2020-10-28 NOTE — PROGRESS NOTES
Subjective   Kermit Corley is a 65 y.o. male.   You have chosen to receive care through a telephone visit. Do you consent to use a telephone visit for your medical care today? Yes This visit has been rescheduled as a phone visit to comply with patient safety concerns in accordance with CDC recommendations. Total time of discussion was 12 minutes.     History of Present Illness     Pt presents today via telephone encounter for f/u on acute copd exacerbation treated with levaquin and prednisone taper. Reports he has since completed levaquin (last day today). Reports significant improvement in dyspnea, chest tightness. Reports occasional cough with clear sputum. Denies chest pain, fever, chills, nausea, vomiting. Reports good appetite. Reports he has been using bronchodilators as prescribed as well as supplemental oxygen 2L daily. Reports using mucinex otc with significant improvement. Covid testing negative 10/22/20. Reports he continues to smoke 1/2 ppd, hx of 1ppd x 45 years.    The following portions of the patient's history were reviewed and updated as appropriate: allergies, current medications, past family history, past medical history, past social history, past surgical history and problem list.    Review of Systems   Constitutional: Positive for fatigue. Negative for activity change, appetite change, chills, diaphoresis, fever, unexpected weight gain and unexpected weight loss.   HENT: Positive for congestion. Negative for dental problem, drooling, ear discharge, ear pain, postnasal drip, rhinorrhea, sinus pressure, sneezing, sore throat, swollen glands, tinnitus, trouble swallowing and voice change.    Eyes: Negative for blurred vision, double vision and visual disturbance.   Respiratory: Positive for cough (chronic), shortness of breath (baseline) and wheezing (baseline). Negative for apnea, choking, chest tightness and stridor.    Cardiovascular: Negative for chest pain, palpitations and leg  swelling.   Gastrointestinal: Negative for abdominal distention, abdominal pain, constipation, diarrhea, nausea, vomiting, GERD and indigestion.   Musculoskeletal: Negative for myalgias, neck pain and neck stiffness.   Skin: Negative.  Negative for rash.   Neurological: Negative for dizziness, weakness, light-headedness, headache and confusion.   Psychiatric/Behavioral: Negative.        Objective   Physical Exam   Constitutional: He is oriented to person, place, and time. No distress.   Cardiovascular: Normal pulse (patient directed exam).      Pulmonary/Chest: No stridor.  No respiratory distress.Use of oxygen by  nasal cannula noted. He no audible wheeze...He exhibits no tenderness.   Abdominal: Soft. He exhibits no distension. There is no abdominal tenderness.   Musculoskeletal:         General: No edema.   Lymphadenopathy:     He has no cervical adenopathy.   Neurological: He is alert and oriented to person, place, and time.   Skin: Skin is warm and dry. No rash noted. He is not diaphoretic. No erythema. No pallor.   Psychiatric: He has a normal mood and affect.      PE limited d/t telehealth encounter.    Assessment/Plan   Diagnoses and all orders for this visit:    1. Acute exacerbation of chronic obstructive pulmonary disease (COPD) (CMS/[facility]) (Primary)  -     predniSONE (DELTASONE) 10 MG tablet; Take 2 tablets for 2 days, take 1 tablet for 2 days, take 1/2 tablet for 2 days.  Dispense: 7 tablet; Refill: 0    2. COPD, severe (CMS/HCC)    3. Chronic respiratory failure with hypercapnia (CMS/[facility])    4. Requires supplemental oxygen    5. Tobacco dependence syndrome        Acute copd exacerbation - improving, Advised to continue bronchodilators as prescribed. Last dose of levaquin today. Will extend prednisone taper d/t lingering symptoms. Continue 2L O2 continuous oxygen supplementation. Continue mucinex otc as directed. Advised to alternate tylenol/ibuprofen otc q 4-6 hours prn for mild pain/fever.  Covid  testing negative 10/22/20.     Tobacco dependence syndrome - 1/2 ppd, hx of 1ppd x 45 years. Advised pt to quit smoking. I advised Kermit of the risks of continuing to use tobacco. During this visit, I spent <3 minutes counseling the patient regarding tobacco cessation. Pt declines interest in tobacco cessation. Encouraged gradual taper to discontinuation of cigarettes.     COPD, chronic respiratory failure with hypercapnia - severe, managed with albuterol, advair, singulair, incruse ellipta, 2L continuous supplemental oxygen. Scheduled f/u with [facility]s clinic for pulmonology specialty management.      Patient educated to follow up sooner than next scheduled appointment if symptoms worsen or do not improve. Patient stated understanding and has agreed with plan of care. After visit summary was printed and given to patient.      This document has been electronically signed by Anjali Ryees PA-C on October 28, 2020 09:50 CDT,.

## 2020-11-19 ENCOUNTER — OFFICE VISIT (OUTPATIENT)
Dept: FAMILY MEDICINE CLINIC | Facility: CLINIC | Age: 65
End: 2020-11-19

## 2020-11-19 DIAGNOSIS — Z09 HOSPITAL DISCHARGE FOLLOW-UP: ICD-10-CM

## 2020-11-19 DIAGNOSIS — Z99.81 REQUIRES SUPPLEMENTAL OXYGEN: ICD-10-CM

## 2020-11-19 DIAGNOSIS — R30.0 DYSURIA: ICD-10-CM

## 2020-11-19 DIAGNOSIS — J96.12 CHRONIC RESPIRATORY FAILURE WITH HYPERCAPNIA (HCC): ICD-10-CM

## 2020-11-19 DIAGNOSIS — J44.9 CHRONIC BRONCHITIS WITH COPD (CHRONIC OBSTRUCTIVE PULMONARY DISEASE) (HCC): ICD-10-CM

## 2020-11-19 DIAGNOSIS — J44.9 COPD, SEVERE (HCC): ICD-10-CM

## 2020-11-19 DIAGNOSIS — J44.1 ACUTE EXACERBATION OF CHRONIC OBSTRUCTIVE PULMONARY DISEASE (COPD) (HCC): Primary | ICD-10-CM

## 2020-11-19 PROCEDURE — 99442 PR PHYS/QHP TELEPHONE EVALUATION 11-20 MIN: CPT | Performed by: PHYSICIAN ASSISTANT

## 2020-11-19 RX ORDER — CEFUROXIME AXETIL 250 MG/1
250 TABLET ORAL
COMMUNITY
Start: 2020-11-18 | End: 2020-11-26

## 2020-11-19 RX ORDER — DOXYCYCLINE HYCLATE 100 MG/1
CAPSULE ORAL
COMMUNITY
Start: 2020-10-09 | End: 2021-01-11

## 2020-12-30 DIAGNOSIS — J44.9 CHRONIC BRONCHITIS WITH COPD (CHRONIC OBSTRUCTIVE PULMONARY DISEASE) (HCC): ICD-10-CM

## 2020-12-30 DIAGNOSIS — J44.9 COPD, SEVERE (HCC): ICD-10-CM

## 2020-12-31 RX ORDER — IPRATROPIUM BROMIDE AND ALBUTEROL SULFATE 2.5; .5 MG/3ML; MG/3ML
SOLUTION RESPIRATORY (INHALATION)
Qty: 360 ML | Refills: 0 | Status: SHIPPED | OUTPATIENT
Start: 2020-12-31 | End: 2021-01-28 | Stop reason: SDUPTHER

## 2021-01-06 NOTE — PROGRESS NOTES
"Subjective   Chief Complaint   Patient presents with   • Vomiting     about 2 weeks   • Diarrhea     stopped yesterday     Kermit Corley is a 62 y.o. male.   Vomiting (about 2 weeks) and Diarrhea (stopped yesterday)    History of Present Illness     Presents today for complaints of vomiting x 2 weeks and diarrhea that stopped yesterday  History of copd - oxygen dependent  Complaints of shortness of breath, wheezing, coughing  Has tried and failed numerous over the counter medications  Feels like he may just need to go to the hospital for evaluation because his breathing has been gradually worsening    The following portions of the patient's history were reviewed and updated as appropriate: allergies, current medications, past family history, past medical history, past social history, past surgical history and problem list.    Review of Systems   Constitutional: Negative for appetite change, chills, fatigue and fever.   HENT: Negative for congestion, ear pain, rhinorrhea and sore throat.    Eyes: Negative for pain.   Respiratory: Positive for cough, chest tightness, shortness of breath and wheezing.    Cardiovascular: Negative for chest pain and palpitations.   Gastrointestinal: Positive for diarrhea, nausea and vomiting. Negative for abdominal pain and constipation.   Genitourinary: Negative for dysuria.   Musculoskeletal: Negative for back pain, joint swelling and neck pain.   Skin: Negative for rash.   Neurological: Negative for dizziness and headaches.       Objective   /76  Pulse 118  Ht 175.3 cm (69.02\")  SpO2 92% Comment: with oxygen tank  Physical Exam   Constitutional: He is oriented to person, place, and time. He appears well-developed and well-nourished.   HENT:   Head: Normocephalic and atraumatic.   Eyes: Pupils are equal, round, and reactive to light.   Neck: Normal range of motion. Neck supple.   Cardiovascular: Normal rate, regular rhythm and normal heart sounds.    Pulmonary/Chest: " Patient would like communication of their results via:        Cell Phone:   Telephone Information:   Mobile [phone]     Okay to leave a message containing results? Yes   He is in respiratory distress. He has wheezes. He has no rales.   Abdominal: Soft. Bowel sounds are normal.   Neurological: He is alert and oriented to person, place, and time.   Skin: Skin is warm and dry.   Psychiatric: He has a normal mood and affect.   Nursing note and vitals reviewed.      Assessment/Plan   Problems Addressed this Visit        Respiratory    COPD exacerbation - Primary      Other Visit Diagnoses     Respiratory distress            Call made to [facility]  Spoke with Dr Bello  Aware of patients history  Patient declined EMS and preferred personal vehicle with family member  Recheck after discharge

## 2021-01-11 ENCOUNTER — OFFICE VISIT (OUTPATIENT)
Dept: FAMILY MEDICINE CLINIC | Facility: CLINIC | Age: 66
End: 2021-01-11

## 2021-01-11 VITALS — HEIGHT: 69 IN | BODY MASS INDEX: 20.88 KG/M2 | WEIGHT: 141 LBS

## 2021-01-11 DIAGNOSIS — J96.12 CHRONIC RESPIRATORY FAILURE WITH HYPERCAPNIA (HCC): ICD-10-CM

## 2021-01-11 DIAGNOSIS — J44.1 ACUTE EXACERBATION OF CHRONIC OBSTRUCTIVE PULMONARY DISEASE (COPD) (HCC): Primary | ICD-10-CM

## 2021-01-11 DIAGNOSIS — Z99.81 REQUIRES SUPPLEMENTAL OXYGEN: ICD-10-CM

## 2021-01-11 DIAGNOSIS — J44.9 COPD, SEVERE (HCC): ICD-10-CM

## 2021-01-11 PROCEDURE — 99442 PR PHYS/QHP TELEPHONE EVALUATION 11-20 MIN: CPT | Performed by: PHYSICIAN ASSISTANT

## 2021-01-11 RX ORDER — LEVOFLOXACIN 500 MG/1
500 TABLET, FILM COATED ORAL DAILY
Qty: 7 TABLET | Refills: 0 | Status: SHIPPED | OUTPATIENT
Start: 2021-01-11 | End: 2021-01-18

## 2021-01-11 RX ORDER — METHYLPREDNISOLONE 4 MG/1
TABLET ORAL
Qty: 1 EACH | Refills: 0 | Status: SHIPPED | OUTPATIENT
Start: 2021-01-11 | End: 2021-01-18

## 2021-01-11 NOTE — PROGRESS NOTES
Subjective   Kermit Corley is a 65 y.o. male.   You have chosen to receive care through a telephone visit. Do you consent to use a telephone visit for your medical care today? Yes This visit has been rescheduled as a phone visit to comply with patient safety concerns in accordance with CDC recommendations. Total time of discussion was 15 minutes.     History of Present Illness     Pt presents today via telephone encounter with a c/c of feeling unwell x 1 week. Pt unable to present to office d/t covid-19 restrictions. Admits to pleuritic chest tightness, dyspnea, productive cough of purulent sputum, myalgias, generalized headache, wheezing, rhinorrhea, nasal congestion. Denies fever, chills, loss of sense of smell or taste, otalgia. Reports taking otc mucinex and dayquil with moderate improvement. Hx of COPD, chronic respiratory failure managed with albuterol, advair, singulair, incruse, 2L continuous supplemental oxygen. Denies known contact or exposure to covid-19. Pt reports his sx are consistent with previous flare of copd.     The following portions of the patient's history were reviewed and updated as appropriate: allergies, current medications, past family history, past medical history, past social history, past surgical history and problem list.    Review of Systems   Constitutional: Positive for fatigue. Negative for activity change, appetite change, chills, diaphoresis, fever, unexpected weight gain and unexpected weight loss.   HENT: Positive for congestion, postnasal drip and rhinorrhea. Negative for dental problem, drooling, ear discharge, ear pain, facial swelling, hearing loss, mouth sores, nosebleeds, sinus pressure, sneezing, sore throat, swollen glands, tinnitus, trouble swallowing and voice change.    Eyes: Negative for blurred vision, double vision and visual disturbance.   Respiratory: Positive for cough, chest tightness (pleuritic), shortness of breath and wheezing (worse than  baseline). Negative for apnea, choking and stridor.    Cardiovascular: Negative for chest pain, palpitations and leg swelling.   Gastrointestinal: Negative for abdominal distention, abdominal pain, constipation, diarrhea, nausea, vomiting, GERD and indigestion.   Endocrine: Negative.    Genitourinary: Negative for dysuria.   Musculoskeletal: Negative for myalgias and neck pain.   Skin: Negative.  Negative for rash.   Neurological: Negative for dizziness, weakness, headache and confusion.   Psychiatric/Behavioral: Negative.        Objective   Physical Exam   Constitutional: He is oriented to person, place, and time. No distress.   Cardiovascular: Normal pulse (patient directed exam).      Pulmonary/Chest: No stridor.  No respiratory distress.Use of oxygen by  nasal cannula noted. He Audible wheeze noted...He exhibits no tenderness.   Abdominal: Soft. He exhibits no distension. There is no abdominal tenderness.   Musculoskeletal:         General: No edema.   Lymphadenopathy:     He has no cervical adenopathy.   Neurological: He is alert and oriented to person, place, and time.   Skin: Skin is warm and dry. No rash noted. He is not diaphoretic. No erythema. No pallor.   Psychiatric: He has a normal mood and affect.        PE limited d/t telehealth encounter.      Assessment/Plan   Diagnoses and all orders for this visit:    1. Acute exacerbation of chronic obstructive pulmonary disease (COPD) (CMS/[facility]) (Primary)  -     COVID-19,[facility] ROUTINE, NP/OP SWAB IN TRANSPORT MEDIA OR ESWAB 72 HR TAT - Swab, Nasopharynx; Future  -     levoFLOXacin (LEVAQUIN) 500 MG tablet; Take 1 tablet by mouth Daily.  Dispense: 7 tablet; Refill: 0  -     methylPREDNISolone (MEDROL) 4 MG dose pack; Take as directed on package instructions.  Dispense: 1 each; Refill: 0    2. COPD, severe (CMS/[facility])    3. Chronic respiratory failure with hypercapnia (CMS/[facility])    4. Requires supplemental oxygen      Acute copd exacerbation - Pt verbalizes sx are  comparable with previous copd exacerbations in the past. Will obtain covid-19 testing for r/o as above. Advised to self-isolate until results returned. Begin levaquin, medrol dose john as above. Continue otc mucinex as directed. Continue bronchodilators as prescribed. Advised pt to maintain good I&O & maintain good hand hygiene. Discussed sleeping propped up for improvement in dyspnea. Discussed compliance with wearing supplemental oxygen. to take tylenol otc q 4-6 hours prn for mild pain/fever. Advised if no improvement in 3 days to f/u in office. Discussed concerning s/s to immediately rtc for recheck or go to ER for evaluation and tx including but not limited to chest pain, dyspnea, fever, chills, decreased I&O, AMS. Pt verbalized understanding. Encouraged strict tobacco cessation.    Patient educated to follow up in 1 week or sooner than next scheduled appointment if symptoms worsen or do not improve. Patient stated understanding and has agreed with plan of care. After visit summary was printed and given to patient.      This document has been electronically signed by Anjali Reyes PA-C on January 11, 2021 12:06 CST,.

## 2021-01-12 ENCOUNTER — LAB (OUTPATIENT)
Dept: LAB | Facility: OTHER | Age: 66
End: 2021-01-12

## 2021-01-12 DIAGNOSIS — J44.1 ACUTE EXACERBATION OF CHRONIC OBSTRUCTIVE PULMONARY DISEASE (COPD) (HCC): ICD-10-CM

## 2021-01-12 PROCEDURE — U0003 INFECTIOUS AGENT DETECTION BY NUCLEIC ACID (DNA OR RNA); SEVERE ACUTE RESPIRATORY SYNDROME CORONAVIRUS 2 (SARS-COV-2) (CORONAVIRUS DISEASE [COVID-19]), AMPLIFIED PROBE TECHNIQUE, MAKING USE OF HIGH THROUGHPUT TECHNOLOGIES AS DESCRIBED BY CMS-2020-01-R: HCPCS | Performed by: PHYSICIAN ASSISTANT

## 2021-01-14 LAB — SARS-COV-2 RNA RESP QL NAA+PROBE: NOT DETECTED

## 2021-01-18 ENCOUNTER — OFFICE VISIT (OUTPATIENT)
Dept: FAMILY MEDICINE CLINIC | Facility: CLINIC | Age: 66
End: 2021-01-18

## 2021-01-18 VITALS — HEIGHT: 69 IN | WEIGHT: 141 LBS | BODY MASS INDEX: 20.88 KG/M2

## 2021-01-18 DIAGNOSIS — J44.1 ACUTE EXACERBATION OF CHRONIC OBSTRUCTIVE PULMONARY DISEASE (COPD) (HCC): Primary | ICD-10-CM

## 2021-01-18 PROCEDURE — 99442 PR PHYS/QHP TELEPHONE EVALUATION 11-20 MIN: CPT | Performed by: PHYSICIAN ASSISTANT

## 2021-01-18 RX ORDER — AZITHROMYCIN 500 MG/1
500 TABLET, FILM COATED ORAL DAILY
Qty: 5 TABLET | Refills: 0 | Status: SHIPPED | OUTPATIENT
Start: 2021-01-18 | End: 2021-01-28

## 2021-01-18 RX ORDER — PREDNISONE 10 MG/1
TABLET ORAL
Qty: 21 TABLET | Refills: 0 | Status: SHIPPED | OUTPATIENT
Start: 2021-01-18 | End: 2021-02-05

## 2021-01-18 NOTE — PROGRESS NOTES
Subjective   Kermit Corley is a 65 y.o. male.   You have chosen to receive care through a telephone visit. Do you consent to use a telephone visit for your medical care today? Yes This visit has been rescheduled as a phone visit to comply with patient safety concerns in accordance with CDC recommendations. Total time of discussion was 15 minutes.     History of Present Illness     Pt presents today via telephone encounter for a 1 week f/u on acute copd exacerbation. Recent covid-19 testing negative. Has completed levaquin and medrol dose john with minimal improvement. Pt admits to feeling unwell x 2 weeks. Admits to pleuritic chest tightness, dyspnea worse than baseline, productive cough of purulent sputum, wheezing, rhinorrhea, nasal congestion.  Denies myalgias, chills, fever, headache, otalgia, loss of sense of taste or smell. Pt with longstanding hx of copd, chronic respiratory failure. He is managed with albuterol, advair, singulair, incruse, 2L continuous supplemental oxygen and follows with pulmonology, Dr. Saleh. Pt reports sx are consistent with previous flare of copd.     The following portions of the patient's history were reviewed and updated as appropriate: allergies, current medications, past family history, past medical history, past social history, past surgical history and problem list.    Review of Systems   Constitutional: Positive for fatigue. Negative for activity change, appetite change, chills, diaphoresis, fever, unexpected weight gain and unexpected weight loss.   HENT: Positive for congestion, postnasal drip and rhinorrhea. Negative for dental problem, drooling, ear discharge, ear pain, facial swelling, hearing loss, mouth sores, nosebleeds, sinus pressure, sneezing, sore throat, swollen glands, tinnitus, trouble swallowing and voice change.    Eyes: Negative for blurred vision, double vision and visual disturbance.   Respiratory: Positive for cough, chest tightness (pleuritic),  shortness of breath and wheezing (worse than baseline). Negative for apnea, choking and stridor.    Cardiovascular: Negative for chest pain, palpitations and leg swelling.   Gastrointestinal: Negative for abdominal distention, abdominal pain, constipation, diarrhea, nausea, vomiting, GERD and indigestion.   Endocrine: Negative.    Genitourinary: Negative for dysuria.   Musculoskeletal: Negative for myalgias and neck pain.   Skin: Negative.  Negative for rash.   Neurological: Negative for dizziness, weakness, headache and confusion.   Psychiatric/Behavioral: Negative.        Objective   Physical Exam   Constitutional: He is oriented to person, place, and time. No distress.   Cardiovascular: Normal pulse (patient directed exam).      Pulmonary/Chest: No stridor.  No respiratory distress.Use of oxygen by  nasal cannula noted. He Audible wheeze noted...He exhibits no tenderness.   Abdominal: Soft. He exhibits no distension. There is no abdominal tenderness.   Musculoskeletal:         General: No edema.   Lymphadenopathy:     He has no cervical adenopathy.   Neurological: He is alert and oriented to person, place, and time.   Skin: Skin is warm and dry. No rash noted. He is not diaphoretic. No erythema. No pallor.   Psychiatric: He has a normal mood and affect.        PE limited d/t telehealth encounter.      Assessment/Plan   Diagnoses and all orders for this visit:    1. Acute exacerbation of chronic obstructive pulmonary disease (COPD) (CMS/[facility]) (Primary)  -     Respiratory Culture - Sputum, Cough; Future  -     XR Chest 2 View; Future  -     predniSONE (DELTASONE) 10 MG tablet; 2 tablets by mouth daily for 1 week, then 1 tablet daily for 1 week  Dispense: 21 tablet; Refill: 0  -     azithromycin (Zithromax) 500 MG tablet; Take 1 tablet by mouth Daily.  Dispense: 5 tablet; Refill: 0      Acute copd exacerbation - Pt verbalizes sx are comparable with previous copd exacerbations in the past. Recent negative covid-19  testing last week. Begin azithromycin, prednisone taper as above. Continue otc mucinex as directed. Continue bronchodilators as prescribed. Advised pt to maintain good I&O & maintain good hand hygiene. Discussed sleeping propped up for improvement in dyspnea. Discussed compliance with wearing supplemental oxygen. to take tylenol otc q 4-6 hours prn for mild pain/fever. Obtain cxr and sputum culture as above. Advised if no improvement in 3 days to f/u in office. Discussed concerning s/s to immediately rtc for recheck or go to ER for evaluation and tx including but not limited to chest pain, dyspnea, fever, chills, decreased I&O, AMS. Pt verbalized understanding. Encouraged strict tobacco cessation.    Patient educated to follow up in 3 days or sooner than next scheduled appointment if symptoms worsen or do not improve. Patient stated understanding and has agreed with plan of care. After visit summary was printed and given to patient.      This document has been electronically signed by Anjali Reyes PA-C on January 18, 2021 14:36 CST,.

## 2021-01-22 ENCOUNTER — OFFICE VISIT (OUTPATIENT)
Dept: FAMILY MEDICINE CLINIC | Facility: CLINIC | Age: 66
End: 2021-01-22

## 2021-01-22 VITALS — WEIGHT: 141 LBS | HEIGHT: 69 IN | BODY MASS INDEX: 20.88 KG/M2

## 2021-01-22 DIAGNOSIS — J96.12 CHRONIC RESPIRATORY FAILURE WITH HYPERCAPNIA (HCC): ICD-10-CM

## 2021-01-22 DIAGNOSIS — J44.9 COPD, SEVERE (HCC): ICD-10-CM

## 2021-01-22 DIAGNOSIS — Z99.81 REQUIRES SUPPLEMENTAL OXYGEN: ICD-10-CM

## 2021-01-22 DIAGNOSIS — J44.1 ACUTE EXACERBATION OF CHRONIC OBSTRUCTIVE PULMONARY DISEASE (COPD) (HCC): Primary | ICD-10-CM

## 2021-01-22 PROCEDURE — 99442 PR PHYS/QHP TELEPHONE EVALUATION 11-20 MIN: CPT | Performed by: PHYSICIAN ASSISTANT

## 2021-01-22 NOTE — PROGRESS NOTES
Subjective   Kermit Corley is a 65 y.o. male.   You have chosen to receive care through a telephone visit. Do you consent to use a telephone visit for your medical care today? Yes This visit has been rescheduled as a phone visit to comply with patient safety concerns in accordance with CDC recommendations. Total time of discussion was 15 minutes.     COPD  He complains of chest tightness (improving), cough, shortness of breath (improving) and sputum production (purulent). There is no difficulty breathing, frequent throat clearing, hemoptysis, hoarse voice or wheezing. This is a new problem. The current episode started 1 to 4 weeks ago. The problem occurs constantly. The problem has been gradually improving. The cough is productive of sputum and productive. Associated symptoms include malaise/fatigue, nasal congestion, postnasal drip and rhinorrhea. Pertinent negatives include no appetite change, chest pain, dyspnea on exertion, ear congestion, ear pain, fever, headaches, heartburn, myalgias, orthopnea, PND, sneezing, sore throat, sweats, trouble swallowing or weight loss. His symptoms are aggravated by nothing. His symptoms are alleviated by beta-agonist, ipratropium, leukotriene antagonist, oral steroids, steroid inhaler and rest. He reports moderate improvement on treatment. Risk factors for lung disease include smoking/tobacco exposure.        Pt presents today via telephone encounter for a 1 week f/u on acute copd exacerbation. Recent covid-19 testing negative. Has completed levaquin and medrol dose john with minimal improvement. Currently taking azithromycin and prednisone taper with moderate improvement in symptoms Pt admits to feeling unwell x 2.5 weeks. Admits to moderate improvement in pleuritic chest tightness, dyspnea worse than baseline but improving, productive cough of purulent sputum, wheezing, rhinorrhea, nasal congestion.  Denies myalgias, chills, fever, headache, otalgia, loss of sense of  taste or smell. Pt with longstanding hx of copd, chronic respiratory failure. He is managed with albuterol, advair, singulair, incruse, 2.5L continuous supplemental oxygen and follows with pulmonology, Dr. Saleh. Pt reports sx are consistent with previous flare of copd.     The following portions of the patient's history were reviewed and updated as appropriate: allergies, current medications, past family history, past medical history, past social history, past surgical history and problem list.    Review of Systems   Constitutional: Positive for fatigue and malaise/fatigue. Negative for activity change, appetite change, chills, diaphoresis, fever, unexpected weight gain and unexpected weight loss.   HENT: Positive for congestion, postnasal drip and rhinorrhea. Negative for dental problem, drooling, ear discharge, ear pain, facial swelling, hearing loss, hoarse voice, mouth sores, nosebleeds, sinus pressure, sneezing, sore throat, tinnitus, trouble swallowing and voice change.    Eyes: Negative for blurred vision, double vision and visual disturbance.   Respiratory: Positive for cough, sputum production (purulent), chest tightness (pleuritic, improving) and shortness of breath (improving). Negative for apnea, hemoptysis, choking, wheezing and stridor.    Cardiovascular: Negative for chest pain, dyspnea on exertion, palpitations and PND.   Gastrointestinal: Negative for abdominal distention, constipation, diarrhea, nausea, GERD and indigestion.   Endocrine: Negative.    Genitourinary: Negative for dysuria.   Musculoskeletal: Negative for myalgias.   Skin: Negative.    Neurological: Negative for dizziness, weakness, headache and confusion.   Psychiatric/Behavioral: Negative.        Objective   Physical Exam   Constitutional: He is oriented to person, place, and time. He appears well-developed and well-nourished. No distress.   HENT:   Head: Normocephalic and atraumatic.   Cardiovascular: Normal pulse (patient  directed exam).      Pulmonary/Chest: Effort normal. No stridor.  No respiratory distress.Use of oxygen by  nasal cannula noted. He no audible wheeze...He exhibits no tenderness.   Abdominal: Soft. He exhibits no distension. There is no abdominal tenderness.   Musculoskeletal:         General: No edema.   Lymphadenopathy:     He has no cervical adenopathy.   Neurological: He is alert and oriented to person, place, and time.   Skin: Skin is warm and dry. No rash noted. He is not diaphoretic. No erythema. No pallor.   Psychiatric: He has a normal mood and affect.        PE limited d/t telehealth encounter.      Assessment/Plan   Diagnoses and all orders for this visit:    1. Acute exacerbation of chronic obstructive pulmonary disease (COPD) (CMS/[facility]) (Primary)    2. COPD, severe (CMS/[facility])    3. Chronic respiratory failure with hypercapnia (CMS/[facility])    4. Requires supplemental oxygen      Acute copd exacerbation - moderate improvement from previous visit. Pt verbalizes sx are comparable with previous copd exacerbations in the past. Recent negative covid-19 testing. Continue azithromycin, prednisone taper as prescribed. Continue otc mucinex as directed. Continue bronchodilators as prescribed. Advised pt to maintain good I&O & maintain good hand hygiene. Discussed sleeping propped up for improvement in dyspnea. Continue 2.5L continuous supplemental oxygen. to take tylenol otc q 4-6 hours prn for mild pain/fever. Obtain cxr and sputum culture as previously ordered for evaluation. Advised if no improvement in 3 days to f/u in office. Discussed concerning s/s to immediately rtc for recheck or go to ER for evaluation and tx including but not limited to chest pain, dyspnea, fever, chills, decreased I&O, AMS. Pt verbalized understanding. Encouraged strict tobacco cessation.    Patient educated to follow up in 3 days or sooner than next scheduled appointment if symptoms worsen or do not improve. Patient stated understanding and  has agreed with plan of care. After visit summary was printed and given to patient.      This document has been electronically signed by Anjali Reyes PA-C on January 22, 2021 14:19 CST,.

## 2021-01-28 ENCOUNTER — OFFICE VISIT (OUTPATIENT)
Dept: FAMILY MEDICINE CLINIC | Facility: CLINIC | Age: 66
End: 2021-01-28

## 2021-01-28 VITALS — WEIGHT: 130 LBS | BODY MASS INDEX: 19.26 KG/M2 | HEIGHT: 69 IN

## 2021-01-28 DIAGNOSIS — J96.12 CHRONIC RESPIRATORY FAILURE WITH HYPERCAPNIA (HCC): ICD-10-CM

## 2021-01-28 DIAGNOSIS — J44.9 COPD, SEVERE (HCC): Primary | ICD-10-CM

## 2021-01-28 DIAGNOSIS — E83.42 HYPOMAGNESEMIA: ICD-10-CM

## 2021-01-28 DIAGNOSIS — F17.200 TOBACCO DEPENDENCE SYNDROME: ICD-10-CM

## 2021-01-28 DIAGNOSIS — Z12.5 SCREENING FOR MALIGNANT NEOPLASM OF PROSTATE: ICD-10-CM

## 2021-01-28 DIAGNOSIS — Z13.21 ENCOUNTER FOR SCREENING FOR NUTRITIONAL DISORDER: ICD-10-CM

## 2021-01-28 DIAGNOSIS — J44.9 CHRONIC BRONCHITIS WITH COPD (CHRONIC OBSTRUCTIVE PULMONARY DISEASE) (HCC): ICD-10-CM

## 2021-01-28 DIAGNOSIS — Z91.89 CANDIDATE FOR STATIN THERAPY DUE TO RISK OF FUTURE CARDIOVASCULAR EVENT: ICD-10-CM

## 2021-01-28 DIAGNOSIS — E55.9 VITAMIN D DEFICIENCY, UNSPECIFIED: ICD-10-CM

## 2021-01-28 DIAGNOSIS — J44.1 ACUTE EXACERBATION OF CHRONIC OBSTRUCTIVE PULMONARY DISEASE (COPD) (HCC): ICD-10-CM

## 2021-01-28 DIAGNOSIS — Z13.29 SCREENING FOR THYROID DISORDER: ICD-10-CM

## 2021-01-28 DIAGNOSIS — F51.01 PRIMARY INSOMNIA: ICD-10-CM

## 2021-01-28 DIAGNOSIS — I10 ESSENTIAL HYPERTENSION: ICD-10-CM

## 2021-01-28 DIAGNOSIS — Z99.81 REQUIRES SUPPLEMENTAL OXYGEN: ICD-10-CM

## 2021-01-28 DIAGNOSIS — R35.0 BENIGN PROSTATIC HYPERPLASIA WITH URINARY FREQUENCY: ICD-10-CM

## 2021-01-28 DIAGNOSIS — N40.1 BENIGN PROSTATIC HYPERPLASIA WITH URINARY FREQUENCY: ICD-10-CM

## 2021-01-28 PROCEDURE — 99442 PR PHYS/QHP TELEPHONE EVALUATION 11-20 MIN: CPT | Performed by: PHYSICIAN ASSISTANT

## 2021-01-28 RX ORDER — ALBUTEROL SULFATE 90 UG/1
2 AEROSOL, METERED RESPIRATORY (INHALATION) EVERY 6 HOURS PRN
Qty: 18 G | Refills: 11 | Status: SHIPPED | OUTPATIENT
Start: 2021-01-28

## 2021-01-28 RX ORDER — IPRATROPIUM BROMIDE AND ALBUTEROL SULFATE 2.5; .5 MG/3ML; MG/3ML
3 SOLUTION RESPIRATORY (INHALATION) 4 TIMES DAILY PRN
Qty: 360 ML | Refills: 3 | Status: SHIPPED | OUTPATIENT
Start: 2021-01-28 | End: 2021-04-28 | Stop reason: SDUPTHER

## 2021-01-28 NOTE — PROGRESS NOTES
"Geri Corley is a 65 y.o. male.   You have chosen to receive care through a telehealth visit.  Do you consent to use a video/audio connection for your medical care today? Yes This visit has been rescheduled as a phone visit to comply with patient safety concerns in accordance with CDC recommendations. Total time of discussion was 20 minutes.     History of Present Illness     Pt presents today via video encounter for a 3 month f/u on chronic conditions.     Tobacco dependence due to cigarettes - decreased to 1/2ppd. Hx 1ppd x 45 years. Declines interest in tobacco cessation, reports \"i'll smoke until I can't anymore\". Last ct low dose lung cancer screen 5/19/20 - noted for no suspicious nodules.     COPD, chronic respiratory failure - chronic, managed with albuterol, advair, singulair, incruse ellipta, 2.5L continuous supplemental oxygen. Following with Dr. Saleh, pulmonology.    Reports significant improvement in recent acute copd flare. Has completed zpak, reports 3 remaining days of prednisone taper. Reports gradually improving dyspnea, per pt, 50% of baseline. Admits to occasional nonproductive cough. Denies wheezing. Pt reports he has been taking otc mucinex with improvement. Denies fever, chills, myalgias, headache, rhinorrhea, nasal congestion, bilateral ear pressure. Denies known sick contacts. Recent negative covid-19 testing.    Hx tachyarrhythmias, chest pain, htn - followed by Dr. Waite, Cardiology managed with bystolic, daily 81mg ASA, nitrostat prn. Denies chest pain, palpitations, dizziness, lightheadedness, syncope/presyncope, peripheral edema.    BPH -well controlled,  managed with flomax. Denies urinary sx.      Insomnia - chronic, well controlled with amitriptyline.      Hyperlipidemia - managed with atorvastatin.     Peripheral edema - chronic, well controlled, managed with lasix daily prn. Reports having to take medication 3-4 days of the month. Echo performed 3/9/20 " noted for normal EF (56-60%), normal lef ventricular wall size, thickness, and contractility. Mild MRV, moderate AVR.     The following portions of the patient's history were reviewed and updated as appropriate: allergies, current medications, past family history, past medical history, past social history, past surgical history and problem list.    Review of Systems   Constitutional: Positive for fatigue. Negative for activity change, appetite change, chills, diaphoresis, fever, unexpected weight gain and unexpected weight loss.   HENT: Positive for congestion, postnasal drip and rhinorrhea. Negative for dental problem, drooling, ear discharge, ear pain, facial swelling, hearing loss, mouth sores, nosebleeds, sinus pressure, sneezing, sore throat, swollen glands, tinnitus, trouble swallowing and voice change.    Eyes: Negative for blurred vision, double vision and visual disturbance.   Respiratory: Positive for cough, chest tightness, shortness of breath and wheezing (worse than baseline). Negative for apnea, choking and stridor.    Cardiovascular: Negative for chest pain, palpitations and leg swelling.   Gastrointestinal: Negative for abdominal distention, abdominal pain, constipation, diarrhea, nausea, vomiting, GERD and indigestion.   Endocrine: Negative.    Genitourinary: Negative for dysuria.   Musculoskeletal: Negative for myalgias and neck pain.   Skin: Negative.  Negative for rash.   Neurological: Negative for dizziness, weakness, headache and confusion.   Psychiatric/Behavioral: Negative.        Objective   Physical Exam   Constitutional: He is oriented to person, place, and time. He appears well-developed. He is active and cooperative. No distress.   Appears frail   HENT:   Head: Normocephalic and atraumatic.   Right Ear: External ear normal.   Left Ear: External ear normal.   Nose: Nose normal. No mucosal edema, rhinorrhea or congestion.   Neck: Normal range of motion. Neck supple.   Pulmonary/Chest:  Effort normal and breath sounds normal. No accessory muscle usage or stridor. No apnea, no tachypnea and no bradypnea. No respiratory distress. He has no wheezes.   Supplemental oxygen by nasal cannula in place.   Abdominal: Soft. Bowel sounds are normal. He exhibits no distension. There is no abdominal tenderness.   Musculoskeletal: Normal range of motion.      Right lower leg: No edema.      Left lower leg: No edema.   Lymphadenopathy:     He has no cervical adenopathy.   Neurological: He is alert and oriented to person, place, and time.   Skin: Skin is warm and dry. No rash noted. He is not diaphoretic. No erythema. No pallor.   Psychiatric: His behavior is normal. Mood, judgment and thought content normal.     Physical exam limited due to telehealth encounter.    Assessment/Plan   Diagnoses and all orders for this visit:    1. COPD, severe (CMS/[facility]) (Primary)  -     ipratropium-albuterol (DUO-NEB) 0.5-2.5 mg/3 ml nebulizer; Take 3 mL by nebulization 4 (Four) Times a Day As Needed for Wheezing.  Dispense: 360 mL; Refill: 3  -     fluticasone-salmeterol (Advair Diskus) 500-50 MCG/DOSE DISKUS; Inhale 1 puff 2 (Two) Times a Day.  Dispense: 60 each; Refill: 11    2. Chronic bronchitis with COPD (chronic obstructive pulmonary disease) (CMS/HCC)  -     ipratropium-albuterol (DUO-NEB) 0.5-2.5 mg/3 ml nebulizer; Take 3 mL by nebulization 4 (Four) Times a Day As Needed for Wheezing.  Dispense: 360 mL; Refill: 3  -     Umeclidinium Bromide (INCRUSE ELLIPTA) 62.5 MCG/INH aerosol powder ; Inhale 1 puff Daily.  Dispense: 1 each; Refill: 11    3. Acute exacerbation of chronic obstructive pulmonary disease (COPD) (CMS/[facility])  -     albuterol sulfate  (90 Base) MCG/ACT inhaler; Inhale 2 puffs Every 6 (Six) Hours As Needed for Wheezing or Shortness of Air.  Dispense: 18 g; Refill: 11    4. Chronic respiratory failure with hypercapnia (CMS/[facility])  -     Umeclidinium Bromide (INCRUSE ELLIPTA) 62.5 MCG/INH aerosol powder ; Inhale  1 puff Daily.  Dispense: 1 each; Refill: 11    5. Requires supplemental oxygen    6. Tobacco dependence syndrome    7. Primary insomnia    8. Benign prostatic hyperplasia with urinary frequency    9. Screening for thyroid disorder  -     TSH; Future  -     T4, free; Future    10. Candidate for statin therapy due to risk of future cardiovascular event    11. Hypomagnesemia  -     Magnesium; Future    12. Essential hypertension  -     CBC w AUTO Differential; Future  -     Comprehensive metabolic panel; Future  -     Lipid panel; Future    13. Screening for malignant neoplasm of prostate  -     PSA SCREENING; Future    14. Encounter for screening for nutritional disorder  -     Vitamin D 25 hydroxy; Future  -     Vitamin B12; Future  -     Folate; Future    15. Vitamin D deficiency, unspecified   -     Vitamin D 25 hydroxy; Future      Baseline labs ordered as above for further evaluation and assessment of chronic conditions. Will notify pt of results when received and address appropriately    Acute copd exacerbation - continuing to improve. Recent negative covid-19 testing. Continue prednisone taper as prescribed until completion. Continue otc mucinex prn. Continue bronchodilators as prescribed. Advised pt to maintain good I&O & maintain good hand hygiene. Discussed sleeping propped up for improvement in dyspnea. Continue 2.5L continuous supplemental oxygen. to take tylenol otc q 4-6 hours prn for mild pain/fever. Advised if no improvement in 3 days to f/u in office. Discussed concerning s/s to immediately rtc for recheck or go to ER for evaluation and tx including but not limited to chest pain, dyspnea, fever, chills, decreased I&O, AMS. Pt verbalized understanding. Encouraged strict tobacco cessation.    COPD, chronic respiratory failure with hypercapnia - managed with albuterol, advair, singulair, incruse ellipta, 2L continuous supplemental oxygen. Following with pulmonology, Dr. Saleh.    Tobacco dependence  due to cigarettes - 1/2 ppd. Hx of 1ppd x 45 years.  Declined interest in tobacco cessation. I advised Kermit of the risks of continuing to use tobacco. I have educated him on the risk of diseases from using tobacco products such as cancer, COPD and heart disease. LDCT lung cancer screening due 5/2021.  I advised him to quit and he is not willing to quit.  I spent 3  minutes counseling the patient.    Hx tachyarrhythmias, chest pain, htn - stable, NAD, asymptomatic. followed by Dr. Waite, Cardiology managed with bystolic, daily 81mg ASA, nitrostat prn.     BPH - managed with flomax     Insomnia - controlled with amitriptyline.      Hyperlipidemia - managed with atorvastatin.       Patient educated to follow up in 3 months or sooner than next scheduled appointment if symptoms worsen or do not improve. Patient stated understanding and has agreed with plan of care. After visit summary was printed and given to patient.      This document has been electronically signed by Anjali Reyes PA-C on January 28, 2021 11:00 CST,.

## 2021-02-02 ENCOUNTER — CLINICAL SUPPORT (OUTPATIENT)
Dept: FAMILY MEDICINE CLINIC | Facility: CLINIC | Age: 66
End: 2021-02-02

## 2021-02-02 ENCOUNTER — LAB (OUTPATIENT)
Dept: LAB | Facility: OTHER | Age: 66
End: 2021-02-02

## 2021-02-02 DIAGNOSIS — Z13.0 SCREENING, IRON DEFICIENCY ANEMIA: Primary | ICD-10-CM

## 2021-02-02 DIAGNOSIS — Z13.29 SCREENING FOR THYROID DISORDER: ICD-10-CM

## 2021-02-02 DIAGNOSIS — I10 ESSENTIAL HYPERTENSION: ICD-10-CM

## 2021-02-02 DIAGNOSIS — Z23 NEED FOR VACCINATION: Primary | ICD-10-CM

## 2021-02-02 DIAGNOSIS — Z13.21 ENCOUNTER FOR SCREENING FOR NUTRITIONAL DISORDER: ICD-10-CM

## 2021-02-02 DIAGNOSIS — E55.9 VITAMIN D DEFICIENCY, UNSPECIFIED: ICD-10-CM

## 2021-02-02 DIAGNOSIS — Z12.5 SCREENING FOR MALIGNANT NEOPLASM OF PROSTATE: ICD-10-CM

## 2021-02-02 DIAGNOSIS — E83.42 HYPOMAGNESEMIA: ICD-10-CM

## 2021-02-02 DIAGNOSIS — Z13.0 SCREENING, IRON DEFICIENCY ANEMIA: ICD-10-CM

## 2021-02-02 LAB
ALBUMIN SERPL-MCNC: 3.7 G/DL (ref 3.5–5)
ALBUMIN/GLOB SERPL: 1.2 G/DL (ref 1.1–1.8)
ALP SERPL-CCNC: 88 U/L (ref 38–126)
ALT SERPL W P-5'-P-CCNC: 18 U/L
ANION GAP SERPL CALCULATED.3IONS-SCNC: 7 MMOL/L (ref 5–15)
AST SERPL-CCNC: 22 U/L (ref 17–59)
BASOPHILS # BLD AUTO: 0.03 10*3/MM3 (ref 0–0.2)
BASOPHILS NFR BLD AUTO: 0.3 % (ref 0–1.5)
BILIRUB SERPL-MCNC: 0.4 MG/DL (ref 0.2–1.3)
BUN SERPL-MCNC: 9 MG/DL (ref 7–23)
BUN/CREAT SERPL: 15 (ref 7–25)
CALCIUM SPEC-SCNC: 9.3 MG/DL (ref 8.4–10.2)
CHLORIDE SERPL-SCNC: 92 MMOL/L (ref 101–112)
CHOLEST SERPL-MCNC: 143 MG/DL (ref 150–200)
CO2 SERPL-SCNC: 37 MMOL/L (ref 22–30)
CREAT SERPL-MCNC: 0.6 MG/DL (ref 0.7–1.3)
DEPRECATED RDW RBC AUTO: 46.1 FL (ref 37–54)
EOSINOPHIL # BLD AUTO: 0.16 10*3/MM3 (ref 0–0.4)
EOSINOPHIL NFR BLD AUTO: 1.4 % (ref 0.3–6.2)
ERYTHROCYTE [DISTWIDTH] IN BLOOD BY AUTOMATED COUNT: 13.8 % (ref 12.3–15.4)
GFR SERPL CREATININE-BSD FRML MDRD: 134 ML/MIN/1.73 (ref 49–113)
GLOBULIN UR ELPH-MCNC: 3 GM/DL (ref 2.3–3.5)
GLUCOSE SERPL-MCNC: 97 MG/DL (ref 70–99)
HCT VFR BLD AUTO: 40.9 % (ref 37.5–51)
HDLC SERPL-MCNC: 57 MG/DL (ref 40–59)
HGB BLD-MCNC: 12.3 G/DL (ref 13–17.7)
IRON 24H UR-MRATE: 39 MCG/DL (ref 59–158)
IRON SATN MFR SERPL: 11 % (ref 20–50)
LDLC SERPL CALC-MCNC: 68 MG/DL
LDLC/HDLC SERPL: 1.16 {RATIO} (ref 0–3.55)
LYMPHOCYTES # BLD AUTO: 1.35 10*3/MM3 (ref 0.7–3.1)
LYMPHOCYTES NFR BLD AUTO: 11.5 % (ref 19.6–45.3)
MAGNESIUM SERPL-MCNC: 2 MG/DL (ref 1.6–2.3)
MCH RBC QN AUTO: 28.3 PG (ref 26.6–33)
MCHC RBC AUTO-ENTMCNC: 30.1 G/DL (ref 31.5–35.7)
MCV RBC AUTO: 94.2 FL (ref 79–97)
MONOCYTES # BLD AUTO: 1.07 10*3/MM3 (ref 0.1–0.9)
MONOCYTES NFR BLD AUTO: 9.1 % (ref 5–12)
NEUTROPHILS NFR BLD AUTO: 77.7 % (ref 42.7–76)
NEUTROPHILS NFR BLD AUTO: 9.16 10*3/MM3 (ref 1.7–7)
PLATELET # BLD AUTO: 229 10*3/MM3 (ref 140–450)
PMV BLD AUTO: 10 FL (ref 6–12)
POTASSIUM SERPL-SCNC: 4 MMOL/L (ref 3.4–5)
PROT SERPL-MCNC: 6.7 G/DL (ref 6.3–8.6)
RBC # BLD AUTO: 4.34 10*6/MM3 (ref 4.14–5.8)
SODIUM SERPL-SCNC: 136 MMOL/L (ref 137–145)
TIBC SERPL-MCNC: 344 MCG/DL (ref 298–536)
TRANSFERRIN SERPL-MCNC: 231 MG/DL (ref 200–360)
TRIGL SERPL-MCNC: 98 MG/DL
VLDLC SERPL-MCNC: 18 MG/DL (ref 5–40)
WBC # BLD AUTO: 11.77 10*3/MM3 (ref 3.4–10.8)

## 2021-02-02 PROCEDURE — 90471 IMMUNIZATION ADMIN: CPT | Performed by: PHYSICIAN ASSISTANT

## 2021-02-02 PROCEDURE — 85025 COMPLETE CBC W/AUTO DIFF WBC: CPT | Performed by: PHYSICIAN ASSISTANT

## 2021-02-02 PROCEDURE — 90694 VACC AIIV4 NO PRSRV 0.5ML IM: CPT | Performed by: PHYSICIAN ASSISTANT

## 2021-02-02 PROCEDURE — 90715 TDAP VACCINE 7 YRS/> IM: CPT | Performed by: PHYSICIAN ASSISTANT

## 2021-02-02 PROCEDURE — 82746 ASSAY OF FOLIC ACID SERUM: CPT | Performed by: PHYSICIAN ASSISTANT

## 2021-02-02 PROCEDURE — G0103 PSA SCREENING: HCPCS | Performed by: PHYSICIAN ASSISTANT

## 2021-02-02 PROCEDURE — 82607 VITAMIN B-12: CPT | Performed by: PHYSICIAN ASSISTANT

## 2021-02-02 PROCEDURE — 80053 COMPREHEN METABOLIC PANEL: CPT | Performed by: PHYSICIAN ASSISTANT

## 2021-02-02 PROCEDURE — 84439 ASSAY OF FREE THYROXINE: CPT | Performed by: PHYSICIAN ASSISTANT

## 2021-02-02 PROCEDURE — 90472 IMMUNIZATION ADMIN EACH ADD: CPT | Performed by: PHYSICIAN ASSISTANT

## 2021-02-02 PROCEDURE — 80061 LIPID PANEL: CPT | Performed by: PHYSICIAN ASSISTANT

## 2021-02-02 PROCEDURE — 84466 ASSAY OF TRANSFERRIN: CPT | Performed by: PHYSICIAN ASSISTANT

## 2021-02-02 PROCEDURE — 83540 ASSAY OF IRON: CPT | Performed by: PHYSICIAN ASSISTANT

## 2021-02-02 PROCEDURE — 82306 VITAMIN D 25 HYDROXY: CPT | Performed by: PHYSICIAN ASSISTANT

## 2021-02-02 PROCEDURE — 83735 ASSAY OF MAGNESIUM: CPT | Performed by: PHYSICIAN ASSISTANT

## 2021-02-02 PROCEDURE — 84443 ASSAY THYROID STIM HORMONE: CPT | Performed by: PHYSICIAN ASSISTANT

## 2021-02-02 PROCEDURE — 36415 COLL VENOUS BLD VENIPUNCTURE: CPT | Performed by: PHYSICIAN ASSISTANT

## 2021-02-03 ENCOUNTER — LAB (OUTPATIENT)
Dept: LAB | Facility: OTHER | Age: 66
End: 2021-02-03

## 2021-02-03 DIAGNOSIS — J44.1 ACUTE EXACERBATION OF CHRONIC OBSTRUCTIVE PULMONARY DISEASE (COPD) (HCC): ICD-10-CM

## 2021-02-03 LAB
25(OH)D3 SERPL-MCNC: 19.6 NG/ML (ref 30–100)
FOLATE SERPL-MCNC: 7.2 NG/ML (ref 4.78–24.2)
PSA SERPL-MCNC: 2.52 NG/ML (ref 0–4)
T4 FREE SERPL-MCNC: 1.17 NG/DL (ref 0.93–1.7)
TSH SERPL DL<=0.05 MIU/L-ACNC: 1.08 UIU/ML (ref 0.27–4.2)
VIT B12 BLD-MCNC: 433 PG/ML (ref 211–946)

## 2021-02-03 PROCEDURE — 87077 CULTURE AEROBIC IDENTIFY: CPT | Performed by: PHYSICIAN ASSISTANT

## 2021-02-03 PROCEDURE — 87186 SC STD MICRODIL/AGAR DIL: CPT | Performed by: PHYSICIAN ASSISTANT

## 2021-02-03 PROCEDURE — 87205 SMEAR GRAM STAIN: CPT | Performed by: PHYSICIAN ASSISTANT

## 2021-02-03 PROCEDURE — 87070 CULTURE OTHR SPECIMN AEROBIC: CPT | Performed by: PHYSICIAN ASSISTANT

## 2021-02-04 DIAGNOSIS — E55.9 VITAMIN D DEFICIENCY: Primary | ICD-10-CM

## 2021-02-04 DIAGNOSIS — D64.9 MILD ANEMIA: ICD-10-CM

## 2021-02-04 DIAGNOSIS — D50.9 IRON DEFICIENCY ANEMIA, UNSPECIFIED IRON DEFICIENCY ANEMIA TYPE: ICD-10-CM

## 2021-02-04 RX ORDER — LANOLIN ALCOHOL/MO/W.PET/CERES
325 CREAM (GRAM) TOPICAL 2 TIMES DAILY
Qty: 60 TABLET | Refills: 5 | Status: SHIPPED | OUTPATIENT
Start: 2021-02-04 | End: 2021-09-23 | Stop reason: SDUPTHER

## 2021-02-04 RX ORDER — ERGOCALCIFEROL 1.25 MG/1
50000 CAPSULE ORAL WEEKLY
Qty: 12 CAPSULE | Refills: 3 | Status: SHIPPED | OUTPATIENT
Start: 2021-02-04 | End: 2021-08-30 | Stop reason: SDUPTHER

## 2021-02-04 RX ORDER — DOCUSATE SODIUM 100 MG/1
100 CAPSULE, LIQUID FILLED ORAL DAILY PRN
Qty: 30 CAPSULE | Refills: 5 | Status: SHIPPED | OUTPATIENT
Start: 2021-02-04 | End: 2021-08-30 | Stop reason: SDUPTHER

## 2021-02-05 DIAGNOSIS — J44.1 COPD WITH EXACERBATION (HCC): Primary | ICD-10-CM

## 2021-02-05 LAB
BACTERIA SPEC RESP CULT: ABNORMAL
BACTERIA SPEC RESP CULT: ABNORMAL
GRAM STN SPEC: ABNORMAL

## 2021-02-05 RX ORDER — LEVOFLOXACIN 500 MG/1
500 TABLET, FILM COATED ORAL DAILY
Qty: 10 TABLET | Refills: 0 | Status: SHIPPED | OUTPATIENT
Start: 2021-02-05 | End: 2021-04-22

## 2021-04-22 ENCOUNTER — OFFICE VISIT (OUTPATIENT)
Dept: FAMILY MEDICINE CLINIC | Facility: CLINIC | Age: 66
End: 2021-04-22

## 2021-04-22 VITALS — WEIGHT: 125 LBS | BODY MASS INDEX: 18.51 KG/M2 | HEIGHT: 69 IN

## 2021-04-22 DIAGNOSIS — J44.1 COPD WITH EXACERBATION (HCC): Primary | ICD-10-CM

## 2021-04-22 DIAGNOSIS — R09.81 SINUS CONGESTION: ICD-10-CM

## 2021-04-22 PROCEDURE — 99443 PR PHYS/QHP TELEPHONE EVALUATION 21-30 MIN: CPT | Performed by: NURSE PRACTITIONER

## 2021-04-22 RX ORDER — PREDNISONE 1 MG/1
TABLET ORAL
Qty: 15 TABLET | Refills: 0 | Status: SHIPPED | OUTPATIENT
Start: 2021-04-22 | End: 2021-04-28 | Stop reason: ALTCHOICE

## 2021-04-22 RX ORDER — LEVOFLOXACIN 500 MG/1
500 TABLET, FILM COATED ORAL NIGHTLY
Qty: 10 TABLET | Refills: 0 | Status: SHIPPED | OUTPATIENT
Start: 2021-04-22 | End: 2021-06-15

## 2021-04-22 RX ORDER — FLUTICASONE PROPIONATE 50 MCG
1 SPRAY, SUSPENSION (ML) NASAL 2 TIMES DAILY
Qty: 16 G | Refills: 0 | Status: SHIPPED | OUTPATIENT
Start: 2021-04-22 | End: 2021-09-09 | Stop reason: SDUPTHER

## 2021-04-28 ENCOUNTER — OFFICE VISIT (OUTPATIENT)
Dept: FAMILY MEDICINE CLINIC | Facility: CLINIC | Age: 66
End: 2021-04-28

## 2021-04-28 DIAGNOSIS — J44.9 CHRONIC BRONCHITIS WITH COPD (CHRONIC OBSTRUCTIVE PULMONARY DISEASE) (HCC): ICD-10-CM

## 2021-04-28 DIAGNOSIS — J44.9 COPD, SEVERE (HCC): ICD-10-CM

## 2021-04-28 DIAGNOSIS — J44.1 COPD WITH EXACERBATION (HCC): Primary | ICD-10-CM

## 2021-04-28 DIAGNOSIS — Z99.81 REQUIRES SUPPLEMENTAL OXYGEN: ICD-10-CM

## 2021-04-28 DIAGNOSIS — J96.12 CHRONIC RESPIRATORY FAILURE WITH HYPERCAPNIA (HCC): ICD-10-CM

## 2021-04-28 DIAGNOSIS — F17.200 TOBACCO DEPENDENCE SYNDROME: ICD-10-CM

## 2021-04-28 PROCEDURE — 99442 PR PHYS/QHP TELEPHONE EVALUATION 11-20 MIN: CPT | Performed by: PHYSICIAN ASSISTANT

## 2021-04-28 RX ORDER — PREDNISONE 10 MG/1
TABLET ORAL
Qty: 21 TABLET | Refills: 0 | Status: SHIPPED | OUTPATIENT
Start: 2021-04-28 | End: 2021-06-15

## 2021-04-28 RX ORDER — FLUCONAZOLE 200 MG/1
200 TABLET ORAL DAILY
Qty: 5 TABLET | Refills: 0 | Status: SHIPPED | OUTPATIENT
Start: 2021-04-28 | End: 2021-06-15

## 2021-04-28 RX ORDER — IPRATROPIUM BROMIDE AND ALBUTEROL SULFATE 2.5; .5 MG/3ML; MG/3ML
3 SOLUTION RESPIRATORY (INHALATION) 4 TIMES DAILY PRN
Qty: 360 ML | Refills: 5 | Status: SHIPPED | OUTPATIENT
Start: 2021-04-28 | End: 2021-05-21

## 2021-04-28 RX ORDER — BENZONATATE 200 MG/1
200 CAPSULE ORAL 3 TIMES DAILY PRN
Qty: 60 CAPSULE | Refills: 0 | Status: SHIPPED | OUTPATIENT
Start: 2021-04-28 | End: 2021-06-18

## 2021-04-28 NOTE — PROGRESS NOTES
Subjective   Kermit Corley is a 66 y.o. male.   You have chosen to receive care through a telephone visit. Do you consent to use a telephone visit for your medical care today? Yes This visit has been rescheduled as a phone visit to comply with patient safety concerns in accordance with CDC recommendations. Total time of discussion was 15 minutes.     History of Present Illness     Patient presents today via telehealth encounter with a c/c of feeling unwell x 10 days. He admits to productive cough of clear sputum, dyspnea worse than baseline. He denies fever, chills, myalgias. Admits to nasal congestion, bilateral ear pressure. He was seen approximately 7 days prior and treated with prednisone taper, levaquin, flonase. Reports has been using advair, umeclidium, and nebs q 6 hours. He admits to pleuritic chest tightness, wheezing worse than baseline. He denies loss of taste or smell. He does admit to longstanding hx of copd with chronic respiratory failure. He has increased from 2L oxygen per nc to using 2.5L/nc regularly. He does report hx of copd exacerbation with candidal infection as well as acinetobacter in February per sputum culture. Reports continuing to smoke 1ppd.     The following portions of the patient's history were reviewed and updated as appropriate: allergies, current medications, past family history, past medical history, past social history, past surgical history and problem list.    Review of Systems   Constitutional: Positive for fatigue. Negative for activity change, appetite change, chills, diaphoresis, fever, unexpected weight gain and unexpected weight loss.   HENT: Positive for congestion. Negative for dental problem, drooling, ear discharge, ear pain, facial swelling, hearing loss, mouth sores, nosebleeds, postnasal drip, rhinorrhea, sinus pressure, sneezing, sore throat, swollen glands, tinnitus, trouble swallowing and voice change.    Eyes: Negative for blurred vision, double  vision and visual disturbance.   Respiratory: Positive for cough, chest tightness (pleuritic), shortness of breath and wheezing (worse than baseline). Negative for apnea, choking and stridor.    Cardiovascular: Negative for chest pain, palpitations and leg swelling.   Gastrointestinal: Negative for abdominal distention, abdominal pain, constipation, diarrhea, nausea, vomiting, GERD and indigestion.   Endocrine: Negative.    Genitourinary: Negative for dysuria.   Musculoskeletal: Negative for myalgias and neck pain.   Skin: Negative.  Negative for rash.   Neurological: Negative for dizziness, weakness, headache and confusion.   Psychiatric/Behavioral: Negative.        Objective   Physical Exam   Constitutional: He is oriented to person, place, and time. No distress.   Cardiovascular: Normal pulse (patient directed exam).      Pulmonary/Chest: No stridor.  No respiratory distress.Use of oxygen by  nasal cannula noted. He Audible wheeze noted...He exhibits no tenderness.   Abdominal: Soft. He exhibits no distension. There is no abdominal tenderness.   Musculoskeletal:         General: No edema.   Lymphadenopathy:     He has no cervical adenopathy.   Neurological: He is alert and oriented to person, place, and time.   Skin: Skin is warm and dry. No rash noted. He is not diaphoretic. No erythema. No pallor.   Psychiatric: He has a normal mood and affect.        PE limited d/t telehealth encounter.      Assessment/Plan   Diagnoses and all orders for this visit:    1. COPD with exacerbation (CMS/HCC) (Primary)  -     predniSONE (DELTASONE) 10 MG (21) dose pack; Use as directed on package  Dispense: 21 tablet; Refill: 0  -     benzonatate (TESSALON) 200 MG capsule; Take 1 capsule by mouth 3 (Three) Times a Day As Needed for Cough.  Dispense: 60 capsule; Refill: 0    2. COPD, severe (CMS/HCC)  -     ipratropium-albuterol (DUO-NEB) 0.5-2.5 mg/3 ml nebulizer; Take 3 mL by nebulization 4 (Four) Times a Day As Needed for  Wheezing.  Dispense: 360 mL; Refill: 5    3. Chronic bronchitis with COPD (chronic obstructive pulmonary disease) (CMS/HCC)  -     ipratropium-albuterol (DUO-NEB) 0.5-2.5 mg/3 ml nebulizer; Take 3 mL by nebulization 4 (Four) Times a Day As Needed for Wheezing.  Dispense: 360 mL; Refill: 5    4. Chronic respiratory failure with hypercapnia (CMS/HCC)    5. Requires supplemental oxygen    6. Tobacco dependence syndrome    Other orders  -     fluconazole (Diflucan) 200 MG tablet; Take 1 tablet by mouth Daily.  Dispense: 5 tablet; Refill: 0      Acute copd exacerbation - Pt verbalizes sx are comparable with previous copd exacerbations in the past. Continue 2.5L/nc regularly as directed. Continue levaquin as prescribed until completion. Begin prednisone taper, tessalon perles as prescribed. Separate levaquin and prednisone taper by at least 2-4 hours. As patient has had previous acute copd exacerbations with sputum cultures with coinfection with candida, will go ahead and treat with diflucan as above as well. Continue otc mucinex as directed. Continue bronchodilators as prescribed. Advised pt to maintain good I&O & maintain good hand hygiene. Recommended sputum culture, covid-19 testing, and cxr, pt declined, he will consider if sx worsen or fail to improve. Discussed sleeping propped up for improvement in dyspnea. Discussed compliance with wearing supplemental oxygen. Advised he may take tylenol otc q 4-6 hours prn for mild pain/fever. Advised if no improvement in 3 days to f/u in office. Discussed concerning s/s to immediately rtc for recheck or go to ER for evaluation and tx including but not limited to chest pain, dyspnea, fever, chills, decreased I&O, AMS. Pt verbalized understanding. Encouraged strict tobacco cessation.    Patient educated to follow up in 3 days or sooner than next scheduled appointment if symptoms worsen or do not improve. Patient stated understanding and has agreed with plan of care. After visit  summary was printed and given to patient.      This document has been electronically signed by Anjali Reyes PA-C on April 28, 2021 10:53 CDT,.

## 2021-04-29 RX ORDER — MONTELUKAST SODIUM 10 MG/1
TABLET ORAL
Qty: 90 TABLET | Refills: 0 | Status: SHIPPED | OUTPATIENT
Start: 2021-04-29 | End: 2021-08-13

## 2021-05-02 DIAGNOSIS — J44.9 COPD, SEVERE (HCC): ICD-10-CM

## 2021-05-04 DIAGNOSIS — F51.01 PRIMARY INSOMNIA: ICD-10-CM

## 2021-05-05 RX ORDER — AMITRIPTYLINE HYDROCHLORIDE 100 MG/1
TABLET, FILM COATED ORAL
Qty: 90 TABLET | Refills: 0 | Status: SHIPPED | OUTPATIENT
Start: 2021-05-05 | End: 2021-08-30 | Stop reason: SDUPTHER

## 2021-05-19 DIAGNOSIS — J44.9 COPD, SEVERE (HCC): ICD-10-CM

## 2021-05-19 DIAGNOSIS — E78.00 ELEVATED LDL CHOLESTEROL LEVEL: ICD-10-CM

## 2021-05-19 DIAGNOSIS — J44.9 CHRONIC BRONCHITIS WITH COPD (CHRONIC OBSTRUCTIVE PULMONARY DISEASE) (HCC): ICD-10-CM

## 2021-05-19 DIAGNOSIS — Z91.89 CANDIDATE FOR STATIN THERAPY DUE TO RISK OF FUTURE CARDIOVASCULAR EVENT: ICD-10-CM

## 2021-05-21 RX ORDER — IPRATROPIUM BROMIDE AND ALBUTEROL SULFATE 2.5; .5 MG/3ML; MG/3ML
SOLUTION RESPIRATORY (INHALATION)
Qty: 360 ML | Refills: 0 | Status: SHIPPED | OUTPATIENT
Start: 2021-05-21 | End: 2021-06-24

## 2021-05-21 RX ORDER — ATORVASTATIN CALCIUM 10 MG/1
TABLET, FILM COATED ORAL
Qty: 90 TABLET | Refills: 0 | Status: SHIPPED | OUTPATIENT
Start: 2021-05-21 | End: 2021-08-30 | Stop reason: SDUPTHER

## 2021-06-15 ENCOUNTER — LAB (OUTPATIENT)
Dept: LAB | Facility: OTHER | Age: 66
End: 2021-06-15

## 2021-06-15 ENCOUNTER — OFFICE VISIT (OUTPATIENT)
Dept: FAMILY MEDICINE CLINIC | Facility: CLINIC | Age: 66
End: 2021-06-15

## 2021-06-15 VITALS
OXYGEN SATURATION: 98 % | SYSTOLIC BLOOD PRESSURE: 120 MMHG | WEIGHT: 127 LBS | BODY MASS INDEX: 18.81 KG/M2 | DIASTOLIC BLOOD PRESSURE: 68 MMHG | RESPIRATION RATE: 16 BRPM | TEMPERATURE: 98 F | HEART RATE: 84 BPM | HEIGHT: 69 IN

## 2021-06-15 DIAGNOSIS — J44.9 CHRONIC BRONCHITIS WITH COPD (CHRONIC OBSTRUCTIVE PULMONARY DISEASE) (HCC): ICD-10-CM

## 2021-06-15 DIAGNOSIS — J96.12 CHRONIC RESPIRATORY FAILURE WITH HYPERCAPNIA (HCC): ICD-10-CM

## 2021-06-15 DIAGNOSIS — R06.00 DYSPNEA, UNSPECIFIED TYPE: ICD-10-CM

## 2021-06-15 DIAGNOSIS — D50.9 IRON DEFICIENCY ANEMIA, UNSPECIFIED IRON DEFICIENCY ANEMIA TYPE: ICD-10-CM

## 2021-06-15 DIAGNOSIS — J44.9 COPD, SEVERE (HCC): ICD-10-CM

## 2021-06-15 DIAGNOSIS — J44.1 ACUTE EXACERBATION OF CHRONIC OBSTRUCTIVE PULMONARY DISEASE (COPD) (HCC): Primary | ICD-10-CM

## 2021-06-15 DIAGNOSIS — F17.200 TOBACCO DEPENDENCE SYNDROME: ICD-10-CM

## 2021-06-15 DIAGNOSIS — Z99.81 REQUIRES SUPPLEMENTAL OXYGEN: ICD-10-CM

## 2021-06-15 LAB
ALBUMIN SERPL-MCNC: 4.3 G/DL (ref 3.5–5)
ALBUMIN/GLOB SERPL: 1.5 G/DL (ref 1.1–1.8)
ALP SERPL-CCNC: 97 U/L (ref 38–126)
ALT SERPL W P-5'-P-CCNC: 23 U/L
ANION GAP SERPL CALCULATED.3IONS-SCNC: 5 MMOL/L (ref 5–15)
AST SERPL-CCNC: 27 U/L (ref 17–59)
BASOPHILS # BLD AUTO: 0.04 10*3/MM3 (ref 0–0.2)
BASOPHILS NFR BLD AUTO: 0.5 % (ref 0–1.5)
BILIRUB SERPL-MCNC: 0.6 MG/DL (ref 0.2–1.3)
BUN SERPL-MCNC: 9 MG/DL (ref 7–23)
BUN/CREAT SERPL: 18.4 (ref 7–25)
CALCIUM SPEC-SCNC: 10.1 MG/DL (ref 8.4–10.2)
CHLORIDE SERPL-SCNC: 96 MMOL/L (ref 101–112)
CO2 SERPL-SCNC: 37 MMOL/L (ref 22–30)
CREAT SERPL-MCNC: 0.49 MG/DL (ref 0.7–1.3)
DEPRECATED RDW RBC AUTO: 43.2 FL (ref 37–54)
EOSINOPHIL # BLD AUTO: 0.15 10*3/MM3 (ref 0–0.4)
EOSINOPHIL NFR BLD AUTO: 2 % (ref 0.3–6.2)
ERYTHROCYTE [DISTWIDTH] IN BLOOD BY AUTOMATED COUNT: 12.8 % (ref 12.3–15.4)
GFR SERPL CREATININE-BSD FRML MDRD: 169 ML/MIN/1.73 (ref 49–113)
GLOBULIN UR ELPH-MCNC: 2.9 GM/DL (ref 2.3–3.5)
GLUCOSE SERPL-MCNC: 111 MG/DL (ref 70–99)
HCT VFR BLD AUTO: 43.5 % (ref 37.5–51)
HGB BLD-MCNC: 13.6 G/DL (ref 13–17.7)
LYMPHOCYTES # BLD AUTO: 1.16 10*3/MM3 (ref 0.7–3.1)
LYMPHOCYTES NFR BLD AUTO: 15.8 % (ref 19.6–45.3)
MCH RBC QN AUTO: 29.7 PG (ref 26.6–33)
MCHC RBC AUTO-ENTMCNC: 31.3 G/DL (ref 31.5–35.7)
MCV RBC AUTO: 95 FL (ref 79–97)
MONOCYTES # BLD AUTO: 0.59 10*3/MM3 (ref 0.1–0.9)
MONOCYTES NFR BLD AUTO: 8.1 % (ref 5–12)
NEUTROPHILS NFR BLD AUTO: 5.38 10*3/MM3 (ref 1.7–7)
NEUTROPHILS NFR BLD AUTO: 73.6 % (ref 42.7–76)
PLATELET # BLD AUTO: 171 10*3/MM3 (ref 140–450)
PMV BLD AUTO: 10 FL (ref 6–12)
POTASSIUM SERPL-SCNC: 4 MMOL/L (ref 3.4–5)
PROT SERPL-MCNC: 7.2 G/DL (ref 6.3–8.6)
RBC # BLD AUTO: 4.58 10*6/MM3 (ref 4.14–5.8)
SODIUM SERPL-SCNC: 138 MMOL/L (ref 137–145)
WBC # BLD AUTO: 7.32 10*3/MM3 (ref 3.4–10.8)

## 2021-06-15 PROCEDURE — 87070 CULTURE OTHR SPECIMN AEROBIC: CPT | Performed by: PHYSICIAN ASSISTANT

## 2021-06-15 PROCEDURE — 96372 THER/PROPH/DIAG INJ SC/IM: CPT | Performed by: PHYSICIAN ASSISTANT

## 2021-06-15 PROCEDURE — 36415 COLL VENOUS BLD VENIPUNCTURE: CPT | Performed by: PHYSICIAN ASSISTANT

## 2021-06-15 PROCEDURE — 84466 ASSAY OF TRANSFERRIN: CPT | Performed by: PHYSICIAN ASSISTANT

## 2021-06-15 PROCEDURE — 99213 OFFICE O/P EST LOW 20 MIN: CPT | Performed by: PHYSICIAN ASSISTANT

## 2021-06-15 PROCEDURE — 87205 SMEAR GRAM STAIN: CPT | Performed by: PHYSICIAN ASSISTANT

## 2021-06-15 PROCEDURE — 82746 ASSAY OF FOLIC ACID SERUM: CPT | Performed by: PHYSICIAN ASSISTANT

## 2021-06-15 PROCEDURE — 82607 VITAMIN B-12: CPT | Performed by: PHYSICIAN ASSISTANT

## 2021-06-15 PROCEDURE — 83540 ASSAY OF IRON: CPT | Performed by: PHYSICIAN ASSISTANT

## 2021-06-15 PROCEDURE — 80053 COMPREHEN METABOLIC PANEL: CPT | Performed by: PHYSICIAN ASSISTANT

## 2021-06-15 PROCEDURE — 85025 COMPLETE CBC W/AUTO DIFF WBC: CPT | Performed by: PHYSICIAN ASSISTANT

## 2021-06-15 RX ORDER — FAMOTIDINE 20 MG/1
20 TABLET, FILM COATED ORAL EVERY MORNING
COMMUNITY
Start: 2021-05-28

## 2021-06-15 RX ORDER — METHYLPREDNISOLONE SODIUM SUCCINATE 125 MG/2ML
80 INJECTION, POWDER, LYOPHILIZED, FOR SOLUTION INTRAMUSCULAR; INTRAVENOUS ONCE
Status: COMPLETED | OUTPATIENT
Start: 2021-06-15 | End: 2021-06-15

## 2021-06-15 RX ORDER — PREDNISONE 10 MG/1
TABLET ORAL
Qty: 48 TABLET | Refills: 0 | Status: SHIPPED | OUTPATIENT
Start: 2021-06-15 | End: 2021-08-30

## 2021-06-15 RX ORDER — CEFTRIAXONE 1 G/1
1 INJECTION, POWDER, FOR SOLUTION INTRAMUSCULAR; INTRAVENOUS ONCE
Status: COMPLETED | OUTPATIENT
Start: 2021-06-15 | End: 2021-06-15

## 2021-06-15 RX ADMIN — CEFTRIAXONE 1 G: 1 INJECTION, POWDER, FOR SOLUTION INTRAMUSCULAR; INTRAVENOUS at 12:55

## 2021-06-15 RX ADMIN — METHYLPREDNISOLONE SODIUM SUCCINATE 80 MG: 125 INJECTION, POWDER, LYOPHILIZED, FOR SOLUTION INTRAMUSCULAR; INTRAVENOUS at 12:59

## 2021-06-15 NOTE — PROGRESS NOTES
Subjective   Kermit Corley is a 66 y.o. male.     History of Present Illness     Patient presents today for a hospital follow up encounter.     Admission date: 5/20/21  Discharge date: 5/28/21  [facility] Long term care: 5/28/21-6/3/21    Admission diagnoses: COPD with acute exacerbation    Discharge diagnoses: advanced copd exacerbation, acute bronchitis, sputum + for multi-drug resistant pseudomonas, acute on chronic respiratory failure    Patient presented to [facility] ER on 5/20/21 with 3 week history of dyspnea, significantly worsened over the past few days with increased cough and occasional thick sputum production. Patient was admitted and started on Rocephin, Zithromax and Solumedrol. Sputum culture grew a multi drug resistant pseudomonas. He was started on inhaled Tobramycin BID and the Rocephin was changed to Fortaz. All the the antibiotics which cover the pseudomonas were IV 3 times a day. Patient was transferred to [facility] to complete the IV Fortaz and continue the inhaled Tobramycin. CXR: COPD but no infiltrates    6/15/21: Patient with hx of end-stage emphysema and copd, chronic severe hypoxemia requiring continuous home oxygen therapy. Recent hospitalization for acute exacerbation of copd, acute bronchitis. He reports initial 3 days after discharge was feeling better, however reports symptoms worsened in the past 7-10 days. He admits to increased dyspnea from baseline, wheezing, pleuritic chest tightness, productive cough of purulent sputum. He continues to wear supplemental oxygen 2 L/min via nasal cannula and has had to increase 2.5L/min over the last week. Reports mild relief in dyspnea with increased oxygen. He reports recent negative covid-19 testing during hospitalization. He denies chest pain, fever, chills, abdominal pain. He does admit to decreased I&O, he reports drinking 2-3 boost per day. He does admit to headache. He denies known sick contacts or known exposure to covid-19. He  admits to generalized weakness. Follow up appt with pulmonology scheduled for 6/22/21.    The following portions of the patient's history were reviewed and updated as appropriate: allergies, current medications, past family history, past medical history, past social history, past surgical history and problem list.    Review of Systems   Constitutional: Positive for appetite change (decreased) and fatigue. Negative for chills, diaphoresis, fever, unexpected weight gain and unexpected weight loss.   HENT: Positive for congestion, postnasal drip and rhinorrhea. Negative for dental problem, drooling, ear discharge, ear pain, facial swelling, hearing loss, mouth sores, nosebleeds, sinus pressure, sneezing, sore throat, swollen glands, tinnitus, trouble swallowing and voice change.    Eyes: Negative for blurred vision, double vision, pain and visual disturbance.   Respiratory: Positive for cough, chest tightness (pleuritic), shortness of breath and wheezing (worse than baseline). Negative for apnea, choking and stridor.    Cardiovascular: Negative for chest pain, palpitations and leg swelling.   Gastrointestinal: Negative for abdominal distention, abdominal pain, constipation, diarrhea, nausea, vomiting, GERD and indigestion.   Endocrine: Negative.    Genitourinary: Negative for dysuria.   Musculoskeletal: Negative for myalgias and neck pain.   Skin: Negative.  Negative for rash.   Neurological: Positive for weakness and headache. Negative for dizziness and confusion.   Psychiatric/Behavioral: Negative.        Objective   Physical Exam  Vitals and nursing note reviewed.   Constitutional:       General: He is not in acute distress.     Appearance: Normal appearance. He is ill-appearing. He is not toxic-appearing or diaphoretic.   HENT:      Head: Normocephalic and atraumatic.      Right Ear: External ear normal. There is impacted cerumen.      Left Ear: External ear normal. There is impacted cerumen.      Nose: Congestion  and rhinorrhea present.      Mouth/Throat:      Mouth: Mucous membranes are moist.      Pharynx: Oropharynx is clear. Posterior oropharyngeal erythema present. No oropharyngeal exudate.   Eyes:      Extraocular Movements: Extraocular movements intact.      Conjunctiva/sclera: Conjunctivae normal.   Cardiovascular:      Rate and Rhythm: Normal rate and regular rhythm.      Pulses: Normal pulses.      Heart sounds: Normal heart sounds. No murmur heard.   No friction rub. No gallop.    Pulmonary:      Effort: Pulmonary effort is normal. No respiratory distress.      Breath sounds: Decreased air movement present. No stridor. Decreased breath sounds and wheezing present. No rhonchi or rales.   Chest:      Chest wall: No tenderness.   Abdominal:      General: Bowel sounds are normal.      Palpations: Abdomen is soft.   Musculoskeletal:      Cervical back: Normal range of motion.      Right lower leg: No edema.      Left lower leg: No edema.   Skin:     General: Skin is warm and dry.      Findings: No rash.   Neurological:      Mental Status: He is alert and oriented to person, place, and time. Mental status is at baseline.   Psychiatric:         Mood and Affect: Mood normal.         Behavior: Behavior normal. Behavior is cooperative.         Thought Content: Thought content normal.         Judgment: Judgment normal.         Vitals:    06/15/21 0945   BP: 120/68   Pulse: 84   Resp: 16   Temp: 98 °F (36.7 °C)   SpO2: 98%        Assessment/Plan   Diagnoses and all orders for this visit:    1. Acute exacerbation of chronic obstructive pulmonary disease (COPD) (CMS/[facility]) (Primary)  -     methylPREDNISolone sodium succinate (SOLU-Medrol) injection 80 mg  -     predniSONE (DELTASONE) 10 MG (48) dose pack; Take as directed on package instructions.  Dispense: 48 tablet; Refill: 0  -     cefTRIAXone (ROCEPHIN) injection 1 g    2. Dyspnea, unspecified type  -     CBC w AUTO Differential; Future  -     Comprehensive metabolic panel;  Future  -     XR Chest 2 View; Future  -     Respiratory Culture - Sputum, Cough; Future    3. COPD, severe (CMS/HCC)    4. Chronic bronchitis with COPD (chronic obstructive pulmonary disease) (CMS/HCC)    5. Chronic respiratory failure with hypercapnia (CMS/[facility])    6. Requires supplemental oxygen    7. Tobacco dependence syndrome    Other orders  -     Saccharomyces boulardii 500 MG pack; Take 1 each by mouth 2 (two) times a day for 30 days.  Dispense: 60 each; Refill: 0      Hospital discharge f/u, acute copd exacerbation - initial improvement then worsening of symptoms. CXR completed today with no acute process. Plan to repeat cbc, cmp, and repeat sputum culture today to determine if clearing of pseudomonal infection. Will await results of sputum culture for antibiotic therapy due to hx of multi-drug resistant pseudomonas infection. Sputum culture completed today.  Advised to continue bronchodilators as prescribed. Rocephin IM and solu-medrol IM injection as above given to pt in office. Begin prednisone taper pack as prescribed. Continue 2L O2 continuous oxygen supplementation. Advised to increase fluid intake & maintain good hand hygiene. Continue mucinex otc as directed. Advised to alternate tylenol/ibuprofen otc q 4-6 hours prn for mild pain/fever. Follow up with pulmonology as scheduled or sooner if needed.    Advised pt to immediately go to ER if he develops worsening shortness of breath, wheezing, fever, chills, dehydration, coughing up blood, and discussed other concerning s/s.     Advised pt to sleep with head of bed raised. Advised pt to quit smoking. I advised Kermit of the risks of continuing to use tobacco. During this visit, I spent <3 minutes counseling the patient regarding tobacco cessation. Pt declines interest in tobacco cessation.     Patient educated to follow up in 3 days or sooner than next scheduled appointment if symptoms worsen or do not improve. Patient stated understanding and has  agreed with plan of care. After visit summary was printed and given to patient.      This document has been electronically signed by Anjali Reyes PA-C on Sandee 15, 2021 12:38 CDT,.

## 2021-06-16 LAB
FOLATE SERPL-MCNC: 13.6 NG/ML (ref 4.78–24.2)
IRON 24H UR-MRATE: 77 MCG/DL (ref 59–158)
IRON SATN MFR SERPL: 19 % (ref 20–50)
TIBC SERPL-MCNC: 416 MCG/DL (ref 298–536)
TRANSFERRIN SERPL-MCNC: 279 MG/DL (ref 200–360)
VIT B12 BLD-MCNC: 514 PG/ML (ref 211–946)

## 2021-06-18 ENCOUNTER — OFFICE VISIT (OUTPATIENT)
Dept: FAMILY MEDICINE CLINIC | Facility: CLINIC | Age: 66
End: 2021-06-18

## 2021-06-18 VITALS
DIASTOLIC BLOOD PRESSURE: 68 MMHG | HEIGHT: 69 IN | SYSTOLIC BLOOD PRESSURE: 118 MMHG | HEART RATE: 96 BPM | RESPIRATION RATE: 18 BRPM | TEMPERATURE: 98 F | BODY MASS INDEX: 18.81 KG/M2 | WEIGHT: 127 LBS

## 2021-06-18 DIAGNOSIS — J44.9 COPD, SEVERE (HCC): Primary | ICD-10-CM

## 2021-06-18 DIAGNOSIS — J44.1 ACUTE EXACERBATION OF CHRONIC OBSTRUCTIVE PULMONARY DISEASE (COPD) (HCC): ICD-10-CM

## 2021-06-18 DIAGNOSIS — J96.12 CHRONIC RESPIRATORY FAILURE WITH HYPERCAPNIA (HCC): ICD-10-CM

## 2021-06-18 DIAGNOSIS — J44.9 CHRONIC BRONCHITIS WITH COPD (CHRONIC OBSTRUCTIVE PULMONARY DISEASE) (HCC): ICD-10-CM

## 2021-06-18 DIAGNOSIS — F17.200 TOBACCO DEPENDENCE SYNDROME: ICD-10-CM

## 2021-06-18 DIAGNOSIS — R06.00 DYSPNEA, UNSPECIFIED TYPE: ICD-10-CM

## 2021-06-18 DIAGNOSIS — Z99.81 REQUIRES SUPPLEMENTAL OXYGEN: ICD-10-CM

## 2021-06-18 PROCEDURE — 99213 OFFICE O/P EST LOW 20 MIN: CPT | Performed by: PHYSICIAN ASSISTANT

## 2021-06-18 RX ORDER — NICOTINE 21 MG/24HR
1 PATCH, TRANSDERMAL 24 HOURS TRANSDERMAL EVERY 24 HOURS
Qty: 28 EACH | Refills: 1 | Status: SHIPPED | OUTPATIENT
Start: 2021-06-18 | End: 2021-08-30

## 2021-06-18 RX ORDER — BUDESONIDE 0.5 MG/2ML
INHALANT ORAL
Qty: 2 ML | Refills: 0 | Status: SHIPPED | OUTPATIENT
Start: 2021-06-18 | End: 2021-08-30

## 2021-06-18 NOTE — PROGRESS NOTES
Subjective   Kermit Corley is a 66 y.o. male.     History of Present Illness     Patient presents today for a 3 day follow up on acute copd exacerbation and recent hospitalization.     Patient presented to [facility] ER on 5/20/21 with 3 week history of dyspnea, significantly worsened over the past few days with increased cough and occasional thick sputum production. Patient was admitted and started on Rocephin, Zithromax and Solumedrol. Sputum culture grew a multi drug resistant pseudomonas. He was started on inhaled Tobramycin BID and the Rocephin was changed to Fortaz. All the the antibiotics which cover the pseudomonas were IV 3 times a day. Patient was transferred to [facility] to complete the IV Fortaz and continue the inhaled Tobramycin. CXR: COPD but no infiltrates     6/15/21: Patient with hx of end-stage emphysema and copd, chronic severe hypoxemia requiring continuous home oxygen therapy. Recent hospitalization for acute exacerbation of copd, acute bronchitis. He reports initial 3 days after discharge was feeling better, however reports symptoms worsened in the past 7-10 days. He admits to increased dyspnea from baseline, wheezing, pleuritic chest tightness, productive cough of purulent sputum. He continues to wear supplemental oxygen 2 L/min via nasal cannula and has had to increase 2.5L/min over the last week. Reports mild relief in dyspnea with increased oxygen. He reports recent negative covid-19 testing during hospitalization. He denies chest pain, fever, chills, abdominal pain. He does admit to decreased I&O, he reports drinking 2-3 boost per day. He does admit to headache. He denies known sick contacts or known exposure to covid-19. He admits to generalized weakness. Follow up appt with pulmonology scheduled for 6/22/21.    6/18/21: Patient reports continuing to require home oxygen therapy at 2.5L (increase from 2L). He subjectively admits to feeling 40-50% improved. Recent respiratory culte  from 6/15/21 with normal respiratory ashia, s aureus/mrsa or pseudomonas. He admits to increased dyspnea from baseline (mild improvement from 3 days prior per pt report), wheezing, pleuritic chest tightness (mild improvement per pt report). He admits to productive cough of purulent sputum. recent negative covid-19 testing during hospitalization. He denies chest pain, fever, chills, abdominal pain. He reports eating 2 good meals per day, using boost/ensure 2 per day, and snacks prn. He denies headache. He denies known sick contacts or known exposure to covid-19. He admits to mild improvement in generalized weakness. Follow up appt with pulmonology scheduled for 6/22/21. Recent cxr 6/15/21 with no acute process. He reports continuing to smoke approximately 5 cigarettes daily.     The following portions of the patient's history were reviewed and updated as appropriate: allergies, current medications, past family history, past medical history, past social history, past surgical history and problem list.    Review of Systems   Constitutional: Positive for fatigue. Negative for appetite change, chills, diaphoresis, fever, unexpected weight gain and unexpected weight loss.   HENT: Positive for congestion, postnasal drip and rhinorrhea. Negative for dental problem, drooling, ear discharge, ear pain, facial swelling, hearing loss, mouth sores, nosebleeds, sinus pressure, sneezing, sore throat, swollen glands, tinnitus, trouble swallowing and voice change.    Eyes: Negative for blurred vision, double vision, pain and visual disturbance.   Respiratory: Positive for cough, chest tightness (pleuritic, improving), shortness of breath and wheezing (worse than baseline). Negative for apnea, choking and stridor.    Cardiovascular: Negative for chest pain, palpitations and leg swelling.   Gastrointestinal: Negative for abdominal distention, abdominal pain, constipation, diarrhea, nausea, vomiting, GERD and indigestion.   Endocrine:  Negative.    Genitourinary: Negative for dysuria.   Musculoskeletal: Negative for myalgias and neck pain.   Skin: Negative.  Negative for rash.   Neurological: Positive for weakness. Negative for dizziness, headache and confusion.   Psychiatric/Behavioral: Negative.        Objective   Physical Exam  Vitals and nursing note reviewed.   Constitutional:       General: He is not in acute distress.     Appearance: Normal appearance. He is ill-appearing. He is not toxic-appearing or diaphoretic.   HENT:      Head: Normocephalic and atraumatic.      Right Ear: External ear normal. There is impacted cerumen.      Left Ear: External ear normal. There is impacted cerumen.      Nose: Congestion and rhinorrhea present.      Mouth/Throat:      Mouth: Mucous membranes are moist.      Pharynx: Oropharynx is clear. Posterior oropharyngeal erythema present. No oropharyngeal exudate.   Eyes:      Extraocular Movements: Extraocular movements intact.      Conjunctiva/sclera: Conjunctivae normal.   Cardiovascular:      Rate and Rhythm: Normal rate and regular rhythm.      Pulses: Normal pulses.      Heart sounds: Normal heart sounds. No murmur heard.   No friction rub. No gallop.    Pulmonary:      Effort: Pulmonary effort is normal. No respiratory distress.      Breath sounds: Decreased air movement present. No stridor. Decreased breath sounds and wheezing present. No rhonchi or rales.   Chest:      Chest wall: No tenderness.   Abdominal:      General: Bowel sounds are normal.      Palpations: Abdomen is soft.   Musculoskeletal:      Cervical back: Normal range of motion.      Right lower leg: No edema.      Left lower leg: No edema.   Skin:     General: Skin is warm and dry.      Findings: No rash.   Neurological:      Mental Status: He is alert and oriented to person, place, and time. Mental status is at baseline.   Psychiatric:         Mood and Affect: Mood normal.         Behavior: Behavior normal. Behavior is cooperative.          Thought Content: Thought content normal.         Judgment: Judgment normal.         Vitals:    06/18/21 0930   BP: 118/68   Pulse: 96   Resp: 18   Temp: 98 °F (36.7 °C)        Assessment/Plan   Diagnoses and all orders for this visit:    1. COPD, severe (CMS/HCC) (Primary)  -     budesonide (Pulmicort) 0.5 MG/2ML nebulizer solution; Take 2ml by nebulization BID x 1 week. Week 2 take 2ml by nebulization daily.  Dispense: 2 mL; Refill: 0    2. Acute exacerbation of chronic obstructive pulmonary disease (COPD) (CMS/HCC)  -     budesonide (Pulmicort) 0.5 MG/2ML nebulizer solution; Take 2ml by nebulization BID x 1 week. Week 2 take 2ml by nebulization daily.  Dispense: 2 mL; Refill: 0    3. Dyspnea, unspecified type  -     budesonide (Pulmicort) 0.5 MG/2ML nebulizer solution; Take 2ml by nebulization BID x 1 week. Week 2 take 2ml by nebulization daily.  Dispense: 2 mL; Refill: 0    4. Chronic respiratory failure with hypercapnia (CMS/HCC)  -     budesonide (Pulmicort) 0.5 MG/2ML nebulizer solution; Take 2ml by nebulization BID x 1 week. Week 2 take 2ml by nebulization daily.  Dispense: 2 mL; Refill: 0    5. Requires supplemental oxygen  -     budesonide (Pulmicort) 0.5 MG/2ML nebulizer solution; Take 2ml by nebulization BID x 1 week. Week 2 take 2ml by nebulization daily.  Dispense: 2 mL; Refill: 0    6. Chronic bronchitis with COPD (chronic obstructive pulmonary disease) (CMS/HCC)  -     budesonide (Pulmicort) 0.5 MG/2ML nebulizer solution; Take 2ml by nebulization BID x 1 week. Week 2 take 2ml by nebulization daily.  Dispense: 2 mL; Refill: 0    7. Tobacco dependence syndrome  -     nicotine (Nicoderm CQ) 21 MG/24HR patch; Place 1 patch on the skin as directed by provider Daily.  Dispense: 28 each; Refill: 1      Severe copd with acute exacerbation - mild improvement. Recent cxr with no acute process. Recent sputum culture  6/15/21 with normal respiratory ashia, s aureus/mrsa or pseudomonas. Advised to continue  bronchodilators as prescribed. Continue prednisone taper until completion. Begin budesonide neb bid x 1 week then once weekly x 1 week. Continue 2.5L O2 continuous oxygen supplementation. Advised to increase fluid intake & maintain good hand hygiene. Continue mucinex otc as directed. Advised to alternate tylenol/ibuprofen otc q 4-6 hours prn for mild pain/fever. Follow up with pulmonology as scheduled or sooner if needed. Encouraged strict tobacco cessation.     Kermit Contrerasanan  reports that he has been smoking cigarettes. He started smoking about 54 years ago. He has a 15.00 pack-year smoking history. He has never used smokeless tobacco.. I have educated him on the risk of diseases from using tobacco products such as cancer, COPD and heart disease.     I advised him to quit and he is willing to quit. We have discussed the following method/s for tobacco cessation:  Education Material Counseling [place] OTC Cessation Products.  Together we have set a quit date for 1 month from today.  He will follow up with me in 1 month or sooner to check on his progress.  I spent 4 minutes counseling the patient.    Advised pt to immediately go to ER if he develops worsening shortness of breath, wheezing, fever, chills, dehydration, coughing up blood, and discussed other concerning s/s.     Advised pt to sleep with head of bed raised. Advised pt to quit smoking. I advised Kermit of the risks of continuing to use tobacco. During this visit, I spent <3 minutes counseling the patient regarding tobacco cessation. Pt declines interest in tobacco cessation.      Patient educated to follow up in 1 week or sooner than next scheduled appointment if symptoms worsen or do not improve. Patient stated understanding and has agreed with plan of care. After visit summary was printed and given to patient.             This document has been electronically signed by Anjali Reyes PA-C on June 18, 2021 12:00 CDT,.

## 2021-06-19 LAB
BACTERIA SPEC RESP CULT: NORMAL
GRAM STN SPEC: NORMAL

## 2021-06-22 DIAGNOSIS — J44.9 COPD, SEVERE (HCC): ICD-10-CM

## 2021-06-22 DIAGNOSIS — J44.9 CHRONIC BRONCHITIS WITH COPD (CHRONIC OBSTRUCTIVE PULMONARY DISEASE) (HCC): ICD-10-CM

## 2021-06-24 RX ORDER — IPRATROPIUM BROMIDE AND ALBUTEROL SULFATE 2.5; .5 MG/3ML; MG/3ML
SOLUTION RESPIRATORY (INHALATION)
Qty: 360 ML | Refills: 0 | Status: SHIPPED | OUTPATIENT
Start: 2021-06-24 | End: 2021-07-26

## 2021-06-25 ENCOUNTER — OFFICE VISIT (OUTPATIENT)
Dept: FAMILY MEDICINE CLINIC | Facility: CLINIC | Age: 66
End: 2021-06-25

## 2021-06-25 VITALS
HEIGHT: 69 IN | TEMPERATURE: 98 F | RESPIRATION RATE: 18 BRPM | SYSTOLIC BLOOD PRESSURE: 130 MMHG | DIASTOLIC BLOOD PRESSURE: 72 MMHG | WEIGHT: 129 LBS | BODY MASS INDEX: 19.11 KG/M2 | HEART RATE: 88 BPM | OXYGEN SATURATION: 98 %

## 2021-06-25 DIAGNOSIS — Z99.81 REQUIRES SUPPLEMENTAL OXYGEN: ICD-10-CM

## 2021-06-25 DIAGNOSIS — F17.210 NICOTINE DEPENDENCE, CIGARETTES, UNCOMPLICATED: ICD-10-CM

## 2021-06-25 DIAGNOSIS — J44.9 COPD, SEVERE (HCC): Primary | ICD-10-CM

## 2021-06-25 DIAGNOSIS — J44.9 CHRONIC BRONCHITIS WITH COPD (CHRONIC OBSTRUCTIVE PULMONARY DISEASE) (HCC): ICD-10-CM

## 2021-06-25 DIAGNOSIS — F17.200 TOBACCO DEPENDENCE SYNDROME: ICD-10-CM

## 2021-06-25 DIAGNOSIS — J96.12 CHRONIC RESPIRATORY FAILURE WITH HYPERCAPNIA (HCC): ICD-10-CM

## 2021-06-25 DIAGNOSIS — Z12.2 ENCOUNTER FOR SCREENING FOR LUNG CANCER: ICD-10-CM

## 2021-06-25 DIAGNOSIS — J44.1 ACUTE EXACERBATION OF CHRONIC OBSTRUCTIVE PULMONARY DISEASE (COPD) (HCC): ICD-10-CM

## 2021-06-25 DIAGNOSIS — R06.00 DYSPNEA, UNSPECIFIED TYPE: ICD-10-CM

## 2021-06-25 PROCEDURE — 99213 OFFICE O/P EST LOW 20 MIN: CPT | Performed by: PHYSICIAN ASSISTANT

## 2021-06-25 RX ORDER — BUDESONIDE AND FORMOTEROL FUMARATE DIHYDRATE 160; 4.5 UG/1; UG/1
2 AEROSOL RESPIRATORY (INHALATION)
Qty: 10.2 G | Refills: 12 | Status: SHIPPED | OUTPATIENT
Start: 2021-06-25 | End: 2021-08-30

## 2021-06-25 NOTE — PROGRESS NOTES
Subjective   Kermit Corley is a 66 y.o. male.     History of Present Illness     Patient presents today for a f/u on acute copd exacerbation and recent hospitalization.    Patient presented to [facility] ER on 5/20/21 with 3 week history of dyspnea, significantly worsened over the past few days with increased cough and occasional thick sputum production. Patient was admitted and started on Rocephin, Zithromax and Solumedrol. Sputum culture grew a multi drug resistant pseudomonas. He was started on inhaled Tobramycin BID and the Rocephin was changed to Fortaz. All the the antibiotics which cover the pseudomonas were IV 3 times a day. Patient was transferred to Long Term Care to complete the IV Fortaz and continue the inhaled Tobramycin. CXR: COPD but no infiltrates     6/15/21: Patient with hx of end-stage emphysema and copd, chronic severe hypoxemia requiring continuous home oxygen therapy. Recent hospitalization for acute exacerbation of copd, acute bronchitis. He reports initial 3 days after discharge was feeling better, however reports symptoms worsened in the past 7-10 days. He admits to increased dyspnea from baseline, wheezing, pleuritic chest tightness, productive cough of purulent sputum. He continues to wear supplemental oxygen 2 L/min via nasal cannula and has had to increase 2.5L/min over the last week. Reports mild relief in dyspnea with increased oxygen. He reports recent negative covid-19 testing during hospitalization. He denies chest pain, fever, chills, abdominal pain. He does admit to decreased I&O, he reports drinking 2-3 boost per day. He does admit to headache. He denies known sick contacts or known exposure to covid-19. He admits to generalized weakness. Follow up appt with pulmonology scheduled for 6/22/21.     6/18/21: Patient reports continuing to require home oxygen therapy at 2.5L (increase from 2L). He subjectively admits to feeling 40-50% improved. Recent respiratory culte from  6/15/21 with normal respiratory ashia, s aureus/mrsa or pseudomonas. He admits to increased dyspnea from baseline (mild improvement from 3 days prior per pt report), wheezing, pleuritic chest tightness (mild improvement per pt report). He admits to productive cough of purulent sputum. recent negative covid-19 testing during hospitalization. He denies chest pain, fever, chills, abdominal pain. He reports eating 2 good meals per day, using boost/ensure 2 per day, and snacks prn. He denies headache. He denies known sick contacts or known exposure to covid-19. He admits to mild improvement in generalized weakness. Follow up appt with pulmonology scheduled for 6/22/21. Recent cxr 6/15/21 with no acute process. He reports continuing to smoke approximately 5 cigarettes daily.     6/25/21: Patient reports subjectively feeling 65-70% improved. His recent respiratory culture from 6/15/21 with normal respiratory ashia with no s aureus/mrsa or pseudomonas. He does admit to mild increase in dyspnea from baseline (reports this has improved from 1 week prior). He does admit to productive cough of white sputum. recent negative covid-19 testing during hospitalization. He denies chest pain, fever, chills, abdominal pain, hemoptysis, rhinorrhea, bilateral ear pressure. He reports eating 2 good meals per day, using boost/ensure 2 per day, and snacks prn. He denies known sick contacts or known exposure to covid-19. He does report recent follow up with pulmonology on 6/22/21. He continues to smoke 1/2-1ppd. Hx 1ppd x 45 years. He denies interest in tobacco cessation. Patient also severely hypoxic and requires continuous home oxygen therapy. Patient currently takes 3 inhaler Advair 1 puff twice a day and Incruse once a day. He reports using duo-neb q 4-6 hours PRN. Reports he has not yet picked up budesonide nebs. Last ct low dose lung cancer screen 5/19/20 - noted for no suspicious nodules.     The following portions of the patient's  history were reviewed and updated as appropriate: allergies, current medications, past family history, past medical history, past social history, past surgical history and problem list.    Review of Systems   Constitutional: Positive for fatigue. Negative for appetite change, chills, diaphoresis, fever, unexpected weight gain and unexpected weight loss.   HENT: Negative for congestion, dental problem, drooling, ear discharge, ear pain, facial swelling, hearing loss, mouth sores, nosebleeds, postnasal drip, rhinorrhea, sinus pressure, sneezing, sore throat, swollen glands, tinnitus, trouble swallowing and voice change.    Eyes: Negative for blurred vision, double vision, pain and visual disturbance.   Respiratory: Positive for cough, chest tightness (pleuritic, improving), shortness of breath (improving) and wheezing (worse than baseline). Negative for apnea, choking and stridor.    Cardiovascular: Negative for chest pain, palpitations and leg swelling.   Gastrointestinal: Negative for abdominal distention, abdominal pain, constipation, diarrhea, nausea, vomiting, GERD and indigestion.   Endocrine: Negative.    Genitourinary: Negative for dysuria.   Musculoskeletal: Negative for myalgias and neck pain.   Skin: Negative.  Negative for rash.   Neurological: Positive for weakness. Negative for dizziness, headache and confusion.   Psychiatric/Behavioral: Negative.        Objective   Physical Exam  Vitals and nursing note reviewed.   Constitutional:       General: He is not in acute distress.     Appearance: Normal appearance. He is ill-appearing (chronically). He is not toxic-appearing or diaphoretic.   HENT:      Head: Normocephalic and atraumatic.      Right Ear: External ear normal. There is impacted cerumen.      Left Ear: External ear normal. There is impacted cerumen.      Nose: No congestion or rhinorrhea.      Mouth/Throat:      Mouth: Mucous membranes are moist.      Pharynx: Oropharynx is clear. No  oropharyngeal exudate or posterior oropharyngeal erythema.   Eyes:      Extraocular Movements: Extraocular movements intact.      Conjunctiva/sclera: Conjunctivae normal.   Cardiovascular:      Rate and Rhythm: Normal rate and regular rhythm.      Pulses: Normal pulses.      Heart sounds: Normal heart sounds. No murmur heard.   No friction rub. No gallop.    Pulmonary:      Effort: Pulmonary effort is normal. No respiratory distress.      Breath sounds: Decreased air movement present. No stridor. Decreased breath sounds and wheezing present. No rhonchi or rales.   Chest:      Chest wall: No tenderness.   Abdominal:      General: Bowel sounds are normal.      Palpations: Abdomen is soft.   Musculoskeletal:      Cervical back: Normal range of motion.      Right lower leg: No edema.      Left lower leg: No edema.   Skin:     General: Skin is warm and dry.      Findings: No rash.   Neurological:      Mental Status: He is alert and oriented to person, place, and time. Mental status is at baseline.   Psychiatric:         Mood and Affect: Mood normal.         Behavior: Behavior normal. Behavior is cooperative.         Thought Content: Thought content normal.         Judgment: Judgment normal.         Vitals:    06/25/21 0846   BP: 130/72   Pulse: 88   Resp: 18   Temp: 98 °F (36.7 °C)   SpO2: 98%        Assessment/Plan   Diagnoses and all orders for this visit:    1. COPD, severe (CMS/[facility]) (Primary)  -     budesonide-formoterol (Symbicort) 160-4.5 MCG/ACT inhaler; Inhale 2 puffs 2 (Two) Times a Day.  Dispense: 10.2 g; Refill: 12    2. Chronic bronchitis with COPD (chronic obstructive pulmonary disease) (CMS/[facility])  -     budesonide-formoterol (Symbicort) 160-4.5 MCG/ACT inhaler; Inhale 2 puffs 2 (Two) Times a Day.  Dispense: 10.2 g; Refill: 12    3. Acute exacerbation of chronic obstructive pulmonary disease (COPD) (CMS/[facility])    4. Dyspnea, unspecified type  -     budesonide-formoterol (Symbicort) 160-4.5 MCG/ACT inhaler;  Inhale 2 puffs 2 (Two) Times a Day.  Dispense: 10.2 g; Refill: 12    5. Chronic respiratory failure with hypercapnia (CMS/HCC)  -     budesonide-formoterol (Symbicort) 160-4.5 MCG/ACT inhaler; Inhale 2 puffs 2 (Two) Times a Day.  Dispense: 10.2 g; Refill: 12    6. Requires supplemental oxygen    7. Tobacco dependence syndrome  -      CT Chest Low Dose Cancer Screening WO; Future    8. Nicotine dependence, cigarettes, uncomplicated   -      CT Chest Low Dose Cancer Screening WO; Future    9. Encounter for screening for lung cancer  -      CT Chest Low Dose Cancer Screening WO; Future      Severe copd with acute exacerbation - continued improvement in symptoms. Recent cxr with no acute process. Recent sputum culture 6/15/21 with normal respiratory ashia, no s aureus/mrsa or pseudomonas. Change advair inhaler to symbicort as above to evaluate for improved efficacy. Continue incruse and albuterol inhaler as directed. Continue prednisone taper until completion. Continue supplemental oxygen 24h/day. Advised to increase fluid intake & maintain good hand hygiene. Continue mucinex otc as directed. Advised to alternate tylenol/ibuprofen otc q 4-6 hours prn for mild pain/fever. Follow up with pulmonology as scheduled or sooner if needed. Encouraged strict tobacco cessation.     Kermit Corley  reports that he has been smoking cigarettes. He started smoking about 54 years ago. He has a 15.00 pack-year smoking history. He has never used smokeless tobacco.. I have educated him on the risk of diseases from using tobacco products such as cancer, COPD and heart disease.   I advised him to quit and he is not willing to quit.  I spent 4 minutes counseling the patient.  Lung cancer screening is due, LDCT chest ordered as above.      Advised pt to immediately go to ER if he develops worsening shortness of breath, wheezing, fever, chills, dehydration, coughing up blood, and discussed other concerning s/s.     Advised pt to sleep  with head of bed raised. Advised pt to quit smoking. I advised Kermit of the risks of continuing to use tobacco. During this visit, I spent <3 minutes counseling the patient regarding tobacco cessation. Pt declines interest in tobacco cessation.      Patient educated to follow up in 1 month or sooner than next scheduled appointment if symptoms worsen or do not improve. Patient stated understanding and has agreed with plan of care. After visit summary was printed and given to patient.             This document has been electronically signed by Anjali Reyes PA-C on June 25, 2021 09:48 CDT,.

## 2021-07-09 ENCOUNTER — TELEPHONE (OUTPATIENT)
Dept: FAMILY MEDICINE CLINIC | Facility: CLINIC | Age: 66
End: 2021-07-09

## 2021-07-24 DIAGNOSIS — J44.9 COPD, SEVERE (HCC): ICD-10-CM

## 2021-07-24 DIAGNOSIS — J44.9 CHRONIC BRONCHITIS WITH COPD (CHRONIC OBSTRUCTIVE PULMONARY DISEASE) (HCC): ICD-10-CM

## 2021-07-26 RX ORDER — IPRATROPIUM BROMIDE AND ALBUTEROL SULFATE 2.5; .5 MG/3ML; MG/3ML
SOLUTION RESPIRATORY (INHALATION)
Qty: 360 ML | Refills: 0 | Status: SHIPPED | OUTPATIENT
Start: 2021-07-26 | End: 2021-08-30

## 2021-08-10 DIAGNOSIS — J44.9 COPD, SEVERE (HCC): ICD-10-CM

## 2021-08-13 RX ORDER — MONTELUKAST SODIUM 10 MG/1
TABLET ORAL
Qty: 90 TABLET | Refills: 0 | Status: SHIPPED | OUTPATIENT
Start: 2021-08-13 | End: 2021-08-19

## 2021-08-14 DIAGNOSIS — J44.9 COPD, SEVERE (HCC): ICD-10-CM

## 2021-08-19 RX ORDER — MONTELUKAST SODIUM 10 MG/1
TABLET ORAL
Qty: 90 TABLET | Refills: 0 | Status: SHIPPED | OUTPATIENT
Start: 2021-08-19 | End: 2021-08-30 | Stop reason: SDUPTHER

## 2021-08-30 ENCOUNTER — OFFICE VISIT (OUTPATIENT)
Dept: FAMILY MEDICINE CLINIC | Facility: CLINIC | Age: 66
End: 2021-08-30

## 2021-08-30 VITALS
RESPIRATION RATE: 20 BRPM | SYSTOLIC BLOOD PRESSURE: 100 MMHG | OXYGEN SATURATION: 98 % | HEIGHT: 69 IN | TEMPERATURE: 97.6 F | WEIGHT: 132 LBS | HEART RATE: 85 BPM | DIASTOLIC BLOOD PRESSURE: 60 MMHG | BODY MASS INDEX: 19.55 KG/M2

## 2021-08-30 DIAGNOSIS — Z13.29 SCREENING FOR THYROID DISORDER: ICD-10-CM

## 2021-08-30 DIAGNOSIS — Z91.89 CANDIDATE FOR STATIN THERAPY DUE TO RISK OF FUTURE CARDIOVASCULAR EVENT: ICD-10-CM

## 2021-08-30 DIAGNOSIS — E55.9 VITAMIN D DEFICIENCY: ICD-10-CM

## 2021-08-30 DIAGNOSIS — F51.01 PRIMARY INSOMNIA: ICD-10-CM

## 2021-08-30 DIAGNOSIS — E61.1 IRON DEFICIENCY: ICD-10-CM

## 2021-08-30 DIAGNOSIS — Z13.21 ENCOUNTER FOR SCREENING FOR NUTRITIONAL DISORDER: ICD-10-CM

## 2021-08-30 DIAGNOSIS — J44.9 COPD, SEVERE (HCC): Primary | ICD-10-CM

## 2021-08-30 DIAGNOSIS — R73.01 IMPAIRED FASTING GLUCOSE: ICD-10-CM

## 2021-08-30 DIAGNOSIS — Z13.1 SCREENING FOR DIABETES MELLITUS: ICD-10-CM

## 2021-08-30 DIAGNOSIS — R60.0 PEDAL EDEMA: ICD-10-CM

## 2021-08-30 DIAGNOSIS — N40.1 BENIGN PROSTATIC HYPERPLASIA WITH URINARY FREQUENCY: ICD-10-CM

## 2021-08-30 DIAGNOSIS — F17.200 TOBACCO DEPENDENCE SYNDROME: ICD-10-CM

## 2021-08-30 DIAGNOSIS — R35.0 BENIGN PROSTATIC HYPERPLASIA WITH URINARY FREQUENCY: ICD-10-CM

## 2021-08-30 DIAGNOSIS — F17.210 NICOTINE DEPENDENCE, CIGARETTES, UNCOMPLICATED: ICD-10-CM

## 2021-08-30 DIAGNOSIS — J31.0 CHRONIC RHINITIS: ICD-10-CM

## 2021-08-30 DIAGNOSIS — J96.12 CHRONIC RESPIRATORY FAILURE WITH HYPERCAPNIA (HCC): ICD-10-CM

## 2021-08-30 DIAGNOSIS — E78.00 ELEVATED LDL CHOLESTEROL LEVEL: ICD-10-CM

## 2021-08-30 DIAGNOSIS — Z99.81 REQUIRES SUPPLEMENTAL OXYGEN: ICD-10-CM

## 2021-08-30 PROCEDURE — 99214 OFFICE O/P EST MOD 30 MIN: CPT | Performed by: PHYSICIAN ASSISTANT

## 2021-08-30 RX ORDER — TAMSULOSIN HYDROCHLORIDE 0.4 MG/1
1 CAPSULE ORAL NIGHTLY
Qty: 90 CAPSULE | Refills: 1 | Status: SHIPPED | OUTPATIENT
Start: 2021-08-30

## 2021-08-30 RX ORDER — MONTELUKAST SODIUM 10 MG/1
10 TABLET ORAL NIGHTLY
Qty: 90 TABLET | Refills: 1 | Status: SHIPPED | OUTPATIENT
Start: 2021-08-30

## 2021-08-30 RX ORDER — ATORVASTATIN CALCIUM 10 MG/1
10 TABLET, FILM COATED ORAL DAILY
Qty: 90 TABLET | Refills: 1 | Status: SHIPPED | OUTPATIENT
Start: 2021-08-30

## 2021-08-30 RX ORDER — POTASSIUM CHLORIDE 20 MEQ/1
TABLET, EXTENDED RELEASE ORAL
Qty: 30 TABLET | Refills: 0 | Status: SHIPPED | OUTPATIENT
Start: 2021-08-30

## 2021-08-30 RX ORDER — VARENICLINE TARTRATE 1 MG/1
1 TABLET, FILM COATED ORAL 2 TIMES DAILY
Qty: 56 TABLET | Refills: 1 | Status: SHIPPED | OUTPATIENT
Start: 2021-09-27 | End: 2021-11-22

## 2021-08-30 RX ORDER — FUROSEMIDE 40 MG/1
40 TABLET ORAL DAILY PRN
Qty: 30 TABLET | Refills: 0 | Status: SHIPPED | OUTPATIENT
Start: 2021-08-30

## 2021-08-30 RX ORDER — DOCUSATE SODIUM 100 MG/1
100 CAPSULE, LIQUID FILLED ORAL DAILY PRN
Qty: 30 CAPSULE | Refills: 5 | Status: SHIPPED | OUTPATIENT
Start: 2021-08-30

## 2021-08-30 RX ORDER — AMITRIPTYLINE HYDROCHLORIDE 100 MG/1
100 TABLET, FILM COATED ORAL NIGHTLY
Qty: 90 TABLET | Refills: 1 | Status: SHIPPED | OUTPATIENT
Start: 2021-08-30

## 2021-08-30 RX ORDER — TOBRAMYCIN INHALATION SOLUTION 300 MG/5ML
INHALANT RESPIRATORY (INHALATION)
COMMUNITY
Start: 2021-08-20

## 2021-08-30 RX ORDER — ERGOCALCIFEROL 1.25 MG/1
50000 CAPSULE ORAL WEEKLY
Qty: 12 CAPSULE | Refills: 3 | Status: SHIPPED | OUTPATIENT
Start: 2021-08-30 | End: 2022-08-30

## 2021-08-30 NOTE — PROGRESS NOTES
Subjective   Kermit Corley is a 66 y.o. male.     History of Present Illness     Pt presents today for a hospital follow up encounter.    Admission date: 7/26/21  Discharge date: 8/1/21    Discharge diagnoses: acute on chronic respiratory failure secondary to copd exacerbation, tobacco abuse, hypertension, bph, gerd, hyperlipidemia.    Pt preseneted to [facility] with c/c of increasing shortness of breath, increased sputum production and cough. He was significantly hypercapnic with a respiratory acidosis, pH 7.28, a pCO2 of 75. He was placed on a noninvasive ventilator at the time. He was unable to tolerate BiPAP.  Pulmonology was consulted during admission and he was treated with iv steroids, cefepmine and zpak, and bronchodilators. Discharged with prednisone taper and levaquin. He did improve with treatment.    8/30/21: Patient with hx of end-stage emphysema and copd, chronic severe hypoxemia requiring continuous home oxygen therapy. Recent hospitalization for acute exacerbation of copd, acute bronchitis. He is chronically colonized with pseudomonas.  Recent follow up with pulmonology, Dr. Reynoso on 8/17/21, transitioned to trelegy inhaler, continued albuterol inhaler prn. Started on trilogy home ventilator, started on tobramycin inhalation due to chronic pseudomonas colonization. He reports dyspnea returned to baseline. He admits to occasional productive cough of clear sputum. He denies wheezing. He continues to wear supplemental oxygen 2 L/min via nasal cannula. He denies chest pain, fever, chills, abdominal pain, decreased I&O. He denies known sick contacts or known exposure to covid-19. He does report he recently restarted smoking x 1/2ppd x 1 week.     Tobacco dependence d/t cigarettes - currently smoking 1/2ppd x 1 week. Hx 1ppd x 45 years. Declines nicoderm patches. Agreeable to chantix.     Hx tachyarrhythmias, chest pain, htn - followed by Dr. Waite, Cardiology managed with bystolic, daily 81mg ASA,  nitrostat prn. Denies chest pain, palpitations, dizziness, lightheadedness, syncope/presyncope, peripheral edema.     BPH -well controlled,  managed with flomax. Denies urinary sx.     Insomnia - chronic, well controlled with amitriptyline.      Hyperlipidemia - managed with atorvastatin.      Peripheral edema - chronic, well controlled, managed with lasix daily prn. Reports having to take medication 3-4 days of the month. Echo performed 3/9/20 noted for normal EF (56-60%), normal lef ventricular wall size, thickness, and contractility. Mild MRV, moderate AVR.     The following portions of the patient's history were reviewed and updated as appropriate: allergies, current medications, past family history, past medical history, past social history, past surgical history and problem list.    Review of Systems   Constitutional: Positive for fatigue. Negative for appetite change, chills, diaphoresis, fever, unexpected weight gain and unexpected weight loss.   HENT: Negative for congestion, dental problem, drooling, ear discharge, ear pain, facial swelling, hearing loss, mouth sores, nosebleeds, postnasal drip, rhinorrhea, sinus pressure, sneezing, sore throat, swollen glands, tinnitus, trouble swallowing and voice change.    Eyes: Negative for blurred vision, double vision, pain and visual disturbance.   Respiratory: Positive for cough. Negative for apnea, choking, chest tightness, shortness of breath, wheezing and stridor.    Cardiovascular: Negative for chest pain, palpitations and leg swelling.   Gastrointestinal: Negative for abdominal distention, abdominal pain, constipation, diarrhea, nausea, vomiting, GERD and indigestion.   Endocrine: Negative.    Genitourinary: Negative for dysuria.   Musculoskeletal: Negative for myalgias and neck pain.   Skin: Negative.  Negative for rash.   Neurological: Negative for dizziness, weakness, headache and confusion.   Psychiatric/Behavioral: Negative.        Objective   Physical  Exam  Vitals and nursing note reviewed.   Constitutional:       General: He is not in acute distress.     Appearance: Normal appearance. He is ill-appearing (chronically). He is not toxic-appearing or diaphoretic.   HENT:      Head: Normocephalic and atraumatic.      Right Ear: External ear normal. There is impacted cerumen.      Left Ear: External ear normal. There is impacted cerumen.      Nose: No congestion or rhinorrhea.      Comments: Nasal cannula in place.     Mouth/Throat:      Mouth: Mucous membranes are moist.      Pharynx: Oropharynx is clear. No oropharyngeal exudate or posterior oropharyngeal erythema.   Eyes:      Extraocular Movements: Extraocular movements intact.      Conjunctiva/sclera: Conjunctivae normal.   Cardiovascular:      Rate and Rhythm: Normal rate and regular rhythm.      Pulses: Normal pulses.      Heart sounds: Normal heart sounds. No murmur heard.   No friction rub. No gallop.    Pulmonary:      Effort: Pulmonary effort is normal. No respiratory distress.      Breath sounds: Decreased air movement present. No stridor. No decreased breath sounds, wheezing, rhonchi or rales.   Chest:      Chest wall: No tenderness.   Abdominal:      General: Bowel sounds are normal. There is no distension.      Palpations: Abdomen is soft.      Tenderness: There is no abdominal tenderness. There is no guarding or rebound.   Musculoskeletal:      Cervical back: Normal range of motion.      Right lower leg: No edema.      Left lower leg: No edema.   Skin:     General: Skin is warm and dry.      Findings: No rash.   Neurological:      Mental Status: He is alert and oriented to person, place, and time. Mental status is at baseline.   Psychiatric:         Mood and Affect: Mood normal.         Behavior: Behavior normal. Behavior is cooperative.         Thought Content: Thought content normal.         Judgment: Judgment normal.         Vitals:    08/30/21 0759   BP: 100/60   Pulse: 85   Resp: 20   Temp: 97.6  °F (36.4 °C)   SpO2: 98%      Assessment/Plan   Diagnoses and all orders for this visit:    1. COPD, severe (CMS/HCC) (Primary)  -     CBC w AUTO Differential; Future  -     Comprehensive metabolic panel; Future    2. Chronic respiratory failure with hypercapnia (CMS/HCC)    3. Requires supplemental oxygen    4. Tobacco dependence syndrome    5. Nicotine dependence, cigarettes, uncomplicated  -     varenicline (CHANTIX ELISE) 0.5 MG X 11 & 1 MG X 42 tablet; Take 0.5 mg po daily x 3 days, then 0.5 mg po bid x 4 days, then 1 mg po bid  Dispense: 53 tablet; Refill: 0  -     varenicline (Chantix Continuing Month Elise) 1 MG tablet; Take 1 tablet by mouth 2 (Two) Times a Day for 56 days.  Dispense: 56 tablet; Refill: 1    6. Primary insomnia  -     amitriptyline (ELAVIL) 100 MG tablet; Take 1 tablet by mouth Every Night.  Dispense: 90 tablet; Refill: 1    7. Candidate for statin therapy due to risk of future cardiovascular event  -     atorvastatin (LIPITOR) 10 MG tablet; Take 1 tablet by mouth Daily.  Dispense: 90 tablet; Refill: 1    8. Elevated LDL cholesterol level  -     Lipid panel; Future  -     atorvastatin (LIPITOR) 10 MG tablet; Take 1 tablet by mouth Daily.  Dispense: 90 tablet; Refill: 1    9. Vitamin D deficiency  -     Vitamin D 25 hydroxy; Future  -     ergocalciferol (ERGOCALCIFEROL) 1.25 MG (71367 UT) capsule; Take 1 capsule by mouth 1 (One) Time Per Week.  Dispense: 12 capsule; Refill: 3    10. Pedal edema  -     furosemide (Lasix) 40 MG tablet; Take 1 tablet by mouth Daily As Needed (peripheral edema).  Dispense: 30 tablet; Refill: 0  -     potassium chloride (K-DUR,KLOR-CON) 20 MEQ CR tablet; Take 1 tablet PO with lasix daily prn.  Dispense: 30 tablet; Refill: 0    11. Benign prostatic hyperplasia with urinary frequency  -     tamsulosin (FLOMAX) 0.4 MG capsule 24 hr capsule; Take 1 capsule by mouth Every Night.  Dispense: 90 capsule; Refill: 1    12. Screening for thyroid disorder  -     TSH; Future  -      T4, free; Future    13. Screening for diabetes mellitus    14. Encounter for screening for nutritional disorder  -     Vitamin B12; Future  -     Folate; Future    15. Iron deficiency  -     Vitamin B12; Future  -     Folate; Future  -     Iron Profile; Future    16. Impaired fasting glucose   -     Hemoglobin A1c; Future    17. Chronic rhinitis  -     montelukast (SINGULAIR) 10 MG tablet; Take 1 tablet by mouth Every Night.  Dispense: 90 tablet; Refill: 1    Other orders  -     docusate sodium (COLACE) 100 MG capsule; Take 1 capsule by mouth Daily As Needed for Constipation.  Dispense: 30 capsule; Refill: 5      Baseline labs as above ordered for evaluation and assessment of chronic conditions. Will notify pt of results when received & address appropriately.    COPD, chronic respiratory failure with hypercapnia - managed with albuterol inhaler prn, trelegy inhaler, 2L continuous supplemental oxygen. Following with Dr. Reynoso, pulmonology. Utilizing trilogy home ventilator. Recommended influenza, pneumococcal, and covid-19 immunizations.    Chronic pseudomonas colonization - following with pulmonology, managed with tobramycin 2 inhalations daily 2 weeks on, 2 weeks off schedule. Utilizing oscillating chest vest  therapy 20 minutes in the morning and 20 minutes at night.    Kermit Corley  reports that he has been smoking cigarettes. He started smoking about 54 years ago. He has a 15.00 pack-year smoking history. He has never used smokeless tobacco.. I have educated him on the risk of diseases from using tobacco products such as cancer, COPD and heart disease.     I advised him to quit and he is willing to quit. We have discussed the following method/s for tobacco cessation:  Education Material Counseling [place] OTC Cessation Products Prescription Medicaiton.  Together we have set a quit date for 1 week from today.  He will follow up with me in 3 months or sooner to check on his progress.  I spent 3   minutes counseling the patient.  LDCT chest utd, completed 7/9/21 with no suspicious nodules, severe diffuse emphysema/copd, stable. Recommended continued annual screening.    Hx tachyarrhythmias, chest pain, htn - stable, NAD, asymptomatic. followed by Dr. Waite, Cardiology managed with bystolic, daily 81mg ASA, nitrostat prn.     BPH - managed with flomax     Insomnia - controlled with amitriptyline.      Hyperlipidemia - managed with atorvastatin.      Patient educated to follow up in 3 months or sooner than next scheduled appointment if needed. Patient stated understanding and has agreed with plan of care. After visit summary was printed and given to patient.      This document has been electronically signed by Anjali Reyes PA-C on August 30, 2021 09:28 CDT,.

## 2021-09-09 ENCOUNTER — LAB (OUTPATIENT)
Dept: LAB | Facility: OTHER | Age: 66
End: 2021-09-09

## 2021-09-09 ENCOUNTER — TELEMEDICINE (OUTPATIENT)
Dept: FAMILY MEDICINE CLINIC | Facility: CLINIC | Age: 66
End: 2021-09-09

## 2021-09-09 VITALS — HEIGHT: 69 IN | WEIGHT: 132 LBS | BODY MASS INDEX: 19.55 KG/M2

## 2021-09-09 DIAGNOSIS — R05.9 COUGH: ICD-10-CM

## 2021-09-09 DIAGNOSIS — J44.1 COPD WITH ACUTE EXACERBATION (HCC): Primary | ICD-10-CM

## 2021-09-09 DIAGNOSIS — R09.81 NASAL CONGESTION: ICD-10-CM

## 2021-09-09 LAB
FLUAV AG NPH QL: NEGATIVE
FLUBV AG NPH QL IA: NEGATIVE
SARS-COV-2 N GENE RESP QL NAA+PROBE: NOT DETECTED

## 2021-09-09 PROCEDURE — 87635 SARS-COV-2 COVID-19 AMP PRB: CPT | Performed by: PHYSICIAN ASSISTANT

## 2021-09-09 PROCEDURE — 99442 PR PHYS/QHP TELEPHONE EVALUATION 11-20 MIN: CPT | Performed by: PHYSICIAN ASSISTANT

## 2021-09-09 PROCEDURE — 87804 INFLUENZA ASSAY W/OPTIC: CPT | Performed by: PHYSICIAN ASSISTANT

## 2021-09-09 RX ORDER — GUAIFENESIN 1200 MG/1
1200 TABLET, EXTENDED RELEASE ORAL 2 TIMES DAILY
Qty: 60 EACH | Refills: 0 | Status: SHIPPED | OUTPATIENT
Start: 2021-09-09

## 2021-09-09 RX ORDER — PREDNISONE 20 MG/1
TABLET ORAL
Qty: 20 TABLET | Refills: 0 | Status: SHIPPED | OUTPATIENT
Start: 2021-09-09 | End: 2021-09-23

## 2021-09-09 RX ORDER — BUDESONIDE AND FORMOTEROL FUMARATE DIHYDRATE 160; 4.5 UG/1; UG/1
2 AEROSOL RESPIRATORY (INHALATION) 2 TIMES DAILY
COMMUNITY
Start: 2021-09-02 | End: 2021-09-17 | Stop reason: ALTCHOICE

## 2021-09-09 RX ORDER — LEVOFLOXACIN 500 MG/1
500 TABLET, FILM COATED ORAL DAILY
Qty: 10 TABLET | Refills: 0 | Status: SHIPPED | OUTPATIENT
Start: 2021-09-09 | End: 2021-09-23

## 2021-09-09 RX ORDER — FLUTICASONE PROPIONATE 50 MCG
2 SPRAY, SUSPENSION (ML) NASAL DAILY
Qty: 16 G | Refills: 11 | Status: SHIPPED | OUTPATIENT
Start: 2021-09-09

## 2021-09-09 NOTE — PROGRESS NOTES
Subjective   Kermit Corley is a 66 y.o. male.   You have chosen to receive care through a telephone visit. Do you consent to use a telephone visit for your medical care today? Yes This visit has been rescheduled as a phone visit to comply with patient safety concerns in accordance with CDC recommendations. Total time of discussion was 15 minutes.     COPD  He complains of chest tightness, cough, frequent throat clearing, shortness of breath and wheezing. There is no hemoptysis, hoarse voice or sputum production. This is a new problem. The current episode started in the past 7 days. The problem occurs constantly. The problem has been unchanged. The cough is non-productive. Associated symptoms include dyspnea on exertion, headaches, malaise/fatigue, nasal congestion and a sore throat. Pertinent negatives include no appetite change, chest pain, ear congestion, ear pain, fever, myalgias, orthopnea, PND, postnasal drip, rhinorrhea, sneezing, sweats, trouble swallowing or weight loss. His symptoms are aggravated by nothing. His symptoms are alleviated by beta-agonist. He reports minimal improvement on treatment. There are no known risk factors for lung disease. His past medical history is significant for COPD and emphysema.        Pt presents today via telehealth encounter with a c/c of cough, increased dyspnea from baseline, utilizing 3L continuous supplemental oxygen x 3 days. Pt with hx of end-stage emphysema and copd, chronic severe hypoxemia requiring continuous home oxygen therapy. Utilizing trilogy home ventilator. Utilizing oscillating chest vest therapy 20 mins in AM and 20 mins in PM. Pt utilizing trelegy inhaler, utilizing albuterol inhaler prn. Utilizing albuterol inhaler 3-4 x per day. He was recently started on tobramycin inhalation due to chronic pseudomonas colonization. He denies fever, chills, myalgias. He admits to wheezing, nonproductive cough, chest tightness, nausea, nasal congestion.  Denies vomiting, diarrhea, loss of taste or smell. He denies known sick contacts. He denies known exposure to covid-19.    The following portions of the patient's history were reviewed and updated as appropriate: allergies, current medications, past family history, past medical history, past social history, past surgical history and problem list.    Review of Systems   Constitutional: Positive for fatigue and malaise/fatigue. Negative for appetite change, chills, diaphoresis, fever, unexpected weight gain and unexpected weight loss.   HENT: Positive for congestion and sore throat. Negative for dental problem, drooling, ear discharge, ear pain, facial swelling, hearing loss, hoarse voice, mouth sores, nosebleeds, postnasal drip, rhinorrhea, sinus pressure, sneezing, swollen glands, tinnitus, trouble swallowing and voice change.    Eyes: Negative for blurred vision, double vision, pain and visual disturbance.   Respiratory: Positive for cough, chest tightness (pleuritic), shortness of breath and wheezing. Negative for apnea, hemoptysis, sputum production, choking and stridor.    Cardiovascular: Positive for dyspnea on exertion. Negative for chest pain, palpitations, leg swelling and PND.   Gastrointestinal: Positive for nausea. Negative for abdominal distention, abdominal pain, constipation, diarrhea, vomiting, GERD and indigestion.   Endocrine: Negative.    Genitourinary: Negative for dysuria.   Musculoskeletal: Negative for myalgias and neck pain.   Skin: Negative.  Negative for rash.   Neurological: Positive for headache. Negative for dizziness, weakness and confusion.   Psychiatric/Behavioral: Negative.        Objective   Physical Exam   Constitutional: He is oriented to person, place, and time. No distress.   Cardiovascular: Normal pulse (patient directed exam).      Pulmonary/Chest: No stridor.  No respiratory distress.Use of oxygen by  nasal cannula noted. He Audible wheeze noted...He exhibits no tenderness.    Abdominal: Soft. He exhibits no distension. There is no abdominal tenderness.   Musculoskeletal:         General: No edema.   Lymphadenopathy:     He has no cervical adenopathy.   Neurological: He is alert and oriented to person, place, and time.   Skin: Skin is warm and dry. No rash noted. He is not diaphoretic. No erythema. No pallor.   Psychiatric: He has a normal mood and affect.        PE limited d/t telehealth encounter.      Assessment/Plan   Diagnoses and all orders for this visit:    1. COPD with acute exacerbation (CMS/[facility]) (Primary)  -     levoFLOXacin (LEVAQUIN) 500 MG tablet; Take 1 tablet by mouth Daily.  Dispense: 10 tablet; Refill: 0  -     XR Chest 2 View; Future  -     predniSONE (DELTASONE) 20 MG tablet; Take 3 tablets PO daily x 3 days. Then take 2 tablets PO daily x 3 days. Then take 1 tablet PO daily x 3 days. Then take 1/2 tablet PO daily x 3 days.  Dispense: 20 tablet; Refill: 0  -     guaiFENesin ER (Mucinex Maximum Strength) 1200 MG tablet sustained-release 12 hour; Take 1 tablet by mouth 2 (Two) Times a Day.  Dispense: 60 each; Refill: 0    2. Nasal congestion  -     fluticasone (Flonase) 50 MCG/ACT nasal spray; 2 sprays into the nostril(s) as directed by provider Daily.  Dispense: 16 g; Refill: 11    3. Cough  -     COVID-19, [facility] MAD/PUMA IN-HOUSE, NP SWAB IN TRANSPORT MEDIA 8-10 HR TAT - Swab, Anterior nasal; Future  -     Influenza Antigen, Rapid - , Nasopharynx; Future  -     XR Chest 2 View; Future      Covid-19 and influenza testing to be completed as above. Advised patient to self isolate until results received. Will notify pt of results when received & address appropriately. May use mucinex, flonase, and tylenol otc prn. Advised to maintain adequate hydration and increase fluid intake. Continue supportive measures. Obtain cxr as above, will notify pt of results when received. Begin prednisone taper, levaquin, bronchodilators as prescribed. begin monitoring home oxygen saturations  with pulse ox, to notify office immediately to present for recheck or present to ER for readings <88%. Discussed concerning s/s to immediately present to care or go to ER for evaluation and tx including but not limited to worsening symptoms, dyspnea, chest tightness, chest pain, AMS/confusion, lethargy, abd pain, poor oral intake. Pt verbalized understanding. F/u in 3 days for recheck. Consider sputum culture if no improvement in sx. Continue 3L continuous supplemental oxygen. Advised to increase fluid intake & maintain good hand hygiene.    Patient educated to follow up in 3 days or sooner than next scheduled appointment if symptoms worsen or do not improve. Patient stated understanding and has agreed with plan of care. After visit summary was printed and given to patient.      This document has been electronically signed by Anjali Reyes PA-C on September 9, 2021 14:01 CDT,.

## 2021-09-13 ENCOUNTER — OFFICE VISIT (OUTPATIENT)
Dept: FAMILY MEDICINE CLINIC | Facility: CLINIC | Age: 66
End: 2021-09-13

## 2021-09-13 VITALS — HEIGHT: 69 IN | WEIGHT: 132 LBS | BODY MASS INDEX: 19.55 KG/M2

## 2021-09-13 DIAGNOSIS — J96.12 CHRONIC RESPIRATORY FAILURE WITH HYPERCAPNIA (HCC): ICD-10-CM

## 2021-09-13 DIAGNOSIS — Z99.81 REQUIRES SUPPLEMENTAL OXYGEN: ICD-10-CM

## 2021-09-13 DIAGNOSIS — J44.9 COPD, SEVERE (HCC): ICD-10-CM

## 2021-09-13 DIAGNOSIS — J44.1 COPD WITH ACUTE EXACERBATION (HCC): Primary | ICD-10-CM

## 2021-09-13 DIAGNOSIS — F17.200 TOBACCO DEPENDENCE SYNDROME: ICD-10-CM

## 2021-09-13 PROCEDURE — 99442 PR PHYS/QHP TELEPHONE EVALUATION 11-20 MIN: CPT | Performed by: PHYSICIAN ASSISTANT

## 2021-09-13 NOTE — PROGRESS NOTES
Subjective   Kermit Corley is a 66 y.o. male.   You have chosen to receive care through a telephone visit. Do you consent to use a telephone visit for your medical care today? Yes This visit has been rescheduled as a phone visit to comply with patient safety concerns in accordance with CDC recommendations. Total time of discussion was 13 minutes.     History of Present Illness     Pt presents today via telehealh encounter for f/u on copd exacerbation. He admits to mild improvement in symptoms. He admits to occasional productive cough of white sputum, increased dyspnea from baseline, utilizing 3L continuous supplemental oxygen. He admits to feeling unwell x 7-10 days. Pt with hx of end-stage emphysema and copd, chronic severe hypoxemia requiring continuous home oxygen therapy. Utilizing trilogy home ventilator. Utilizing oscillating chest vest therapy 20 mins in AM and 20 mins in PM. Pt utilizing trelegy inhaler, utilizing albuterol inhaler prn. Utilizing albuterol inhaler 4 x per day. He was recently started on tobramycin inhalation due to chronic pseudomonas colonization. He denies fever, chills, myalgias, nausea, vomiting, loss of taste or smell, diarrhea, hemoptysis. He admits to wheezing, mild pleuritic chest tightness (improving), nasal congestion (clear). Recent covid-19 and influenza testing 9/9/21 negative. CXR ordered on 9/9/21, was unable to complete, he states he will complete tomorrow. Has been taking levaquin, prednisone, mucinex as prescribed.     The following portions of the patient's history were reviewed and updated as appropriate: allergies, current medications, past family history, past medical history, past social history, past surgical history and problem list.    Review of Systems   Constitutional: Positive for fatigue. Negative for appetite change, chills, diaphoresis, fever, unexpected weight gain and unexpected weight loss.   HENT: Positive for congestion. Negative for dental  problem, drooling, ear discharge, ear pain, facial swelling, hearing loss, mouth sores, nosebleeds, postnasal drip, rhinorrhea, sinus pressure, sneezing, sore throat, swollen glands, tinnitus, trouble swallowing and voice change.    Eyes: Negative for blurred vision, double vision, pain and visual disturbance.   Respiratory: Positive for cough, chest tightness (pleuritic, improving), shortness of breath and wheezing. Negative for apnea, choking and stridor.    Cardiovascular: Negative for chest pain, palpitations and leg swelling.   Gastrointestinal: Negative for abdominal distention, abdominal pain, constipation, diarrhea, nausea, vomiting, GERD and indigestion.   Endocrine: Negative.    Genitourinary: Negative for dysuria.   Musculoskeletal: Negative for myalgias and neck pain.   Skin: Negative.  Negative for rash.   Neurological: Negative for dizziness, weakness, headache and confusion.   Psychiatric/Behavioral: Negative.        Objective   Physical Exam   Constitutional: He is oriented to person, place, and time. No distress.   Cardiovascular: Normal pulse (patient directed exam).      Pulmonary/Chest: No stridor.  No respiratory distress.Use of oxygen by  nasal cannula noted. He Audible wheeze noted...He exhibits no tenderness.   Abdominal: Soft. He exhibits no distension. There is no abdominal tenderness.   Musculoskeletal:         General: No edema.   Lymphadenopathy:     He has no cervical adenopathy.   Neurological: He is alert and oriented to person, place, and time.   Skin: Skin is warm and dry. No rash noted. He is not diaphoretic. No erythema. No pallor.   Psychiatric: He has a normal mood and affect.        PE limited d/t telehealth       Assessment/Plan   Diagnoses and all orders for this visit:    1. COPD with acute exacerbation (CMS/HCC) (Primary)  -     Respiratory Culture - Sputum, Cough; Future    2. COPD, severe (CMS/HCC)    3. Chronic respiratory failure with hypercapnia (CMS/HCC)    4.  Requires supplemental oxygen    5. Tobacco dependence syndrome        Acute copd exacerbation - mild improvement in symptoms. May use mucinex, flonase, and tylenol otc prn. Advised to maintain adequate hydration and increase fluid intake. Continue supportive measures. Obtain cxr as ordered on 9/9/21, will notify pt of results when received. Continue prednisone taper, levaquin, bronchodilators as prescribed. begin monitoring home oxygen saturations with pulse ox, to notify office immediately to present for recheck or present to ER for readings <88%.  Discussed concerning s/s to immediately present to care or go to ER for evaluation and tx including but not limited to worsening symptoms, dyspnea, chest tightness, chest pain, AMS/confusion, lethargy, abd pain, poor oral intake. Pt verbalized understanding. Obtain sputum culture as above. Continue 3L continuous supplemental oxygen. Advised to increase fluid intake & maintain good hand hygiene. Advised strict tobacco cessation.     Patient educated to follow up in 3 days or sooner than next scheduled appointment if symptoms worsen or do not improve. Patient stated understanding and has agreed with plan of care. After visit summary was printed and given to patient.      This document has been electronically signed by Anjali Reyes PA-C on September 13, 2021 11:50 CDT,.

## 2021-09-17 ENCOUNTER — TELEMEDICINE (OUTPATIENT)
Dept: FAMILY MEDICINE CLINIC | Facility: CLINIC | Age: 66
End: 2021-09-17

## 2021-09-17 VITALS — BODY MASS INDEX: 19.55 KG/M2 | WEIGHT: 132 LBS | HEIGHT: 69 IN

## 2021-09-17 DIAGNOSIS — F17.200 TOBACCO DEPENDENCE SYNDROME: ICD-10-CM

## 2021-09-17 DIAGNOSIS — J44.9 COPD, SEVERE (HCC): ICD-10-CM

## 2021-09-17 DIAGNOSIS — J96.12 CHRONIC RESPIRATORY FAILURE WITH HYPERCAPNIA (HCC): ICD-10-CM

## 2021-09-17 DIAGNOSIS — Z99.81 REQUIRES SUPPLEMENTAL OXYGEN: ICD-10-CM

## 2021-09-17 DIAGNOSIS — J44.1 COPD WITH ACUTE EXACERBATION (HCC): Primary | ICD-10-CM

## 2021-09-17 PROCEDURE — 99442 PR PHYS/QHP TELEPHONE EVALUATION 11-20 MIN: CPT | Performed by: PHYSICIAN ASSISTANT

## 2021-09-17 NOTE — PROGRESS NOTES
Subjective   Kemrit Corley is a 66 y.o. male.   You have chosen to receive care through a telephone visit. Do you consent to use a telephone visit for your medical care today? Yes This visit has been rescheduled as a phone visit to comply with patient safety concerns in accordance with CDC recommendations. Total time of discussion was 15 minutes.     History of Present Illness     Pt presents today via telehealth encounter for a f/u on copd exacerbation. He admits to mild improvement in dyspnea and cough. He admits to occasional productive cough of white sputum. He does admit to increased dyspnea from baseline. He reports utilizing 3L continuous supplemental oxygen. He admits to feeling unwell x 2 weeks. Pt with hx of end-stage emphysema and copd, chronic severe hypoxemia requiring continuous home oxygen therapy. Utilizing trilogy home ventilator. Utilizing oscillating chest vest therapy 20 mins in AM and 20 mins in PM. Pt utilizing trelegy inhaler, utilizing albuterol inhaler prn. He reports utilizing albuterol inhaler q 4h while awake. He was recently started on tobramycin inhalation due to chronic pseudomonas colonization. He denies fever, chills, myalgias, nausea, vomiting, loss of taste or smell, diarrhea, hemoptysis. He admits to wheezing, mild pleuritic chest tightness (improving), nasal congestion (clear). Recent covid-19 and influenza testing 9/9/21 negative. CXR ordered on 9/9/21 and sputum culture, he was unable to complete these d/t transportation issues, he plans to complete these on 9/20/21. Has been taking levaquin, prednisone, mucinex, flonase, singulair, bronchodilators as prescribed. He subjectively reports feeling 50% improved.     The following portions of the patient's history were reviewed and updated as appropriate: allergies, current medications, past family history, past medical history, past social history, past surgical history and problem list.    Review of Systems    Constitutional: Positive for fatigue. Negative for appetite change, chills, diaphoresis, fever, unexpected weight gain and unexpected weight loss.   HENT: Positive for congestion. Negative for dental problem, drooling, ear discharge, ear pain, facial swelling, hearing loss, mouth sores, nosebleeds, postnasal drip, rhinorrhea, sinus pressure, sneezing, sore throat, swollen glands, tinnitus, trouble swallowing and voice change.    Eyes: Negative for blurred vision, double vision, pain and visual disturbance.   Respiratory: Positive for cough, chest tightness (pleuritic, improving), shortness of breath and wheezing. Negative for apnea, choking and stridor.    Cardiovascular: Negative for chest pain, palpitations and leg swelling.   Gastrointestinal: Negative for abdominal distention, abdominal pain, constipation, diarrhea, nausea, vomiting, GERD and indigestion.   Endocrine: Negative.    Genitourinary: Negative for dysuria.   Musculoskeletal: Negative for myalgias and neck pain.   Skin: Negative.  Negative for rash.   Neurological: Negative for dizziness, weakness, headache and confusion.   Psychiatric/Behavioral: Negative.        Objective   Physical Exam   Constitutional: He is oriented to person, place, and time. No distress.   Cardiovascular: Normal pulse (patient directed exam).      Pulmonary/Chest: Effort normal. No stridor.  No respiratory distress.Use of oxygen by  nasal cannula noted. He no audible wheeze...He exhibits no tenderness.   Abdominal: Soft. He exhibits no distension. There is no abdominal tenderness.   Musculoskeletal:         General: No edema.   Lymphadenopathy:     He has no cervical adenopathy.   Neurological: He is alert and oriented to person, place, and time.   Skin: Skin is warm and dry. No rash noted. He is not diaphoretic. No erythema. No pallor.   Psychiatric: He has a normal mood and affect.        PE limited d/t telehealth encounter.      Assessment/Plan      Diagnoses and all  orders for this visit:    1. COPD with acute exacerbation (CMS/HCC) (Primary)    2. COPD, severe (CMS/HCC)    3. Chronic respiratory failure with hypercapnia (CMS/HCC)    4. Requires supplemental oxygen    5. Tobacco dependence syndrome      Acute COPD exacerbation - symptoms gradually improving. Advised pt to completed chest xr and sputum culture as previously ordered. Advised to maintain adequate hydration and increase fluid intake. Continue supportive measures. Continue prednisone taper, levaquin, bronchodilators as prescribed. begin monitoring home oxygen saturations with pulse ox, to notify office immediately to present for recheck or present to ER for readings <88%. Continue utilizing trilogy home ventilator. Continue utilizing oscillating chest vest therapy 20 mins in AM and 20 mins in PM.  Discussed concerning s/s to immediately present to care or go to ER for evaluation and tx including but not limited to worsening symptoms, dyspnea, chest tightness, chest pain, AMS/confusion, lethargy, abd pain, poor oral intake. Pt verbalized understanding. Obtain sputum culture as above. Continue 3L continuous supplemental oxygen. Advised to increase fluid intake & maintain good hand hygiene. Advised strict tobacco cessation.      Patient educated to follow up within 1 week or sooner than next scheduled appointment if symptoms worsen or do not improve. Patient stated understanding and has agreed with plan of care. After visit summary was printed and given to patient.           This document has been electronically signed by Anjali Reyes PA-C on September 17, 2021 12:41 CDT,.

## 2021-09-23 ENCOUNTER — TELEMEDICINE (OUTPATIENT)
Dept: FAMILY MEDICINE CLINIC | Facility: CLINIC | Age: 66
End: 2021-09-23

## 2021-09-23 VITALS — HEIGHT: 69 IN | WEIGHT: 132 LBS | BODY MASS INDEX: 19.55 KG/M2

## 2021-09-23 DIAGNOSIS — Z99.81 REQUIRES SUPPLEMENTAL OXYGEN: ICD-10-CM

## 2021-09-23 DIAGNOSIS — D50.9 IRON DEFICIENCY ANEMIA, UNSPECIFIED IRON DEFICIENCY ANEMIA TYPE: ICD-10-CM

## 2021-09-23 DIAGNOSIS — J44.9 COPD, SEVERE (HCC): ICD-10-CM

## 2021-09-23 DIAGNOSIS — J96.12 CHRONIC RESPIRATORY FAILURE WITH HYPERCAPNIA (HCC): ICD-10-CM

## 2021-09-23 DIAGNOSIS — J44.1 COPD WITH ACUTE EXACERBATION (HCC): Primary | ICD-10-CM

## 2021-09-23 DIAGNOSIS — F17.200 TOBACCO DEPENDENCE SYNDROME: ICD-10-CM

## 2021-09-23 PROCEDURE — 99442 PR PHYS/QHP TELEPHONE EVALUATION 11-20 MIN: CPT | Performed by: PHYSICIAN ASSISTANT

## 2021-09-23 RX ORDER — SACCHAROMYCES BOULARDII 250 MG
250 CAPSULE ORAL 2 TIMES DAILY
Qty: 60 CAPSULE | Refills: 0 | Status: SHIPPED | OUTPATIENT
Start: 2021-09-23 | End: 2021-10-23

## 2021-09-23 RX ORDER — ALBUTEROL SULFATE 1.25 MG/3ML
1 SOLUTION RESPIRATORY (INHALATION) EVERY 6 HOURS PRN
Qty: 3 ML | Refills: 12 | Status: SHIPPED | OUTPATIENT
Start: 2021-09-23

## 2021-09-23 RX ORDER — CEFUROXIME AXETIL 500 MG/1
500 TABLET ORAL 2 TIMES DAILY
Qty: 20 TABLET | Refills: 0 | Status: SHIPPED | OUTPATIENT
Start: 2021-09-23

## 2021-09-23 RX ORDER — AZITHROMYCIN 250 MG/1
TABLET, FILM COATED ORAL
Qty: 6 TABLET | Refills: 0 | Status: SHIPPED | OUTPATIENT
Start: 2021-09-23

## 2021-09-23 RX ORDER — LANOLIN ALCOHOL/MO/W.PET/CERES
325 CREAM (GRAM) TOPICAL 2 TIMES DAILY
Qty: 60 TABLET | Refills: 5 | Status: SHIPPED | OUTPATIENT
Start: 2021-09-23

## 2021-09-23 NOTE — PROGRESS NOTES
Subjective   Kermit Corley is a 66 y.o. male.   You have chosen to receive care through a telephone visit. Do you consent to use a telephone visit for your medical care today? Yes This visit has been rescheduled as a phone visit to comply with patient safety concerns in accordance with CDC recommendations. Total time of discussion was 15 minutes.     COPD  He complains of cough, shortness of breath and wheezing. Pertinent negatives include no appetite change, chest pain, ear pain, fever, myalgias, postnasal drip, rhinorrhea, sneezing, sore throat, trouble swallowing or weight loss.        Pt presents today via telehealth encounter for a f/u on copd exacerbation. He admits to feeling unwell x 3 weeks. He admits to increased dyspnea from baseline, productive cough of white sputums. He reports utilizing 3L continuous supplemental oxygen. Pt with hx of end-stage emphysema and copd, chronic severe hypoxemia requiring continuous home oxygen therapy. Utilizing trilogy home ventilator. Utilizing oscillating chest vest therapy 20 mins in AM and 20 mins in PM. Pt utilizing trelegy inhaler, utilizing albuterol inhaler prn - using q 4 hours while awake. He was recently started on tobramycin inhalation due to chronic pseudomonas colonization. He denies fever, chills, myalgias, nausea, vomiting, loss of taste or smell, hemoptysis, diarrhea. He admits to wheezing, pleuritic chest tightness, nasal congestion (clear), cough, dyspnea increased from baseline. Recent covid-19 and influenza testing 9/9/21 negative. CXR ordered on 9/9/21 and sputum culture, he was unable to complete these d/t transportation issues, he plans to complete these today. He was previously treated with levaquin and prednisone taper which he reports completing 2 days ago with mild improvement in symptoms. He reports utilizing mucinex, flonase, singulair, bronchodilators as prescribed.     The following portions of the patient's history were reviewed  and updated as appropriate: allergies, current medications, past family history, past medical history, past social history, past surgical history and problem list.    Review of Systems   Constitutional: Positive for fatigue. Negative for appetite change, chills, diaphoresis, fever, unexpected weight gain and unexpected weight loss.   HENT: Positive for congestion. Negative for dental problem, drooling, ear discharge, ear pain, facial swelling, hearing loss, mouth sores, nosebleeds, postnasal drip, rhinorrhea, sinus pressure, sneezing, sore throat, swollen glands, tinnitus, trouble swallowing and voice change.    Eyes: Negative for blurred vision, double vision, pain and visual disturbance.   Respiratory: Positive for cough, chest tightness (pleuritic), shortness of breath and wheezing. Negative for apnea, choking and stridor.    Cardiovascular: Negative for chest pain, palpitations and leg swelling.   Gastrointestinal: Negative for abdominal distention, abdominal pain, constipation, diarrhea, nausea, vomiting, GERD and indigestion.   Endocrine: Negative.    Genitourinary: Negative for dysuria.   Musculoskeletal: Negative for myalgias and neck pain.   Skin: Negative.  Negative for rash.   Neurological: Positive for headache. Negative for dizziness, weakness and confusion.   Psychiatric/Behavioral: Negative.        Objective   Physical Exam   Constitutional: He is oriented to person, place, and time. No distress.   Cardiovascular: Normal pulse (patient directed exam).      Pulmonary/Chest: Effort normal. No stridor.  No respiratory distress.Use of oxygen by  nasal cannula noted. He no audible wheeze...He exhibits no tenderness.   Abdominal: Soft. He exhibits no distension. There is no abdominal tenderness.   Musculoskeletal:         General: No edema.   Lymphadenopathy:     He has no cervical adenopathy.   Neurological: He is alert and oriented to person, place, and time.   Skin: Skin is warm and dry. No rash noted.  He is not diaphoretic. No erythema. No pallor.   Psychiatric: He has a normal mood and affect.        PE limited d/t telehealth encounter.      Assessment/Plan   Diagnoses and all orders for this visit:    1. COPD with acute exacerbation (CMS/HCC) (Primary)  -     CBC w AUTO Differential; Future  -     Comprehensive metabolic panel; Future  -     albuterol (ACCUNEB) 1.25 MG/3ML nebulizer solution; Take 3 mL by nebulization Every 6 (Six) Hours As Needed for Wheezing.  Dispense: 3 mL; Refill: 12  -     cefuroxime (CEFTIN) 500 MG tablet; Take 1 tablet by mouth 2 (Two) Times a Day.  Dispense: 20 tablet; Refill: 0  -     azithromycin (Zithromax Z-Ravinder) 250 MG tablet; Take 2 tablets by mouth on day 1, then 1 tablet daily on days 2-5  Dispense: 6 tablet; Refill: 0    2. Iron deficiency anemia, unspecified iron deficiency anemia type  -     ferrous sulfate 325 (65 FE) MG EC tablet; Take 1 tablet by mouth 2 (two) times a day.  Dispense: 60 tablet; Refill: 5    3. COPD, severe (CMS/HCC)  -     albuterol (ACCUNEB) 1.25 MG/3ML nebulizer solution; Take 3 mL by nebulization Every 6 (Six) Hours As Needed for Wheezing.  Dispense: 3 mL; Refill: 12    4. Chronic respiratory failure with hypercapnia (CMS/HCC)    5. Requires supplemental oxygen    6. Tobacco dependence syndrome    Other orders  -     saccharomyces boulardii (Florastor) 250 MG capsule; Take 1 capsule by mouth 2 (Two) Times a Day for 30 days.  Dispense: 60 capsule; Refill: 0      Acute COPD exacerbation/severe copd/chronic respiratory failure - Advised pt to complete chest xr and sputum culture as previously ordered. Complete cbc and cmp as above. Advised to maintain adequate hydration and increase fluid intake. Continue supportive measures. Begin ceftin and zpak as above, take with probiotics. He has completed prednisone taper and levaquin with minimal improvement. Continue bronchodilators as prescribed. begin monitoring home oxygen saturations with pulse ox, to notify  office immediately to present for recheck or present to ER for readings <88%. Continue utilizing trilogy home ventilator. Continue utilizing oscillating chest vest therapy 20 mins in AM and 20 mins in PM.  Discussed concerning s/s to immediately present to care or go to ER for evaluation and tx including but not limited to worsening symptoms, dyspnea, chest tightness, chest pain, AMS/confusion, lethargy, abd pain, poor oral intake. Pt verbalized understanding. Continue 3L continuous supplemental oxygen. Advised to increase fluid intake & maintain good hand hygiene. Advised strict tobacco cessation.     Patient educated to follow up in 3 days or sooner than next scheduled appointment if symptoms worsen or do not improve. Patient stated understanding and has agreed with plan of care. After visit summary was printed and given to patient.      This document has been electronically signed by Anjali Reyes PA-C on September 23, 2021 11:34 CDT,.

## 2021-09-24 ENCOUNTER — TELEPHONE (OUTPATIENT)
Dept: FAMILY MEDICINE CLINIC | Facility: CLINIC | Age: 66
End: 2021-09-24

## 2021-09-27 DIAGNOSIS — J44.1 COPD WITH ACUTE EXACERBATION (HCC): ICD-10-CM

## 2021-09-27 DIAGNOSIS — R05.9 COUGH: ICD-10-CM

## 2025-03-20 NOTE — PLAN OF CARE
Patient Name: Aristides James     : 1982    Diagnosis: BACK PAIN        TO BE COMPLETED BY ATTENDING PHYSICIAN    I SPOKE WITH THIS PATIENT ON 3/21/25    HE IS COMPLETELY OFF WORK FOR ONE MONTH. HE WILL BE RE-EVALUATED AT THAT TIME. ANY QUESTIONS YOU CAN CALL OUR OFFICE.            __________________________  SHARONDA GILBERT MD      3/21/2025  [address] [place] [id number]  PHONE: [phone]  FAX: [phone]         Problem: Patient Care Overview (Adult)  Goal: Plan of Care Review  Outcome: Ongoing (interventions implemented as appropriate)   01/22/18 1711   Coping/Psychosocial Response Interventions   Plan Of Care Reviewed With patient   Patient Care Overview   Progress improving     Goal: Adult Individualization and Mutuality  Outcome: Ongoing (interventions implemented as appropriate)    Goal: Discharge Needs Assessment  Outcome: Ongoing (interventions implemented as appropriate)      Problem: Respiratory Insufficiency (Adult)  Goal: Acid/Base Balance  Outcome: Ongoing (interventions implemented as appropriate)    Goal: Effective Ventilation  Outcome: Ongoing (interventions implemented as appropriate)